# Patient Record
Sex: FEMALE | Race: WHITE | NOT HISPANIC OR LATINO | Employment: OTHER | URBAN - METROPOLITAN AREA
[De-identification: names, ages, dates, MRNs, and addresses within clinical notes are randomized per-mention and may not be internally consistent; named-entity substitution may affect disease eponyms.]

---

## 2018-12-18 ENCOUNTER — OFFICE VISIT (OUTPATIENT)
Dept: INTERNAL MEDICINE | Facility: CLINIC | Age: 58
End: 2018-12-18
Payer: MEDICARE

## 2018-12-18 VITALS
OXYGEN SATURATION: 76 % | WEIGHT: 251.13 LBS | SYSTOLIC BLOOD PRESSURE: 102 MMHG | DIASTOLIC BLOOD PRESSURE: 64 MMHG | TEMPERATURE: 98 F | BODY MASS INDEX: 41.84 KG/M2 | HEART RATE: 123 BPM | HEIGHT: 65 IN

## 2018-12-18 DIAGNOSIS — R73.9 HYPERGLYCEMIA: ICD-10-CM

## 2018-12-18 DIAGNOSIS — Z76.89 ENCOUNTER TO ESTABLISH CARE WITH NEW DOCTOR: Primary | ICD-10-CM

## 2018-12-18 DIAGNOSIS — Z86.69 HX OF MIGRAINE HEADACHES: ICD-10-CM

## 2018-12-18 DIAGNOSIS — L30.9 ECZEMA, UNSPECIFIED TYPE: ICD-10-CM

## 2018-12-18 DIAGNOSIS — R09.02 HYPOXEMIA: ICD-10-CM

## 2018-12-18 DIAGNOSIS — R06.02 SHORTNESS OF BREATH: ICD-10-CM

## 2018-12-18 DIAGNOSIS — I10 HYPERTENSION, WELL CONTROLLED: ICD-10-CM

## 2018-12-18 DIAGNOSIS — E78.5 HYPERLIPIDEMIA, UNSPECIFIED HYPERLIPIDEMIA TYPE: ICD-10-CM

## 2018-12-18 PROCEDURE — 90686 IIV4 VACC NO PRSV 0.5 ML IM: CPT | Mod: PBBFAC,PO

## 2018-12-18 PROCEDURE — 99205 OFFICE O/P NEW HI 60 MIN: CPT | Mod: PBBFAC,PO | Performed by: FAMILY MEDICINE

## 2018-12-18 PROCEDURE — 99999 PR PBB SHADOW E&M-NEW PATIENT-LVL V: CPT | Mod: PBBFAC,,, | Performed by: FAMILY MEDICINE

## 2018-12-18 PROCEDURE — 99205 OFFICE O/P NEW HI 60 MIN: CPT | Mod: S$PBB,,, | Performed by: FAMILY MEDICINE

## 2018-12-18 RX ORDER — TOPIRAMATE 100 MG/1
100 TABLET, FILM COATED ORAL DAILY
Qty: 30 TABLET | Refills: 5 | Status: SHIPPED | OUTPATIENT
Start: 2018-12-18 | End: 2019-08-13 | Stop reason: SDUPTHER

## 2018-12-18 RX ORDER — NARATRIPTAN 2.5 MG/1
TABLET ORAL
COMMUNITY
Start: 2018-11-21 | End: 2018-12-18 | Stop reason: SDUPTHER

## 2018-12-18 RX ORDER — SUMATRIPTAN 20 MG/1
1 SPRAY NASAL ONCE
Qty: 10 EACH | Refills: 5 | Status: SHIPPED | OUTPATIENT
Start: 2018-12-18 | End: 2019-12-17 | Stop reason: SDUPTHER

## 2018-12-18 RX ORDER — PROMETHAZINE HYDROCHLORIDE 25 MG/1
25 TABLET ORAL EVERY 4 HOURS PRN
Qty: 45 TABLET | Refills: 5 | Status: SHIPPED | OUTPATIENT
Start: 2018-12-18 | End: 2019-08-08 | Stop reason: SDUPTHER

## 2018-12-18 RX ORDER — SUMATRIPTAN SUCCINATE 100 MG/1
100 TABLET ORAL ONCE
Qty: 9 TABLET | Refills: 5 | Status: SHIPPED | OUTPATIENT
Start: 2018-12-18 | End: 2019-08-08 | Stop reason: SDUPTHER

## 2018-12-18 RX ORDER — TRIAZOLAM 0.25 MG/1
TABLET ORAL
COMMUNITY
Start: 2018-12-03 | End: 2018-12-18

## 2018-12-18 RX ORDER — SUMATRIPTAN 20 MG/1
SPRAY NASAL
COMMUNITY
Start: 2018-11-15 | End: 2018-12-18 | Stop reason: SDUPTHER

## 2018-12-18 RX ORDER — FLUTICASONE PROPIONATE 50 MCG
2 SPRAY, SUSPENSION (ML) NASAL DAILY
Qty: 1 BOTTLE | Refills: 5 | Status: SHIPPED | OUTPATIENT
Start: 2018-12-18 | End: 2021-03-30 | Stop reason: SDUPTHER

## 2018-12-18 RX ORDER — PROMETHAZINE HYDROCHLORIDE 25 MG/1
TABLET ORAL
COMMUNITY
Start: 2018-12-08 | End: 2018-12-18 | Stop reason: SDUPTHER

## 2018-12-18 RX ORDER — TIZANIDINE 4 MG/1
4 TABLET ORAL DAILY
Qty: 30 TABLET | Refills: 5 | Status: SHIPPED | OUTPATIENT
Start: 2018-12-18 | End: 2019-08-08 | Stop reason: SDUPTHER

## 2018-12-18 RX ORDER — TOPIRAMATE 100 MG/1
TABLET, FILM COATED ORAL
COMMUNITY
Start: 2018-11-11 | End: 2018-12-18 | Stop reason: SDUPTHER

## 2018-12-18 RX ORDER — TRIAMCINOLONE ACETONIDE 1 MG/G
CREAM TOPICAL
COMMUNITY
Start: 2018-11-13 | End: 2018-12-18 | Stop reason: SDUPTHER

## 2018-12-18 RX ORDER — FLUTICASONE PROPIONATE 50 MCG
SPRAY, SUSPENSION (ML) NASAL
COMMUNITY
Start: 2018-11-11 | End: 2018-12-18 | Stop reason: SDUPTHER

## 2018-12-18 RX ORDER — NARATRIPTAN 2.5 MG/1
TABLET ORAL
Qty: 9 TABLET | Refills: 5 | Status: SHIPPED | OUTPATIENT
Start: 2018-12-18 | End: 2019-10-15 | Stop reason: SDUPTHER

## 2018-12-18 RX ORDER — TRIAMCINOLONE ACETONIDE 1 MG/G
CREAM TOPICAL 2 TIMES DAILY
Qty: 45 G | Refills: 5 | Status: SHIPPED | OUTPATIENT
Start: 2018-12-18 | End: 2021-03-30 | Stop reason: SDUPTHER

## 2018-12-18 RX ORDER — CLONIDINE HYDROCHLORIDE 0.1 MG/1
0.1 TABLET ORAL ONCE
Qty: 30 TABLET | Refills: 5 | Status: SHIPPED | OUTPATIENT
Start: 2018-12-18 | End: 2019-08-08 | Stop reason: SDUPTHER

## 2018-12-18 RX ORDER — VERAPAMIL HYDROCHLORIDE 180 MG/1
TABLET, FILM COATED, EXTENDED RELEASE ORAL
COMMUNITY
Start: 2018-11-11 | End: 2018-12-18 | Stop reason: SDUPTHER

## 2018-12-18 RX ORDER — CLONIDINE HYDROCHLORIDE 0.1 MG/1
TABLET ORAL
COMMUNITY
Start: 2018-11-11 | End: 2018-12-18 | Stop reason: SDUPTHER

## 2018-12-18 RX ORDER — SUMATRIPTAN SUCCINATE 100 MG/1
TABLET ORAL
COMMUNITY
Start: 2018-11-13 | End: 2018-12-18 | Stop reason: SDUPTHER

## 2018-12-18 RX ORDER — VERAPAMIL HYDROCHLORIDE 180 MG/1
180 TABLET, FILM COATED, EXTENDED RELEASE ORAL DAILY
Qty: 30 TABLET | Refills: 5 | Status: SHIPPED | OUTPATIENT
Start: 2018-12-18 | End: 2019-06-07 | Stop reason: SDUPTHER

## 2018-12-18 RX ORDER — TIZANIDINE 4 MG/1
TABLET ORAL
COMMUNITY
Start: 2018-10-30 | End: 2018-12-18 | Stop reason: SDUPTHER

## 2018-12-18 NOTE — PROGRESS NOTES
Subjective:       Patient ID: Serene Rod is a 58 y.o. female.    Chief Complaint: Establish Care    New patient establish care.  Medical history includes daily migraine headaches, pulmonary ice sensitivity with hypoxemia.  She had a lung biopsy and was placed on prednisone for 6 months.  She currently uses on nasal O2 with a concentrator with walking and at bedtime.  Her O2 sats have been low as 70% she has had Prevnar 13 immunization August of this year she is not up-to-date on gyn exam mammogram.  Lab done earlier this year with elevated lipids and elevated glucose.  She has a contact dermatitis related to a new bed which is being replaced.  She also has eczema using triamcinolone cream.      Review of Systems   Constitutional: Negative for activity change, appetite change, fatigue and unexpected weight change.   HENT: Positive for congestion and sneezing. Negative for dental problem, ear pain, hearing loss, sore throat, tinnitus and trouble swallowing.         Improved with Flonase   Eyes: Negative for pain, redness, itching and visual disturbance.   Respiratory: Positive for cough, shortness of breath and wheezing. Negative for chest tightness.    Cardiovascular: Negative for chest pain, palpitations and leg swelling.   Gastrointestinal: Negative for abdominal distention, abdominal pain, blood in stool, constipation, diarrhea, nausea and vomiting.   Genitourinary: Negative for difficulty urinating, dysuria, frequency, hematuria and urgency.   Musculoskeletal: Negative for arthralgias, back pain, joint swelling, neck pain and neck stiffness.   Skin: Positive for rash. Negative for wound.        Eczema  well as contact dermatitis   Neurological: Positive for headaches. Negative for dizziness, tremors, syncope, weakness, light-headedness and numbness.   Hematological: Negative for adenopathy. Does not bruise/bleed easily.   Psychiatric/Behavioral: Negative for agitation, dysphoric mood and sleep disturbance. The  patient is not nervous/anxious.        Objective:      Physical Exam   Constitutional: She is oriented to person, place, and time. She appears well-developed and well-nourished.   HENT:   Right Ear: External ear normal.   Left Ear: External ear normal.   Nose: Nose normal.   Mouth/Throat: Oropharynx is clear and moist.   Eyes: Conjunctivae and EOM are normal. Pupils are equal, round, and reactive to light.   Neck: Normal range of motion. Neck supple. No thyromegaly present.   Cardiovascular: Normal rate, regular rhythm and normal heart sounds.   No murmur heard.  Pulmonary/Chest: She has no wheezes. She has rales.   She has mild dry basilar rales right side greater than left side.  With walking O2 sat decreased from 93% to 74%.  She was short of breath at this time as expected   Abdominal: Soft. Bowel sounds are normal. She exhibits no distension and no mass. There is no tenderness.   Musculoskeletal: She exhibits no edema or tenderness.   Lymphadenopathy:     She has no cervical adenopathy.   Neurological: She is alert and oriented to person, place, and time. She has normal reflexes. She displays normal reflexes. No cranial nerve deficit. She exhibits normal muscle tone. Coordination normal.   Skin: Skin is warm and dry. No rash noted. She is not diaphoretic.   Psychiatric: She has a normal mood and affect. Her behavior is normal. Judgment and thought content normal.       No results found for any previous visit.     Assessment:       1. Encounter to establish care with new doctor    2. Hx of migraine headaches    3. Shortness of breath    4. Hypertension, well controlled    5. Eczema, unspecified type    6. Hypoxemia    7. Hyperlipidemia, unspecified hyperlipidemia type    8. Hyperglycemia        Plan:   All of her medications were refilled.  Referral for pulmonary Neurology in Gynecology.  Lab was ordered to be done fasting.  Need records from previous physicians.  She recently moved from [place].  Follow-up in  3 months.  She needs a new oxygen concentrator and this was ordered      Encounter to establish care with new doctor  -     Ambulatory referral to Gynecology    Hx of migraine headaches  -     Ambulatory referral to Neurology    Shortness of breath    Hypertension, well controlled  -     Comprehensive metabolic panel; Future; Expected date: 12/18/2018  -     Urinalysis; Future; Expected date: 12/18/2018  -     CBC auto differential; Future; Expected date: 12/18/2018  -     TSH; Future; Expected date: 12/18/2018    Eczema, unspecified type    Hypoxemia  -     OXYGEN FOR HOME USE  -     Ambulatory referral to Pulmonology    Hyperlipidemia, unspecified hyperlipidemia type  -     Lipid panel; Future; Expected date: 12/18/2018    Hyperglycemia  -     Hemoglobin A1c    Other orders  -     Influenza - Quadrivalent (3 years & older) (PF)  -     verapamil (CALAN-SR) 180 MG CR tablet; Take 1 tablet (180 mg total) by mouth once daily.  Dispense: 30 tablet; Refill: 5  -     triamcinolone acetonide 0.1% (KENALOG) 0.1 % cream; Apply topically 2 (two) times daily.  Dispense: 45 g; Refill: 5  -     topiramate (TOPAMAX) 100 MG tablet; Take 1 tablet (100 mg total) by mouth once daily.  Dispense: 30 tablet; Refill: 5  -     tiZANidine (ZANAFLEX) 4 MG tablet; Take 1 tablet (4 mg total) by mouth once daily.  Dispense: 30 tablet; Refill: 5  -     SUMAtriptan (IMITREX) 20 mg/actuation nasal spray; 1 spray (20 mg total) by Nasal route once. for 1 dose  Dispense: 10 each; Refill: 5  -     sumatriptan (IMITREX) 100 MG tablet; Take 1 tablet (100 mg total) by mouth once. for 1 dose  Dispense: 9 tablet; Refill: 5  -     promethazine (PHENERGAN) 25 MG tablet; Take 1 tablet (25 mg total) by mouth every 4 (four) hours as needed for Nausea.  Dispense: 45 tablet; Refill: 5  -     naratriptan (AMERGE) 2.5 MG tablet; Qd prn  Dispense: 9 tablet; Refill: 5  -     fluticasone (FLONASE) 50 mcg/actuation nasal spray; 2 sprays (100 mcg total) by Each Nare  route once daily.  Dispense: 1 Bottle; Refill: 5  -     cloNIDine (CATAPRES) 0.1 MG tablet; Take 1 tablet (0.1 mg total) by mouth once. for 1 dose  Dispense: 30 tablet; Refill: 5

## 2018-12-19 ENCOUNTER — TELEPHONE (OUTPATIENT)
Dept: INTERNAL MEDICINE | Facility: CLINIC | Age: 58
End: 2018-12-19

## 2018-12-19 NOTE — TELEPHONE ENCOUNTER
----- Message from Autumn Morales sent at 12/19/2018  3:38 PM CST -----  Contact: Patient  Patient called to speak with the nurse; she had a question regarding her visit. She can be contacted at [phone].    Thanks,  Autumn

## 2018-12-20 ENCOUNTER — TELEPHONE (OUTPATIENT)
Dept: PULMONOLOGY | Facility: CLINIC | Age: 58
End: 2018-12-20

## 2018-12-22 ENCOUNTER — LAB VISIT (OUTPATIENT)
Dept: LAB | Facility: HOSPITAL | Age: 58
End: 2018-12-22
Attending: INTERNAL MEDICINE
Payer: MEDICARE

## 2018-12-22 ENCOUNTER — OFFICE VISIT (OUTPATIENT)
Dept: PULMONOLOGY | Facility: CLINIC | Age: 58
End: 2018-12-22
Payer: MEDICARE

## 2018-12-22 VITALS
OXYGEN SATURATION: 93 % | HEART RATE: 76 BPM | HEIGHT: 65 IN | WEIGHT: 252.88 LBS | SYSTOLIC BLOOD PRESSURE: 130 MMHG | BODY MASS INDEX: 42.13 KG/M2 | DIASTOLIC BLOOD PRESSURE: 82 MMHG

## 2018-12-22 DIAGNOSIS — E66.01 CLASS 3 SEVERE OBESITY DUE TO EXCESS CALORIES WITHOUT SERIOUS COMORBIDITY WITH BODY MASS INDEX (BMI) OF 40.0 TO 44.9 IN ADULT: Chronic | ICD-10-CM

## 2018-12-22 DIAGNOSIS — J67.9 HYPERSENSITIVITY PNEUMONITIS: Primary | ICD-10-CM

## 2018-12-22 DIAGNOSIS — J67.9 HYPERSENSITIVITY PNEUMONITIS: ICD-10-CM

## 2018-12-22 DIAGNOSIS — J96.11 CHRONIC RESPIRATORY FAILURE WITH HYPOXIA: ICD-10-CM

## 2018-12-22 DIAGNOSIS — R06.02 SHORTNESS OF BREATH: ICD-10-CM

## 2018-12-22 DIAGNOSIS — R09.02 HYPOXEMIA: ICD-10-CM

## 2018-12-22 LAB
ALBUMIN SERPL BCP-MCNC: 3.6 G/DL
ALP SERPL-CCNC: 152 U/L
ALT SERPL W/O P-5'-P-CCNC: 22 U/L
ANION GAP SERPL CALC-SCNC: 10 MMOL/L
AST SERPL-CCNC: 29 U/L
BILIRUB SERPL-MCNC: 0.4 MG/DL
BUN SERPL-MCNC: 18 MG/DL
CALCIUM SERPL-MCNC: 9.8 MG/DL
CHLORIDE SERPL-SCNC: 102 MMOL/L
CO2 SERPL-SCNC: 25 MMOL/L
CREAT SERPL-MCNC: 1.2 MG/DL
CRP SERPL-MCNC: 5.9 MG/L
ERYTHROCYTE [SEDIMENTATION RATE] IN BLOOD BY WESTERGREN METHOD: 13 MM/HR
EST. GFR  (AFRICAN AMERICAN): 57.6 ML/MIN/1.73 M^2
EST. GFR  (NON AFRICAN AMERICAN): 49.9 ML/MIN/1.73 M^2
GLUCOSE SERPL-MCNC: 103 MG/DL
POTASSIUM SERPL-SCNC: 4.2 MMOL/L
PROT SERPL-MCNC: 7.7 G/DL
SODIUM SERPL-SCNC: 137 MMOL/L

## 2018-12-22 PROCEDURE — 99205 OFFICE O/P NEW HI 60 MIN: CPT | Mod: S$PBB,,, | Performed by: INTERNAL MEDICINE

## 2018-12-22 PROCEDURE — 99999 PR PBB SHADOW E&M-EST. PATIENT-LVL III: CPT | Mod: PBBFAC,,, | Performed by: INTERNAL MEDICINE

## 2018-12-22 PROCEDURE — 36415 COLL VENOUS BLD VENIPUNCTURE: CPT | Mod: PO

## 2018-12-22 PROCEDURE — 86255 FLUORESCENT ANTIBODY SCREEN: CPT | Mod: 91

## 2018-12-22 PROCEDURE — 99213 OFFICE O/P EST LOW 20 MIN: CPT | Mod: PBBFAC,PO | Performed by: INTERNAL MEDICINE

## 2018-12-22 PROCEDURE — 80053 COMPREHEN METABOLIC PANEL: CPT

## 2018-12-22 PROCEDURE — 86140 C-REACTIVE PROTEIN: CPT

## 2018-12-22 PROCEDURE — 85651 RBC SED RATE NONAUTOMATED: CPT | Mod: PO

## 2018-12-22 PROCEDURE — 86038 ANTINUCLEAR ANTIBODIES: CPT

## 2018-12-22 NOTE — PATIENT INSTRUCTIONS
Chronic Lung Disease: If Oxygen Is Prescribed   Supplemental oxygen is prescribed if tests show that the level of oxygen in your blood is too low. If the level stays too low for too long, serious problems can develop in many parts of the body. Supplemental oxygen helps to relieve your symptoms and prevent future problems by getting more oxygen to the blood. Depending on your test results, you may need oxygen all the time. Or it may only be needed during certain activities, such as exercise or sleep. When oxygen is prescribed, youll be referred to a medical equipment company. They will set up the oxygen unit and teach you how to use it.  Nasal cannula     A nasal cannula should be placed in the nose with the prongs arching toward you.     Oxygen is most often inhaled through a nasal cannula (lightweight tube with two hollow prongs that fit just inside the nose).  Types of supplemental oxygen    Compressed oxygen   Oxygen concentrator   Liquid oxygen   Prescribed oxygen comes in several forms. You may use more than one type, depending on when you need oxygen:  · Compressed oxygen is oxygen gas stored in a tank. Because the oxygen is stored under pressure, these tanks must be handled carefully. Gauges on the tank can be used to adjust the oxygen flow rate. Your healthcare provider will determine what this should be. Small tanks can be carried. Larger tanks are on wheels and can be pulled around the house.  · An oxygen concentrator is a machine about the size of a large suitcase. It plugs into an electrical outlet. (A back-up oxygen supply is recommended in case of a power outage.) The machine takes oxygen from the air and concentrates it. Its then delivered to you through plastic tubing. The tubing is long enough so that you can move around the house. When youre using the concentrator, it must be kept somewhere that has a good supply of fresh air. (Dont keep it in a confined space, like a closet.) You may be set  up on a concentrator if you need oxygen all the time or while youre sleeping.  · Liquid oxygen results when oxygen gas is cooled to a very low temperature. Its kept in special containers that maintain this low temperature. When you use liquid oxygen, its warmed and becomes gas before reaching the cannula. Most tanks come with a portable unit that you can carry or pull on a cart. Some of these weigh only a few pounds. Liquid oxygen units are easy to carry around. If you need oxygen all the time or during activity, this kind of unit can help you stay active.  Oxygen is prescribed just for you  Your healthcare provider will prescribe oxygen based on your needs. Here are a few things you should know:  · A therapist from the medical equipment company will explain when to use oxygen and what type to use. Youll be taught how to use and maintain your oxygen equipment.  · You must use the exact rate of oxygen prescribed for each activity. Dont increase or decrease the amount without asking your healthcare provider first.  · Supplemental oxygen is a medicine. Its not addictive and causes no side effects when used as directed.   Date Last Reviewed: 5/1/2016  © 3979-2973 [employer]. [address] [place] [address], [place] [address]. All rights reserved. This information is not intended as a substitute for professional medical care. Always follow your healthcare professional's instructions.

## 2018-12-22 NOTE — ASSESSMENT & PLAN NOTE
Exercise oximetry:  1) Patient's O2 Sat on room air while at rest: 72%  If at rest Sat is 89% or above, continue.     2) Patient's O2 Sat on room air while exercisin% on 4L /min  If exercise Sat is 88% or below, continue.     Start oxygen supplementation at 4 L /min continous.   Overnight oximetry on room air.

## 2018-12-22 NOTE — LETTER
December 22, 2018      Jalen Box MD  [address] [place] [address] [address] [place] [address]           [facility]  [address] [facility] [place] [address]  Phone: [phone]  Fax: [phone]          Patient: Serene Rod   MR Number: [id number]   YOB: 1960   Date of Visit: 12/22/2018       Dear Dr. Jalen Box:    Thank you for referring Serene Rod to me for evaluation. Attached you will find relevant portions of my assessment and plan of care.    If you have questions, please do not hesitate to call me. I look forward to following Serene Rod along with you.    Sincerely,    Len Pradhan MD    Enclosure  CC:  No Recipients    If you would like to receive this communication electronically, please contact [email or web address] or [phone] to request more information on [employer] access.    For providers and/or their staff who would like to refer a patient to [facility], please contact us through our one-stop-shop provider referral line, [facility], at [phone].    If you feel you have received this communication in error or would no longer like to receive these types of communications, please e-mail [email or web address]

## 2018-12-22 NOTE — PROGRESS NOTES
New patient    Subjective:      Patient ID: Serene Rod is a 58 y.o. female.    Patient Active Problem List   Diagnosis    Hyperglycemia    Hypoxemia    Eczema    Hypertension, well controlled    Hx of migraine headaches    Encounter to establish care with new doctor    Shortness of breath    Hyperlipidemia    Chronic Hypersensitivity Pneumonitis    Class 3 severe obesity due to excess calories without serious comorbidity with body mass index (BMI) of 40.0 to 44.9 in adult    Chronic respiratory failure with hypoxia       Problem list has been reviewed.    she has been referred by Jalen Box MD for evaluation and management for   Chief Complaint   Patient presents with    Shortness of Breath       Chief Complaint: Shortness of Breath      HPI:    Diagnosed with [facility] in 10/2017 by [facility] ( [facility] - Dr. Church). Treated with Oral prednisone for 4 months. Oxygen supplementation at 4 L /min.  Relocated to  in 10 2018. Here to establish care.     Patient reports cough and difficulty breathing and denies hemoptysis. Patient denies recent sick contacts.   Patient does not have new pets. Patient does not have a personal history of asthma but has a brother who has asthma. Patient does have a history of environmental allergens. Allergy testing revealed allergy to a variety of trees, grasses, mold, dust mites, cats etc. Patient has not traveled recently. Patient does not have a history of smoking. Patient has had a previous chest x-ray. Patient has had a PPD done.  PPD was Negative      Previous Report Reviewed: none     Past Medical History: The following portions of the patient's history were reviewed and updated as appropriate:   She  has a past surgical history that includes neurostimulator and Lung biopsy.  Her family history includes Alcohol abuse in her brother; Emphysema in her father; Heart attack in her maternal grandfather; Heart disease in her brother, father, and mother; Lung  "cancer in her mother; Stroke in her father and mother.  She  reports that  has never smoked. she has never used smokeless tobacco. She reports that she drinks alcohol. Her drug history is not on file.  She has a current medication list which includes the following prescription(s): bupropion hcl, clonidine, escitalopram oxalate, fluticasone, naratriptan, promethazine, sumatriptan, sumatriptan, tizanidine, topiramate, triamcinolone acetonide 0.1%, and verapamil.  She is allergic to cat/feline products and grass pollen-june grass standard..    Review of Systems   Constitutional: Positive for night sweats. Negative for chills and fatigue.   HENT: Positive for postnasal drip, rhinorrhea, voice change and congestion.    Eyes: Negative for itching.   Respiratory: Positive for apnea, snoring, wheezing and dyspnea on extertion. Negative for hemoptysis and somnolence.    Cardiovascular: Positive for leg swelling. Negative for chest pain and palpitations.   Genitourinary: Negative for difficulty urinating and hematuria.   Endocrine: Negative for cold intolerance and heat intolerance.    Musculoskeletal: Positive for back pain.   Skin: Negative for rash.   Gastrointestinal: Positive for nausea and acid reflux. Negative for vomiting, abdominal pain and abdominal distention.   Neurological: Positive for dizziness and headaches.   Hematological: Excessive bruising.   Psychiatric/Behavioral: The patient is nervous/anxious.    All other systems reviewed and are negative.         Occupational History:  Retired on disability. Worked in customer service.   Denies occupational exposure to asbestos, silica and petrochemicals.    Avocational Exposures:  none    Pet Exposures:  Cat: Reports allergy to cat dander.       Objective:     Vitals:    12/22/18 1052   BP: 130/82   Pulse: 76   SpO2: (!) 93%   Weight: 114.7 kg (252 lb 13.9 oz)   Height: 5' 4.5" (1.638 m)   PainSc:   6   PainLoc: Head     Body mass index is 42.73 kg/m².    Physical " Exam   Constitutional: She is oriented to person, place, and time. She appears well-developed and well-nourished.   Morbidly obese   HENT:   Head: Normocephalic and atraumatic.   Mallampati 3    Eyes: EOM are normal. Pupils are equal, round, and reactive to light.   Neck: Normal range of motion. Neck supple.   14.5   Cardiovascular: Normal rate and regular rhythm.   Pulmonary/Chest: Effort normal and breath sounds normal.   Abdominal: Soft. Bowel sounds are normal.   Musculoskeletal: Normal range of motion.   Neurological: She is alert and oriented to person, place, and time.   Skin: Skin is warm and dry. Capillary refill takes less than 2 seconds.   Psychiatric: She has a normal mood and affect.   Nursing note and vitals reviewed.      Personal Diagnostic Review    Home Oxygen Evaluation     1) Patient's O2 Sat on room air while at rest: 72%  If at rest Sat is 89% or above, continue.     2) Patient's O2 Sat on room air while exercisin% on 4L /min  If exercise Sat is 88% or below, continue.       Assessment /Plan:     Discussed diagnosis, its evaluation, treatment and usual course. All questions answered.    Problem List Items Addressed This Visit        Pulmonary    Hypoxemia    Chronic Hypersensitivity Pneumonitis - Primary    Current Assessment & Plan     EVALUATION:  [x]        Complete PFT with bronchodilator.  []        Bronchial challenge with methacholine.   [x]        Stress test, pulmonary.  [x]        PULM - Arterial Blood Gases  []        Chest X Ray  [x]        CT scan of chest.   [x]        KIMBERLEY  [x]        Sedimentation rate  [x]        C-reactive protein  [x]        Anti-neutrophilic cytoplasmic antibody    PLAN:  Discussed diagnosis, its evaluation, treatment and usual course.  All questions answered.        Call if shortness of breath worsens, blood in sputum, change in character of cough, development of fever or chills, inability to maintain nutrition and hydration.     Re evaluate in four  weeks with results.         Relevant Orders    KIMBERLEY    Anti-neutrophilic cytoplasmic antibody    C-reactive protein    Sedimentation rate    Complete PFT with bronchodilator    PULM - Arterial Blood Gases--in addition to PFT only    Pulmonary stress test    CT Chest With Contrast    CBC auto differential    Comprehensive metabolic panel    Chronic respiratory failure with hypoxia    Current Assessment & Plan       Exercise oximetry:  1) Patient's O2 Sat on room air while at rest: 72%  If at rest Sat is 89% or above, continue.     2) Patient's O2 Sat on room air while exercisin% on 4L /min  If exercise Sat is 88% or below, continue.     Start oxygen supplementation at 4 L /min continous.   Overnight oximetry on room air.          Relevant Orders    OXYGEN FOR HOME USE    Pulse oximetry overnight       Other    Shortness of breath    Class 3 severe obesity due to excess calories without serious comorbidity with body mass index (BMI) of 40.0 to 44.9 in adult    Current Assessment & Plan     Complications associated with obesity reviewed. These include but are not limited to HTN, CAD,CHF, Respiratory disease, Insulin resistance syndrome, hyperlipidemia, atrial fibrillation, cancer  (endometrial, breast, kidney, colorectal, esophageal, renal, pancreatic and gall bladder cancer),GERD and NAFLD. Weight loss approaches reviewd with patient. Combination modality that includes eating behaviour changes, moderate excercise, adjuncvtive pharmacological therapy reviewed.  Patient expressed and verbalized understanding.                   TIME SPENT WITH PATIENT: Time spent: 60 minutes in face to face  discussion concerning diagnosis, prognosis, review of lab and test results, benefits of treatment as well as management of disease, counseling of patient and coordination of care between various health  care providers . Greater than half the time spent was used for coordination of care and counseling of patient.     Follow-up in  about 1 month (around 1/22/2019) for hypersensitivity pneumonitis, Chronic Respiratory Failure.

## 2018-12-22 NOTE — ASSESSMENT & PLAN NOTE
EVALUATION:  [x]        Complete PFT with bronchodilator.  []        Bronchial challenge with methacholine.   [x]        Stress test, pulmonary.  [x]        PULM - Arterial Blood Gases  []        Chest X Ray  [x]        CT scan of chest.   [x]        KIMBERLEY  [x]        Sedimentation rate  [x]        C-reactive protein  [x]        Anti-neutrophilic cytoplasmic antibody    PLAN:  Discussed diagnosis, its evaluation, treatment and usual course.  All questions answered.        Call if shortness of breath worsens, blood in sputum, change in character of cough, development of fever or chills, inability to maintain nutrition and hydration.     Re evaluate in four weeks with results.

## 2018-12-24 LAB — ANA SER QL IF: NORMAL

## 2018-12-26 LAB
ANCA AB TITR SER IF: NORMAL TITER
P-ANCA TITR SER IF: NORMAL TITER

## 2019-01-22 ENCOUNTER — TELEPHONE (OUTPATIENT)
Dept: PULMONOLOGY | Facility: CLINIC | Age: 59
End: 2019-01-22

## 2019-01-22 NOTE — TELEPHONE ENCOUNTER
----- Message from Marisabel Colvin sent at 1/22/2019 10:17 AM CST -----  Contact: Pt.   Pt is calling regarding requesting to have nurse call Pt. Pt states that she is needing to come to the office to  oxygen equipment  to  do an overnight study. .[phone] (home)           .Thank You  Ciarra Colvin

## 2019-01-25 ENCOUNTER — CLINICAL SUPPORT (OUTPATIENT)
Dept: PULMONOLOGY | Facility: CLINIC | Age: 59
End: 2019-01-25
Payer: MEDICARE

## 2019-01-25 DIAGNOSIS — J96.11 CHRONIC RESPIRATORY FAILURE WITH HYPOXIA: ICD-10-CM

## 2019-01-25 PROCEDURE — 94762 N-INVAS EAR/PLS OXIMTRY CONT: CPT | Mod: PBBFAC,PN

## 2019-01-29 NOTE — PROCEDURES
[facility]  Kriss [place] la  Test date: 19 Start: 19 02:58:43 Serene Rod  Doctor: Dr Pradhan End: 19 11:46:03 [id number]  Oximetry: Summary Report  Comments: ROOM AIR  Recording time: 08:47:20 Highest pulse: 118 Highest SpO2: 90%  Excluded samplin:10:00 Lowest pulse: 62 Lowest SpO2: 69%  Total valid samplin:37:20 Mean pulse: 83 Mean SpO2: 82.6%  1 S.D.: 4.9 1 S.D.: 2.2  Time with SpO2<90: 8:36:44, 99.9%  Time with SpO2<80: 0:30:20, 5.9%  Time with SpO2<70: 0:00:28, 0.1%  Time with SpO2<60: 0:00:00, 0.0%  Time with SpO2<88: 8:32:04, 99.0%  Time with SpO2 =>90: 0:00:36, 0.1%  Time with SpO2=>80 & <90: 8:06:24, 94.0%  Time with SpO2=>70 & <80: 0:29:52, 5.8%  Time with SpO2=>60 & <70: 0:00:28, 0.1%  The longest continuous time with saturation <=89 was 01:28:12, which started at  19 04:30:11.  A desaturation event was defined as a decrease of saturation by 4 or more.  One event was excluded due to artifact.  There were 24 desaturation events over 3 minutes duration.  There were 22 desaturation events of less than 3 minutes duration during which:  The mean high was 84.5%. The mean low was 78.1%.  The number of these events that were:  > 0 & <10 seconds: 1 > 0 seconds: 22  =>10 & <20 seconds: 1 =>10 seconds: 21  =>20 & <30 seconds: 0 =>20 seconds: 20  =>30 & <40 seconds: 2 =>30 seconds: 20  =>40 & <50 seconds: 1 =>40 seconds: 18  =>50 & <60 seconds: 1 =>50 seconds: 17  =>60 seconds: 16 =>60 seconds: 16  The mean length of desaturation events that were >=10 sec & <=3 mins was: 85.0 sec.  Desaturation event index (events >=10 sec per sampled hour): 2.4  Desaturation event index (events >= 0 sec per sampled hour): 2.6      PHYSICIAN INTERPRETATION:    OVERNIGHT OXIMETRY REPORT:    Dictated by: Len Hernández M.D.  Test date: 19  Dictated on: 2019      Comment: This test was performed on Room Air     A desaturation event was defined as a decrease of saturation by 4 or  more.    REPORT SUMMARY  Total recording time: 08:47:20  Excluded samplin:10:00  Total valid samplin:37:20  High pulse: 118 /min  Low pulse: 62 /min    Mean pulse: 83/min    High SpO2: 90%    Low SpO2: 69%    Mean SpO2  82.6 %  Cumulative time with oxygen saturation less than 88% (TC88): 08:32:04 ( 99%)      CLINICAL INTERPRETATION   There is  significant nocturnal oxygen desaturation and  Recommend overnight polysomnography if clinically indicated    Medicare Criteria Comments:   Oximetry test results suggest the patient falls under Medicare Group 1 Criteria. ( Arterial oxygen saturation at or below 88% for at least 5 minutes taken during sleep)    Details about Medicare Group Criteria coverage can be found at http://www.cms.hhs.gov/manuals/downloads/

## 2019-01-30 ENCOUNTER — TELEPHONE (OUTPATIENT)
Dept: PULMONOLOGY | Facility: CLINIC | Age: 59
End: 2019-01-30

## 2019-01-30 NOTE — TELEPHONE ENCOUNTER
----- Message from Len Pradhan MD sent at 2019  1:13 PM CST -----  Regarding: ONSAT results  OVERNIGHT OXIMETRY REPORT:    Dictated by: Len Hernández M.D.  Test date: 19  Dictated on: 2019      Comment: This test was performed on Room Air     A desaturation event was defined as a decrease of saturation by 4 or more.    REPORT SUMMARY  Total recording time: 08:47:20  Excluded samplin:10:00  Total valid samplin:37:20  High pulse: 118 /min  Low pulse: 62 /min    Mean pulse: 83/min    High SpO2: 90%    Low SpO2: 69%    Mean SpO2  82.6 %  Cumulative time with oxygen saturation less than 88% (TC88): 08:32:04 ( 99%)      CLINICAL INTERPRETATION   There is  significant nocturnal oxygen desaturation and  Recommend overnight polysomnography if clinically indicated    Medicare Criteria Comments:   Oximetry test results suggest the patient falls under Medicare Group 1 Criteria. ( Arterial oxygen saturation at or below 88% for at least 5 minutes taken during sleep)    Plan: Nocturnal oxygen supplementation at 4 L / min .    Repeat ONSAT on 4 L /min nocturnal oxygen supplementation.      Please inform patient.

## 2019-02-01 ENCOUNTER — CLINICAL SUPPORT (OUTPATIENT)
Dept: PULMONOLOGY | Facility: CLINIC | Age: 59
End: 2019-02-01
Payer: MEDICARE

## 2019-02-01 ENCOUNTER — OFFICE VISIT (OUTPATIENT)
Dept: PULMONOLOGY | Facility: CLINIC | Age: 59
End: 2019-02-01
Payer: MEDICARE

## 2019-02-01 ENCOUNTER — HOSPITAL ENCOUNTER (OUTPATIENT)
Dept: RADIOLOGY | Facility: HOSPITAL | Age: 59
Discharge: HOME | End: 2019-02-01
Attending: INTERNAL MEDICINE
Payer: MEDICARE

## 2019-02-01 VITALS
SYSTOLIC BLOOD PRESSURE: 130 MMHG | HEIGHT: 65 IN | RESPIRATION RATE: 18 BRPM | OXYGEN SATURATION: 97 % | WEIGHT: 247.13 LBS | DIASTOLIC BLOOD PRESSURE: 82 MMHG | BODY MASS INDEX: 41.18 KG/M2 | HEART RATE: 78 BPM

## 2019-02-01 DIAGNOSIS — J67.9 HYPERSENSITIVITY PNEUMONITIS: ICD-10-CM

## 2019-02-01 DIAGNOSIS — J67.9 HYPERSENSITIVITY PNEUMONITIS: Primary | Chronic | ICD-10-CM

## 2019-02-01 DIAGNOSIS — E87.20 METABOLIC ACIDEMIA: Chronic | ICD-10-CM

## 2019-02-01 DIAGNOSIS — J98.4 CRLD (CHRONIC RESTRICTIVE LUNG DISEASE): Chronic | ICD-10-CM

## 2019-02-01 DIAGNOSIS — E66.01 CLASS 3 SEVERE OBESITY DUE TO EXCESS CALORIES WITHOUT SERIOUS COMORBIDITY WITH BODY MASS INDEX (BMI) OF 40.0 TO 44.9 IN ADULT: Chronic | ICD-10-CM

## 2019-02-01 DIAGNOSIS — J98.4 PNEUMONITIS: Primary | ICD-10-CM

## 2019-02-01 DIAGNOSIS — J96.11 CHRONIC RESPIRATORY FAILURE WITH HYPOXIA: Chronic | ICD-10-CM

## 2019-02-01 LAB
ALLENS TEST: ABNORMAL
DELSYS: ABNORMAL
HCO3 UR-SCNC: 20 MMOL/L (ref 24–28)
PCO2 BLDA: 34.6 MMHG (ref 35–45)
PH SMN: 7.37 [PH] (ref 7.35–7.45)
PO2 BLDA: 50 MMHG (ref 80–100)
POC BE: -5 MMOL/L
POC SATURATED O2: 85 % (ref 95–100)
SAMPLE: ABNORMAL
SITE: ABNORMAL

## 2019-02-01 PROCEDURE — 99999 PR PBB SHADOW E&M-EST. PATIENT-LVL IV: ICD-10-PCS | Mod: PBBFAC,,, | Performed by: INTERNAL MEDICINE

## 2019-02-01 PROCEDURE — 94726 PLETHYSMOGRAPHY LUNG VOLUMES: CPT | Mod: 26,S$PBB,, | Performed by: INTERNAL MEDICINE

## 2019-02-01 PROCEDURE — 94729 PR C02/MEMBANE DIFFUSE CAPACITY: ICD-10-PCS | Mod: 26,S$PBB,, | Performed by: INTERNAL MEDICINE

## 2019-02-01 PROCEDURE — 94729 DIFFUSING CAPACITY: CPT | Mod: PBBFAC

## 2019-02-01 PROCEDURE — 99999 PR PBB SHADOW E&M-EST. PATIENT-LVL IV: CPT | Mod: PBBFAC,,, | Performed by: INTERNAL MEDICINE

## 2019-02-01 PROCEDURE — 94729 DIFFUSING CAPACITY: CPT | Mod: 26,S$PBB,, | Performed by: INTERNAL MEDICINE

## 2019-02-01 PROCEDURE — 36600 WITHDRAWAL OF ARTERIAL BLOOD: CPT | Mod: PBBFAC

## 2019-02-01 PROCEDURE — 25500020 PHARM REV CODE 255: Performed by: INTERNAL MEDICINE

## 2019-02-01 PROCEDURE — 36600 PR WITHDRAWAL OF ARTERIAL BLOOD: ICD-10-PCS | Mod: 59,S$PBB,, | Performed by: INTERNAL MEDICINE

## 2019-02-01 PROCEDURE — 99215 OFFICE O/P EST HI 40 MIN: CPT | Mod: 25,S$PBB,, | Performed by: INTERNAL MEDICINE

## 2019-02-01 PROCEDURE — 94726 PLETHYSMOGRAPHY LUNG VOLUMES: CPT | Mod: PBBFAC

## 2019-02-01 PROCEDURE — 82803 BLOOD GASES ANY COMBINATION: CPT | Mod: PBBFAC

## 2019-02-01 PROCEDURE — 94726 PULM FUNCT TST PLETHYSMOGRAP: ICD-10-PCS | Mod: 26,S$PBB,, | Performed by: INTERNAL MEDICINE

## 2019-02-01 PROCEDURE — 94060 EVALUATION OF WHEEZING: CPT | Mod: PBBFAC

## 2019-02-01 PROCEDURE — 94060 EVALUATION OF WHEEZING: CPT | Mod: 26,S$PBB,, | Performed by: INTERNAL MEDICINE

## 2019-02-01 PROCEDURE — 71260 CT THORAX DX C+: CPT | Mod: TC

## 2019-02-01 PROCEDURE — 99215 PR OFFICE/OUTPT VISIT, EST, LEVL V, 40-54 MIN: ICD-10-PCS | Mod: 25,S$PBB,, | Performed by: INTERNAL MEDICINE

## 2019-02-01 PROCEDURE — 94060 PR EVAL OF BRONCHOSPASM: ICD-10-PCS | Mod: 26,S$PBB,, | Performed by: INTERNAL MEDICINE

## 2019-02-01 PROCEDURE — 99214 OFFICE O/P EST MOD 30 MIN: CPT | Mod: PBBFAC,25 | Performed by: INTERNAL MEDICINE

## 2019-02-01 PROCEDURE — 36600 WITHDRAWAL OF ARTERIAL BLOOD: CPT | Mod: 59,S$PBB,, | Performed by: INTERNAL MEDICINE

## 2019-02-01 RX ADMIN — IOHEXOL 75 ML: 350 INJECTION, SOLUTION INTRAVENOUS at 02:02

## 2019-02-01 NOTE — PROCEDURES
PHYSICIAN INTERPRETATION:    ABG: pH: 7.370   PaO2: 50 PaCO2 34.6 HCO3 20.0  SaO2 85     Severe hypoxemia. A-a gradient (alveolar-arterial gradient; AaG) elevated: [ 57.8mmHg ]  Mixed respiratory alkalosis / metabolic acidosis, with normal anion gap     (expected Pco2 = 36 - 40)     expected pH = 7.38  expected CO2 = 36  expected HCO3- = 20

## 2019-02-01 NOTE — ASSESSMENT & PLAN NOTE
CHP ROS: taking medications as instructed, no medication side effects noted, no significant ongoing wheezing or shortness of breath, using bronchodilator MDI less than twice a week.   New concerns: None.   Exam: chest rales noted bilaterally.   Assessment:  CHP stable.   Plan: Will resume oral prednisone after testing completed. TPMT testing.

## 2019-02-01 NOTE — PATIENT INSTRUCTIONS
Lung Anatomy  Your lungs take air in to give your body oxygen, which the body needs to work. Your lungs, like all the tissues in your body, are made up of billions of tiny specialized cells. Old lung cells die and are replaced by new, identical lung cells. This natural process helps ensure healthy lungs.    Date Last Reviewed: 11/1/2016  © 8401-7230 [employer]. [address] [place] [address], [place] [address]. All rights reserved. This information is not intended as a substitute for professional medical care. Always follow your healthcare professional's instructions.

## 2019-02-01 NOTE — PROGRESS NOTES
Subjective:      Established patient    Patient ID: Serene Rod is a 58 y.o. female.  Patient Active Problem List   Diagnosis    Hyperglycemia    Hypoxemia    Eczema    Hypertension, well controlled    Hx of migraine headaches    Encounter to establish care with new doctor    Shortness of breath    Hyperlipidemia    Chronic Hypersensitivity Pneumonitis    Class 3 severe obesity due to excess calories without serious comorbidity with body mass index (BMI) of 40.0 to 44.9 in adult    Chronic respiratory failure with hypoxia    Metabolic acidemia    CRLD (chronic restrictive lung disease)       Problem list has been reviewed.    Chief Complaint: Shortness of Breath      HPI    ONSAT, PFT and ABG reviewed with patient who expressed and voiced understanding.   All questions were answered to the patients satisfaction.      Patients reports NYHA II dyspnea    The patient does not have currently have symptoms / an exacerbation.         No recent change in breathing.      A full  review of systems, past , family  and social histories was performed except as mentioned in the note above, these are non contributory to the main issues discussed today.       Previous Report Reviewed: lab reports, office notes and radiology reports     The following portions of the patient's history were reviewed and updated as appropriate: She  has a past medical history of Allergy and Migraines.  She  has a past surgical history that includes neurostimulator and Lung biopsy.  Her family history includes Alcohol abuse in her brother; Emphysema in her father; Heart attack in her maternal grandfather; Heart disease in her brother, father, and mother; Lung cancer in her mother; Stroke in her father and mother.  She  reports that  has never smoked. she has never used smokeless tobacco. She reports that she drinks alcohol. Her drug history is not on file.  She has a current medication list which includes the following prescription(s):  "bupropion hcl, escitalopram oxalate, fluticasone, naratriptan, promethazine, tizanidine, topiramate, triamcinolone acetonide 0.1%, verapamil, clonidine, sumatriptan, and sumatriptan.  She is allergic to cat/feline products and grass pollen-virgie grass standard..    Review of Systems   Constitutional: Positive for night sweats. Negative for chills and fatigue.   HENT: Positive for postnasal drip, rhinorrhea, voice change and congestion.    Eyes: Negative for itching.   Respiratory: Positive for apnea, snoring, wheezing and dyspnea on extertion. Negative for hemoptysis and somnolence.    Cardiovascular: Positive for leg swelling. Negative for chest pain and palpitations.   Genitourinary: Negative for difficulty urinating and hematuria.   Endocrine: Negative for cold intolerance and heat intolerance.    Musculoskeletal: Positive for back pain.   Skin: Negative for rash.   Gastrointestinal: Positive for nausea and acid reflux. Negative for vomiting, abdominal pain and abdominal distention.   Neurological: Positive for dizziness and headaches.   Hematological: Excessive bruising.   Psychiatric/Behavioral: The patient is nervous/anxious.    All other systems reviewed and are negative.       Objective:     /82   Pulse 78   Resp 18   Ht 5' 5" (1.651 m)   Wt 112.1 kg (247 lb 1.6 oz)   SpO2 97%   BMI 41.12 kg/m²   Body mass index is 41.12 kg/m².     Physical Exam   Constitutional: She is oriented to person, place, and time. She appears well-developed and well-nourished.   Morbidly obese   HENT:   Head: Normocephalic and atraumatic.   Mallampati 3    Eyes: EOM are normal. Pupils are equal, round, and reactive to light.   Neck: Normal range of motion. Neck supple.   14.5   Cardiovascular: Normal rate and regular rhythm.   Pulmonary/Chest: Effort normal and breath sounds normal.   Abdominal: Soft. Bowel sounds are normal.   Musculoskeletal: Normal range of motion.   Neurological: She is alert and oriented to person, " place, and time.   Skin: Skin is warm and dry. Capillary refill takes less than 2 seconds.   Psychiatric: She has a normal mood and affect.   Nursing note and vitals reviewed.      Personal Diagnostic Review    ABG: pH: 7.370   PaO2: 50 PaCO2 34.6 HCO3 20.0  SaO2 85    Severe hypoxemia. A-a gradient (alveolar-arterial gradient; AaG) elevated: [ 57.8mmHg ]  Mixed respiratory alkalosis / metabolic acidosis, with normal anion gap    (expected Pco2 = 36 - 40)    expected pH = 7.38  expected CO2 = 36  expected HCO3- = 20      Pulmonary function tests: FEV1: 1.39 (53.1 % predicted), FVC:  1.72 (51.8 % predicted), FEV1/FVC:  81, TLC: 3.10 (60.7 % predicted), RV/TLVC: 44 (114.9 % predicted), DLCO: 6.79 (28.7 % predicted)   Normal airflow. Severe restriction. Diffusion capacity is severely reduced.      PHYSICIAN INTERPRETATION:    OVERNIGHT OXIMETRY REPORT:    Dictated by: Len Hernández M.D.  Test date: 19  Dictated on: 2019      Comment: This test was performed on Room Air     A desaturation event was defined as a decrease of saturation by   4 or more.    REPORT SUMMARY  Total recording time: 08:47:20  Excluded samplin:10:00  Total valid samplin:37:20  High pulse: 118 /min  Low pulse: 62 /min    Mean pulse: 83/min    High SpO2: 90%    Low SpO2: 69%    Mean SpO2  82.6 %  Cumulative time with oxygen saturation less than 88% (TC88):   08:32:04 ( 99%)      CLINICAL INTERPRETATION   There is  significant nocturnal oxygen desaturation and    Recommend overnight polysomnography if clinically indicated    Assessment / Plan:       Discussed diagnosis, its evaluation, treatment and usual course. All questions answered.    Problem List Items Addressed This Visit        Pulmonary    CRLD (chronic restrictive lung disease) (Chronic)    Current Assessment & Plan     SECONDARY TO CHP.    Continue to monitor.          Chronic Hypersensitivity Pneumonitis - Primary    Current Assessment & Plan     CHP ROS: taking  medications as instructed, no medication side effects noted, no significant ongoing wheezing or shortness of breath, using bronchodilator MDI less than twice a week.   New concerns: None.   Exam: chest rales noted bilaterally.   Assessment:  CHP stable.   Plan: Will resume oral prednisone after testing completed. TPMT testing.          Relevant Orders    Thiopurine Methyltrans, RBC    Thiopurine Methyltrans (TPMT) Genotyping    Chronic respiratory failure with hypoxia    Current Assessment & Plan     Continue oxygen supplementation at 4 l /min with exertion and with sleep. 6MWT rescheduled.             Renal/    Metabolic acidemia (Chronic)    Current Assessment & Plan     Etiology unclear/. Likely drug related - TOPAMAX. Check serum lactate levels. Continue to observe.          Relevant Orders    LACTIC ACID, PLASMA       Other    Class 3 severe obesity due to excess calories without serious comorbidity with body mass index (BMI) of 40.0 to 44.9 in adult    Current Assessment & Plan     General weight loss/lifestyle modification strategies discussed (elicit support from others; identify saboteurs; non-food rewards, etc).  Diet interventions: low calorie (1000 kCal/d) deficit diet.  Informal exercise measures discussed, e.g. taking stairs instead of elevator.                 TIME SPENT WITH PATIENT: Time spent: 45 minutes in face to face  discussion concerning diagnosis, prognosis, review of lab and test results, benefits of treatment as well as management of disease, counseling of patient and coordination of care between various health  care providers . Greater than half the time spent was used for coordination of care and counseling of patient.       Follow-up in about 1 month (around 3/1/2019) for Morbid Obesity, Restrictive Lung Disease, CHP..

## 2019-02-01 NOTE — ASSESSMENT & PLAN NOTE
General weight loss/lifestyle modification strategies discussed (elicit support from others; identify saboteurs; non-food rewards, etc).  Diet interventions: low calorie (1000 kCal/d) deficit diet.  Informal exercise measures discussed, e.g. taking stairs instead of elevator.

## 2019-02-01 NOTE — ASSESSMENT & PLAN NOTE
Etiology unclear/. Likely drug related - TOPAMAX. Check serum lactate levels. Continue to observe.

## 2019-02-02 LAB
BRPFT: ABNORMAL
DLCO ADJ PRE: 6.79 ML/(MIN*MMHG) (ref 17.9–29.37)
DLCO SINGLE BREATH LLN: 17.9
DLCO SINGLE BREATH PRE REF: 28.7 %
DLCO SINGLE BREATH REF: 23.63
DLCOC SBVA LLN: 3.19
DLCOC SBVA PRE REF: 64 %
DLCOC SBVA REF: 4.63
DLCOC SINGLE BREATH LLN: 17.9
DLCOC SINGLE BREATH PRE REF: 28.7 %
DLCOC SINGLE BREATH REF: 23.63
DLCOVA LLN: 3.19
DLCOVA PRE REF: 64 %
DLCOVA PRE: 2.96 ML/(MIN*MMHG*L) (ref 3.19–6.07)
DLCOVA REF: 4.63
DLVAADJ PRE: 2.96 ML/(MIN*MMHG*L) (ref 3.19–6.07)
ERV LLN: 0.84
ERV PRE REF: 54.7 %
ERV REF: 0.84
ERVN2 LLN: 0.84
ERVN2 REF: 0.84
FEF 25 75 CHG: 9.8 %
FEF 25 75 LLN: 1.24
FEF 25 75 POST REF: 74.1 %
FEF 25 75 PRE REF: 67.5 %
FEF 25 75 REF: 2.39
FET100 CHG: -10.5 %
FEV1 CHG: 2.4 %
FEV1 FVC CHG: 0.8 %
FEV1 FVC LLN: 68
FEV1 FVC POST REF: 102.7 %
FEV1 FVC PRE REF: 101.8 %
FEV1 FVC REF: 79
FEV1 LLN: 1.99
FEV1 POST REF: 54.3 %
FEV1 PRE REF: 53.1 %
FEV1 REF: 2.62
FEV6 CHG: 0.4 %
FEV6 LLN: 2.66
FEV6 POST REF: 50.7 %
FEV6 POST: 1.71 L (ref 2.66–4.06)
FEV6 PRE REF: 50.5 %
FEV6 PRE: 1.7 L (ref 2.66–4.06)
FEV6 REF: 3.36
FRCN2 LLN: 1.93
FRCN2 REF: 2.75
FRCPLETH LLN: 1.93
FRCPLETH PREREF: 69.6 %
FRCPLETH REF: 2.75
FVC CHG: 1.5 %
FVC LLN: 2.53
FVC POST REF: 52.5 %
FVC PRE REF: 51.8 %
FVC REF: 3.32
IVC PRE: 1.49 L (ref 2.53–4.11)
IVC SINGLE BREATH LLN: 2.53
IVC SINGLE BREATH PRE REF: 45 %
IVC SINGLE BREATH REF: 3.32
MVV LLN: 84
MVV PRE REF: 45.6 %
MVV REF: 99
PEF CHG: -20.8 %
PEF LLN: 4.78
PEF POST REF: 56.2 %
PEF PRE REF: 70.9 %
PEF REF: 6.55
POST FEF 25 75: 1.77 L/S (ref 1.24–3.53)
POST FET 100: 6.87 SEC
POST FEV1 FVC: 81.5 % (ref 67.54–91.22)
POST FEV1: 1.42 L (ref 1.99–3.25)
POST FVC: 1.75 L (ref 2.53–4.11)
POST PEF: 3.68 L/S (ref 4.78–8.31)
PRE DLCO: 6.79 ML/(MIN*MMHG) (ref 17.9–29.37)
PRE ERV: 0.46 L (ref 0.84–0.84)
PRE FEF 25 75: 1.61 L/S (ref 1.24–3.53)
PRE FET 100: 7.68 SEC
PRE FEV1 FVC: 80.81 % (ref 67.54–91.22)
PRE FEV1: 1.39 L (ref 1.99–3.25)
PRE FRC PL: 1.92 L
PRE FVC: 1.72 L (ref 2.53–4.11)
PRE MVV: 45 L/MIN (ref 83.89–113.5)
PRE PEF: 4.64 L/S (ref 4.78–8.31)
PRE RV: 1.38 L (ref 1.34–2.49)
PRE TLC: 3.1 L (ref 4.11–6.09)
RAW LLN: 3.06
RAW PRE REF: 86.3 %
RAW PRE: 2.64 CMH2O*S/L (ref 3.06–3.06)
RAW REF: 3.06
RV LLN: 1.34
RV PRE REF: 71.9 %
RV REF: 1.91
RVN2 LLN: 1.34
RVN2 REF: 1.91
RVN2TLCN2 LLN: 29
RVN2TLCN2 REF: 39
RVTLC LLN: 29
RVTLC PRE REF: 114.9 %
RVTLC PRE: 44.44 % (ref 29.09–48.27)
RVTLC REF: 39
TLC LLN: 4.11
TLC PRE REF: 60.7 %
TLC REF: 5.1
TLCN2 LLN: 4.11
TLCN2 REF: 5.1
VA PRE: 2.27 L (ref 4.95–4.95)
VA SINGLE BREATH LLN: 4.95
VA SINGLE BREATH PRE REF: 45.9 %
VA SINGLE BREATH REF: 4.95
VC LLN: 2.53
VC PRE REF: 51.8 %
VC PRE: 1.72 L (ref 2.53–4.11)
VC REF: 3.32
VCMAXN2 LLN: 2.53
VCMAXN2 REF: 3.32
VTGRAWPRE: 2.12 L

## 2019-02-07 ENCOUNTER — LAB VISIT (OUTPATIENT)
Dept: LAB | Facility: HOSPITAL | Age: 59
End: 2019-02-07
Attending: INTERNAL MEDICINE
Payer: MEDICARE

## 2019-02-07 ENCOUNTER — CLINICAL SUPPORT (OUTPATIENT)
Dept: PULMONOLOGY | Facility: CLINIC | Age: 59
End: 2019-02-07
Payer: MEDICARE

## 2019-02-07 VITALS — BODY MASS INDEX: 40.77 KG/M2 | HEIGHT: 65 IN | WEIGHT: 244.69 LBS

## 2019-02-07 DIAGNOSIS — J67.9 HYPERSENSITIVITY PNEUMONITIS: Chronic | ICD-10-CM

## 2019-02-07 DIAGNOSIS — E87.20 METABOLIC ACIDEMIA: Chronic | ICD-10-CM

## 2019-02-07 LAB — LACTATE SERPL-SCNC: 1.7 MMOL/L

## 2019-02-07 PROCEDURE — 94618 PULMONARY STRESS TESTING: CPT | Mod: 26,S$PBB,, | Performed by: INTERNAL MEDICINE

## 2019-02-07 PROCEDURE — 99999 PR PBB SHADOW E&M-EST. PATIENT-LVL I: ICD-10-PCS | Mod: PBBFAC,,,

## 2019-02-07 PROCEDURE — 94618 PULMONARY STRESS TESTING: CPT | Mod: PBBFAC,PN

## 2019-02-07 PROCEDURE — 83605 ASSAY OF LACTIC ACID: CPT

## 2019-02-07 PROCEDURE — 36415 COLL VENOUS BLD VENIPUNCTURE: CPT

## 2019-02-07 PROCEDURE — 99211 OFF/OP EST MAY X REQ PHY/QHP: CPT | Mod: PBBFAC,PN

## 2019-02-07 PROCEDURE — 94618 PULMONARY STRESS TESTING: ICD-10-PCS | Mod: 26,S$PBB,, | Performed by: INTERNAL MEDICINE

## 2019-02-07 PROCEDURE — 99999 PR PBB SHADOW E&M-EST. PATIENT-LVL I: CPT | Mod: PBBFAC,,,

## 2019-02-07 NOTE — PROCEDURES
"[facility] - [facility] Function [facility]  Six Minute Walk     SUMMARY     Ordering Provider: MD Dr. Raj Pradhan immediately available as needed.  Performing nurse/tech/RT: [place]  Diagnosis: (chronic hypersensitivity pneumonitis)  Height: 5' 5" (165.1 cm)  Weight: 111 kg (244 lb 11.4 oz)  BMI (Calculated): 40.8   Patient Race:             Phase Oxygen Assessment Supplemental O2 Heart   Rate Blood Pressure Sophie Dyspnea Scale Rating   Resting 84 % Room Air 107 bpm (!) 142/97 4   Exercise        Minute        1 84 % 4 L/M(increased to 6lpm) 138 bpm     2 88 % 6 L/M 124 bpm     3 90 % 6 L/M 118 bpm     4 92 % 6 L/M 116 bpm     5 95 % 6 L/M 110 bpm     6  98 % 6 L/M 98 bpm   7-8   Recovery        Minute        1 98 % 6 L/M 97 bpm     2 98 % 6 L/M 98 bpm     3 98 % 6 L/M 98 bpm     4 89 % 6 L/M 138 bpm 139/83 7-8     Six Minute Walk Summary  6MWT Status: completed with stops  Patient Reported: Dyspnea     Interpretation:  Did the patient stop or pause?: Yes  How many times did the patient stop or pause?: 1  Stop Time 1: 60  Restart Time 1: 0      Total Time Walked (Calculated): 360 seconds  Final Partial Lap Distance (feet): 0 feet  Total Distance Meters (Calculated): 60.96 meters  Predicted Distance Meters (Calculated): 425.93 meters  Percentage of Predicted (Calculated): 14.31  Peak VO2 (Calculated): 5.81  Mets: 1.66  Has The Patient Had a Previous Six Minute Walk Test?: No       Previous 6MWT Results  Has The Patient Had a Previous Six Minute Walk Test?: No         PHYSICIAN INTERPRETATION:      Six minute walk distance is 60.96 / 425.93 meters  (14.31 % predicted) with very heavy dyspnea. Peak VO2 during walking was 5.81 Ml/min/kg [ 1.66 METS]. Oxygen saturation was 84% on room air. During exercise, there was significant desaturation while breathing 4 L/M  supplemental oxygen to 84%. Oxygen saturation improved to 95% with oxygen supplementation at 6 L /min.   Blood pressure remained stable and Heart rate " increased significantly with walking.  Hypertension was present prior to exercise.  This may represent a normal cardiovascular response to exercise.  The patient did not report non-pulmonary symptoms during exercise.  Severe exercise impairment is likely due to desaturation.  The patient did complete the study, walking 360 seconds of the 360 second test.  The patient may benefit from using supplemental oxygen during exertion.  No previous study performed.  Based upon age and body mass index, exercise capacity is less than predicted.

## 2019-02-11 LAB
NUDT15 GENOTYPE: NORMAL
NUDT15 PHENOTYPE: NORMAL
TPMT ADDITIONAL INFORMATION: NORMAL
TPMT DISCLAIMER: NORMAL
TPMT GENOTYPE RESULT: NORMAL
TPMT INTERPRETATION: NORMAL
TPMT METHOD: NORMAL
TPMT PHENOTYPE: NORMAL
TPMT REVIEWED BY: NORMAL

## 2019-02-12 LAB
6-METHYLMERCAPTOPURINE RIBOSIDE: 5.76 NMOL/ML/H (ref 5.04–9.57)
6-METHYLMERCAPTOPURINE: 4.19 NMOL/ML/H (ref 3–6.66)
6-METHYLTHIOGUANINE RIBOSIDE: 3.76 NMOL/ML/H (ref 2.7–5.84)
TPMT INTERPRETATION: NORMAL
TPMT REVIEWED BY: NORMAL

## 2019-02-19 ENCOUNTER — OFFICE VISIT (OUTPATIENT)
Dept: PULMONOLOGY | Facility: CLINIC | Age: 59
End: 2019-02-19
Payer: MEDICARE

## 2019-02-19 VITALS
DIASTOLIC BLOOD PRESSURE: 82 MMHG | RESPIRATION RATE: 20 BRPM | BODY MASS INDEX: 40.96 KG/M2 | OXYGEN SATURATION: 93 % | HEIGHT: 65 IN | SYSTOLIC BLOOD PRESSURE: 125 MMHG | HEART RATE: 75 BPM | WEIGHT: 245.81 LBS

## 2019-02-19 DIAGNOSIS — J96.11 CHRONIC RESPIRATORY FAILURE WITH HYPOXIA: Chronic | ICD-10-CM

## 2019-02-19 DIAGNOSIS — J67.9 HYPERSENSITIVITY PNEUMONITIS: Primary | Chronic | ICD-10-CM

## 2019-02-19 DIAGNOSIS — J98.4 CRLD (CHRONIC RESTRICTIVE LUNG DISEASE): Chronic | ICD-10-CM

## 2019-02-19 DIAGNOSIS — E66.01 CLASS 3 SEVERE OBESITY DUE TO EXCESS CALORIES WITHOUT SERIOUS COMORBIDITY WITH BODY MASS INDEX (BMI) OF 40.0 TO 44.9 IN ADULT: Chronic | ICD-10-CM

## 2019-02-19 PROBLEM — E87.20 METABOLIC ACIDEMIA: Chronic | Status: RESOLVED | Noted: 2019-02-01 | Resolved: 2019-02-19

## 2019-02-19 PROCEDURE — 99999 PR PBB SHADOW E&M-EST. PATIENT-LVL III: CPT | Mod: PBBFAC,,, | Performed by: INTERNAL MEDICINE

## 2019-02-19 PROCEDURE — 99999 PR PBB SHADOW E&M-EST. PATIENT-LVL III: ICD-10-PCS | Mod: PBBFAC,,, | Performed by: INTERNAL MEDICINE

## 2019-02-19 PROCEDURE — 99213 OFFICE O/P EST LOW 20 MIN: CPT | Mod: PBBFAC | Performed by: INTERNAL MEDICINE

## 2019-02-19 PROCEDURE — 99215 OFFICE O/P EST HI 40 MIN: CPT | Mod: 25,S$PBB,, | Performed by: INTERNAL MEDICINE

## 2019-02-19 PROCEDURE — 99215 PR OFFICE/OUTPT VISIT, EST, LEVL V, 40-54 MIN: ICD-10-PCS | Mod: 25,S$PBB,, | Performed by: INTERNAL MEDICINE

## 2019-02-19 RX ORDER — PREDNISONE 20 MG/1
40 TABLET ORAL DAILY
Qty: 60 TABLET | Refills: 1 | Status: SHIPPED | OUTPATIENT
Start: 2019-02-19 | End: 2019-05-09 | Stop reason: SDUPTHER

## 2019-02-19 NOTE — PATIENT INSTRUCTIONS
Side effects of steroid medications discussed in detail.  The side effects include the following: Changes in mood, hypoglycemia, cataracts, fluid retention, hypertension, diabetes as well as weakness of bones and weight gain.  Patient expressed and verbalized understanding.

## 2019-02-19 NOTE — ASSESSMENT & PLAN NOTE
Continue oxygen supplementation at 4 - 6  l /min with exertion and 4 l/ min with sleep. Repeat 6MWT in 1 month after treatment with prednsione.

## 2019-02-19 NOTE — ASSESSMENT & PLAN NOTE
CHP ROS: taking medications as instructed, no medication side effects noted, no significant ongoing wheezing or shortness of breath, using bronchodilator MDI less than twice a week.   New concerns: None.   Exam: chest rales noted bilaterally.   Assessment:  CHP stable.   Plan: PREDNISONE 40MG PO DAILY Re evaluate in 1 month.

## 2019-02-20 NOTE — PROGRESS NOTES
Subjective:      Established patient    Patient ID: Serene Rod is a 58 y.o. female.  Patient Active Problem List   Diagnosis    Hyperglycemia    Eczema    Hypertension, well controlled    Hx of migraine headaches    Encounter to establish care with new doctor    Shortness of breath    Hyperlipidemia    Chronic Hypersensitivity Pneumonitis    Class 3 severe obesity due to excess calories without serious comorbidity with body mass index (BMI) of 40.0 to 44.9 in adult    Chronic respiratory failure with hypoxia    CRLD (chronic restrictive lung disease)       Problem list has been reviewed.    Chief Complaint: Shortness of Breath      HPI      CHP:     6MWT reviewed with patient who expressed and voiced understanding.   All questions were answered to the patients satisfaction.    Simple pulmonary stress test needs to be repeated.     Patients reports progressive dyspnea NYHA III dyspnea    The patient does not have currently have symptoms / an exacerbation.      She is on continuous oxygen supplementation @ 4 L /min .     No recent change in breathing.     A full  review of systems, past , family  and social histories was performed except as mentioned in the note above, these are non contributory to the main issues discussed today.       Previous Report Reviewed: lab reports, office notes and radiology reports     The following portions of the patient's history were reviewed and updated as appropriate: She  has a past medical history of Allergy and Migraines.  She  has a past surgical history that includes neurostimulator and Lung biopsy.  Her family history includes Alcohol abuse in her brother; Emphysema in her father; Heart attack in her maternal grandfather; Heart disease in her brother, father, and mother; Lung cancer in her mother; Stroke in her father and mother.  She  reports that  has never smoked. she has never used smokeless tobacco. She reports that she drinks alcohol. Her drug history is not on  "file.  She has a current medication list which includes the following prescription(s): bupropion hcl, clonidine, escitalopram oxalate, fluticasone, naratriptan, prednisone, promethazine, sumatriptan, sumatriptan, tizanidine, topiramate, triamcinolone acetonide 0.1%, and verapamil.  She is allergic to cat/feline products and grass pollen-virgie grass standard..    Review of Systems   Constitutional: Positive for night sweats. Negative for chills and fatigue.   HENT: Positive for postnasal drip, rhinorrhea, voice change and congestion.    Eyes: Negative for itching.   Respiratory: Positive for apnea, snoring, shortness of breath, wheezing and dyspnea on extertion. Negative for hemoptysis and somnolence.    Cardiovascular: Positive for leg swelling. Negative for chest pain and palpitations.   Genitourinary: Negative for difficulty urinating and hematuria.   Endocrine: Negative for cold intolerance and heat intolerance.    Musculoskeletal: Positive for back pain.   Skin: Negative for rash.   Gastrointestinal: Positive for nausea and acid reflux. Negative for vomiting, abdominal pain and abdominal distention.   Neurological: Positive for dizziness and headaches.   Hematological: Excessive bruising.   Psychiatric/Behavioral: The patient is nervous/anxious.    All other systems reviewed and are negative.       Objective:     /82   Pulse 75   Resp 20   Ht 5' 5" (1.651 m)   Wt 111.5 kg (245 lb 13 oz)   SpO2 (!) 93% Comment: 4 liters  BMI 40.91 kg/m²   Body mass index is 40.91 kg/m².     Physical Exam   Constitutional: She is oriented to person, place, and time. She appears well-developed and well-nourished.   Morbidly obese   HENT:   Head: Normocephalic and atraumatic.   Mallampati 3    Eyes: EOM are normal. Pupils are equal, round, and reactive to light.   Neck: Normal range of motion. Neck supple.   14.5   Cardiovascular: Normal rate and regular rhythm.   Pulmonary/Chest: Effort normal and breath sounds normal. "   Abdominal: Soft. Bowel sounds are normal.   Musculoskeletal: Normal range of motion.   Neurological: She is alert and oriented to person, place, and time.   Skin: Skin is warm and dry. Capillary refill takes less than 2 seconds.   Psychiatric: She has a normal mood and affect.   Nursing note and vitals reviewed.      Personal Diagnostic Review  Six minute walk distance is 60.96 / 425.93 meters  (14.31 % predicted) with very heavy dyspnea.   Peak VO2 during walking was 5.81 Ml/min/kg [ 1.66 METS].   Oxygen saturation was 84% on room air.   During exercise, there was significant desaturation while breathing 4 L/M  supplemental oxygen to 84%.   Oxygen saturation improved to 95% with oxygen supplementation at 6 L /min.   Blood pressure remained stable and Heart rate increased significantly with walking.  Hypertension was present prior to exercise.  This may represent a normal cardiovascular response to exercise.  The patient did not report non-pulmonary symptoms during exercise.  Severe exercise impairment is likely due to desaturation.  The patient did complete the study, walking 360 seconds of the 360 second test.  The patient may benefit from using supplemental oxygen during exertion.  No previous study performed.  Based upon age and body mass index, exercise capacity is less than predicted.        Assessment / Plan:       Discussed diagnosis, its evaluation, treatment and usual course. All questions answered.    Problem List Items Addressed This Visit        Pulmonary    CRLD (chronic restrictive lung disease) (Chronic)    Current Assessment & Plan     SECONDARY TO CHP.    Continue to monitor.          Chronic Hypersensitivity Pneumonitis - Primary    Current Assessment & Plan     CHP ROS: taking medications as instructed, no medication side effects noted, no significant ongoing wheezing or shortness of breath, using bronchodilator MDI less than twice a week.   New concerns: None.   Exam: chest rales noted  bilaterally.   Assessment:  CHP stable.   Plan: PREDNISONE 40MG PO DAILY Re evaluate in 1 month.          Relevant Medications    predniSONE (DELTASONE) 20 MG tablet    Chronic respiratory failure with hypoxia    Current Assessment & Plan     Continue oxygen supplementation at 4 - 6  l /min with exertion and 4 l/ min with sleep. Repeat 6MWT in 1 month after treatment with prednsione.              Relevant Orders    Six Minute Walk Test to qualify for Home Oxygen       Other    Class 3 severe obesity due to excess calories without serious comorbidity with body mass index (BMI) of 40.0 to 44.9 in adult    Current Assessment & Plan     General weight loss/lifestyle modification strategies discussed (elicit support from others; identify saboteurs; non-food rewards, etc).  Diet interventions: low calorie (1000 kCal/d) deficit diet.  Informal exercise measures discussed, e.g. taking stairs instead of elevator.                 TIME SPENT WITH PATIENT: Time spent: 45 minutes in face to face  discussion concerning diagnosis, prognosis, review of lab and test results, benefits of treatment as well as management of disease, counseling of patient and coordination of care between various health  care providers . Greater than half the time spent was used for coordination of care and counseling of patient.       Follow-up in about 1 month (around 3/19/2019) for Chronic Respiratory Failure,CHP, Restrictive Lung Disease.

## 2019-02-28 DIAGNOSIS — R05.9 COUGH: Primary | ICD-10-CM

## 2019-02-28 RX ORDER — BENZONATATE 100 MG/1
100 CAPSULE ORAL EVERY 4 HOURS PRN
Qty: 120 CAPSULE | Refills: 3 | Status: SHIPPED | OUTPATIENT
Start: 2019-02-28 | End: 2019-08-08 | Stop reason: SDUPTHER

## 2019-02-28 NOTE — TELEPHONE ENCOUNTER
----- Message from Kam Ladd sent at 2/28/2019 12:23 PM CST -----  Contact: Pt  Please give pt a call at [phone] she would like to see if BENZONATE can be called in for her bronchitis.      ..  [facility] [address] [facility]  [address] [facility] [address]  Phone: [phone] Fax: [phone]

## 2019-03-29 ENCOUNTER — TELEPHONE (OUTPATIENT)
Dept: INTERNAL MEDICINE | Facility: CLINIC | Age: 59
End: 2019-03-29

## 2019-03-29 NOTE — TELEPHONE ENCOUNTER
----- Message from Kye Angelo sent at 3/29/2019  2:53 PM CDT -----  Contact: pt  She's calling in regards to a referral to see a Urologist, pls advise, [phone] (home)

## 2019-03-29 NOTE — TELEPHONE ENCOUNTER
Patient called in regards to her wanting a referral to see neurology. Patient wants this referral due to her having bad migraines. Patient last seen on 12/18/18. Patient was informed that Dr. Box was out of the office on today and will return back on Monday. Patient verbally understood the information given.     Please Advise

## 2019-04-02 ENCOUNTER — TELEPHONE (OUTPATIENT)
Dept: INTERNAL MEDICINE | Facility: CLINIC | Age: 59
End: 2019-04-02

## 2019-04-02 NOTE — TELEPHONE ENCOUNTER
----- Message from Tonia Miller sent at 4/2/2019  2:48 PM CDT -----  Contact: self  Type:  Needs Medical Advice    Who Called: pt  Symptoms (please be specific):n/a   How long has patient had these symptoms:n/a  Pharmacy name and phone #:n/a  Would the patient rather a call back or a response via [facility]? Call back  Best Call Back Number: [phone]  Additional Information: requesting call back regarding status of referral to neurologist.    Thanks,  Tonia Miller

## 2019-04-02 NOTE — TELEPHONE ENCOUNTER
Patient called requesting an referral to neurology.  I advised the patient that a referral was placed back on 12/18/2018.  Patient verbally understood and requested neurology phone number.  I gave the number to her.

## 2019-04-15 ENCOUNTER — OFFICE VISIT (OUTPATIENT)
Dept: PULMONOLOGY | Facility: CLINIC | Age: 59
End: 2019-04-15
Payer: MEDICARE

## 2019-04-15 ENCOUNTER — CLINICAL SUPPORT (OUTPATIENT)
Dept: PULMONOLOGY | Facility: CLINIC | Age: 59
End: 2019-04-15
Payer: MEDICARE

## 2019-04-15 VITALS — BODY MASS INDEX: 43.72 KG/M2 | WEIGHT: 256.06 LBS | HEIGHT: 64 IN

## 2019-04-15 DIAGNOSIS — J98.4 CRLD (CHRONIC RESTRICTIVE LUNG DISEASE): Chronic | ICD-10-CM

## 2019-04-15 DIAGNOSIS — J67.9 HYPERSENSITIVITY PNEUMONITIS: Primary | Chronic | ICD-10-CM

## 2019-04-15 DIAGNOSIS — E66.01 CLASS 3 SEVERE OBESITY DUE TO EXCESS CALORIES WITHOUT SERIOUS COMORBIDITY WITH BODY MASS INDEX (BMI) OF 40.0 TO 44.9 IN ADULT: Chronic | ICD-10-CM

## 2019-04-15 DIAGNOSIS — J96.11 CHRONIC RESPIRATORY FAILURE WITH HYPOXIA: Chronic | ICD-10-CM

## 2019-04-15 PROCEDURE — 94618 PULMONARY STRESS TESTING: CPT | Mod: PBBFAC

## 2019-04-15 PROCEDURE — 94618 PULMONARY STRESS TESTING: ICD-10-PCS | Mod: 26,S$PBB,, | Performed by: INTERNAL MEDICINE

## 2019-04-15 PROCEDURE — 99215 OFFICE O/P EST HI 40 MIN: CPT | Mod: 25,S$PBB,, | Performed by: INTERNAL MEDICINE

## 2019-04-15 PROCEDURE — 99999 PR PBB SHADOW E&M-EST. PATIENT-LVL I: ICD-10-PCS | Mod: PBBFAC,,, | Performed by: INTERNAL MEDICINE

## 2019-04-15 PROCEDURE — 99211 OFF/OP EST MAY X REQ PHY/QHP: CPT | Mod: PBBFAC,25 | Performed by: INTERNAL MEDICINE

## 2019-04-15 PROCEDURE — 99999 PR PBB SHADOW E&M-EST. PATIENT-LVL I: CPT | Mod: PBBFAC,,, | Performed by: INTERNAL MEDICINE

## 2019-04-15 PROCEDURE — 99215 PR OFFICE/OUTPT VISIT, EST, LEVL V, 40-54 MIN: ICD-10-PCS | Mod: 25,S$PBB,, | Performed by: INTERNAL MEDICINE

## 2019-04-15 PROCEDURE — 94618 PULMONARY STRESS TESTING: CPT | Mod: 26,S$PBB,, | Performed by: INTERNAL MEDICINE

## 2019-04-15 RX ORDER — AZATHIOPRINE 50 MG/1
50 TABLET ORAL DAILY
Qty: 30 TABLET | Refills: 2 | Status: SHIPPED | OUTPATIENT
Start: 2019-04-15 | End: 2019-08-15

## 2019-04-15 NOTE — PROCEDURES
"[facility] - Pulm Function [facility]  Six Minute Walk     SUMMARY     Ordering Provider: Dr. Pradhan   Interpreting Provider: Dr. Pradhan  Performing nurse/tech/RT: Jarrett CRT  Diagnosis: (Chronic Respiratory Failure with Hypoxia)  Height: 5' 4" (162.6 cm)  Weight: 116.2 kg (256 lb 1 oz)  BMI (Calculated): 44   Patient Race:             Phase Oxygen Assessment Supplemental O2 Heart   Rate Blood Pressure Sophie Dyspnea Scale Rating   Resting 94 % Room Air 97 bpm 147/88 3   Exercise        Minute        1 86 % Room Air 118 bpm     2 87 % 2 L/M 122 bpm     3 86 % 4 L/M 143 bpm     4 89 % 6 L/M 139 bpm     5 88 % 6 L/M 141 bpm     6  85 % Other (see comments)(8 LPM) 144 bpm 134/44 7-8   Recovery        Minute        1 92 % Other (see comments)(8 LPM) 147 bpm     2 95 % 6 L/M 129 bpm     3 97 % 6 L/M 114 bpm     4 98 % 4 L/M 98 bpm 148/76 2     Six Minute Walk Summary  6MWT Status: completed without stopping  Patient Reported: Dyspnea     Interpretation:  Did the patient stop or pause?: No                                         Total Time Walked (Calculated): 360 seconds  Final Partial Lap Distance (feet): 150 feet  Total Distance Meters (Calculated): 289.56 meters  Predicted Distance Meters (Calculated): 408.86 meters  Percentage of Predicted (Calculated): 70.82  Peak VO2 (Calculated): 12.67  Mets: 3.62  Has The Patient Had a Previous Six Minute Walk Test?: Yes       Previous 6MWT Results  Has The Patient Had a Previous Six Minute Walk Test?: Yes  Date of Previous Test: 02/07/19  Total Time Walked: 36 seconds  Total Distance (meters): 60.96  Predicted Distance (meters): 425.93 meters  Percentage of Predicted: 14.31  Percent Change (Calculated): -3.75        PHYSICIAN INTERPRETATION:    Six minute walk distance is 289.56 / 408.86 meters (70.82 % predicted) with very, very heavy dyspnea. Peak VO2 during walking was 12.67 ml/min/kg [ 3.62 METS]. During exercise, there was significant desaturation while breathing room air " to 86%. Oxygen saturation improved to 95% with oxygen supplementation at 6 L /min. Blood pressure remained stable and Heart rate increased significantly with walking.  Hypertension was present prior to exercise.  This may represent a bnormal cardiovascular response to exercise.  The patient did not report non-pulmonary symptoms during exercise.  The patient did complete the study, walking 360 seconds of the 360 second test.  The patient may benefit from using supplemental oxygen.  Since the previous study in 02/07/19, exercise capacity is significantly improved.  Based upon age and body mass index, exercise capacity is normal.

## 2019-04-15 NOTE — ASSESSMENT & PLAN NOTE
CHP ROS: taking medications as instructed, no medication side effects noted, no significant ongoing wheezing or shortness of breath, using bronchodilator MDI less than twice a week.   New concerns: None.   Exam: chest rales noted bilaterally.   Assessment:  CHP stable.   Plan: Continue PREDNISONE 40MG PO DAILY : Start IMURAN  Per orders    MONITORING PARAMETERS:    1. CBC with differential and platelets (weekly during first month, twice monthly for months 2 and 3, then monthly thereafter; monitor more frequently with dosage modifications)  2. Total bilirubin, liver function tests (every 3 months) and creatinine clearance.   3. Monitor for signs/symptoms of infection and malignancy (eg, splenomegaly. hepatomegaly, abdominal pain, persistent fever, night sweats, weight loss).   4. Azathioprine has been associated with skin cancer with long-term use after renal transplantation.   5. Patients taking azathioprine for a prolonged time period should avoid sun exposure and be monitored for skin cancer regularly

## 2019-04-15 NOTE — PROGRESS NOTES
Subjective:      Established patient    Patient ID: Serene Rod is a 58 y.o. female.  Patient Active Problem List   Diagnosis    Hyperglycemia    Eczema    Hypertension, well controlled    Hx of migraine headaches    Encounter to establish care with new doctor    Shortness of breath    Hyperlipidemia    Chronic Hypersensitivity Pneumonitis    Class 3 severe obesity due to excess calories without serious comorbidity with body mass index (BMI) of 40.0 to 44.9 in adult    Chronic respiratory failure with hypoxia    CRLD (chronic restrictive lung disease)       Problem list has been reviewed.    Chief Complaint: No chief complaint on file.      HPI      CHP:     6MWT reviewed with patient who expressed and voiced understanding.   All questions were answered to the patients satisfaction.    Simple pulmonary stress test needs to be repeated.     Patients reports progressive dyspnea NYHA III dyspnea    The patient does not have currently have symptoms / an exacerbation.      She is on continuous oxygen supplementation @ 4 - 6 L /min .     Reports recent improvement in breathing.     A full  review of systems, past , family  and social histories was performed except as mentioned in the note above, these are non contributory to the main issues discussed today.       Previous Report Reviewed: lab reports, office notes and radiology reports     The following portions of the patient's history were reviewed and updated as appropriate: She  has a past medical history of Allergy and Migraines.  She  has a past surgical history that includes neurostimulator and Lung biopsy.  Her family history includes Alcohol abuse in her brother; Emphysema in her father; Heart attack in her maternal grandfather; Heart disease in her brother, father, and mother; Lung cancer in her mother; Stroke in her father and mother.  She  reports that she has never smoked. She has never used smokeless tobacco. She reports that she drinks alcohol.  Her drug history is not on file.  She has a current medication list which includes the following prescription(s): bupropion hcl, escitalopram oxalate, fluticasone, naratriptan, prednisone, promethazine, tizanidine, topiramate, triamcinolone acetonide 0.1%, verapamil, azathioprine, clonidine, sumatriptan, and sumatriptan.  She is allergic to cat/feline products and grass pollen-virgie grass standard..    Review of Systems   Constitutional: Positive for night sweats. Negative for chills and fatigue.   HENT: Positive for postnasal drip, rhinorrhea, voice change and congestion.    Eyes: Negative for itching.   Respiratory: Positive for apnea, snoring, shortness of breath, wheezing and dyspnea on extertion. Negative for hemoptysis and somnolence.    Cardiovascular: Positive for leg swelling. Negative for chest pain and palpitations.   Genitourinary: Negative for difficulty urinating and hematuria.   Endocrine: Negative for cold intolerance and heat intolerance.    Musculoskeletal: Positive for back pain.   Skin: Negative for rash.   Gastrointestinal: Positive for nausea and acid reflux. Negative for vomiting, abdominal pain and abdominal distention.   Neurological: Positive for dizziness and headaches.   Hematological: Excessive bruising.   Psychiatric/Behavioral: The patient is nervous/anxious.    All other systems reviewed and are negative.       Objective:     There were no vitals taken for this visit.  There is no height or weight on file to calculate BMI.     Physical Exam   Constitutional: She is oriented to person, place, and time. She appears well-developed and well-nourished.   Morbidly obese   HENT:   Head: Normocephalic and atraumatic.   Mallampati 3    Eyes: Pupils are equal, round, and reactive to light. EOM are normal.   Neck: Normal range of motion. Neck supple.   14.5   Cardiovascular: Normal rate and regular rhythm.   Pulmonary/Chest: Effort normal and breath sounds normal.   Abdominal: Soft. Bowel sounds  are normal.   Musculoskeletal: Normal range of motion.   Neurological: She is alert and oriented to person, place, and time.   Skin: Skin is warm and dry. Capillary refill takes less than 2 seconds.   Psychiatric: She has a normal mood and affect.   Nursing note and vitals reviewed.      Personal Diagnostic Review      Six minute walk distance is 289.56 / 408.86 meters (70.82 % predicted) with very, very heavy dyspnea. Peak VO2 during walking was 12.67 ml/min/kg [ 3.62 METS]. During exercise, there was significant desaturation while breathing room air to 86%. Oxygen saturation improved to 95% with oxygen supplementation at 6 L /min. Blood pressure remained stable and Heart rate increased significantly with walking.  Hypertension was present prior to exercise.  This may represent a bnormal cardiovascular response to exercise.  The patient did not report non-pulmonary symptoms during exercise.  The patient did complete the study, walking 360 seconds of the 360 second test.  The patient may benefit from using supplemental oxygen.  Since the previous study in 02/07/19, exercise capacity is significantly improved.  Based upon age and body mass index, exercise capacity is normal.    Assessment / Plan:       Discussed diagnosis, its evaluation, treatment and usual course. All questions answered.    Problem List Items Addressed This Visit        Pulmonary    CRLD (chronic restrictive lung disease) (Chronic)    Current Assessment & Plan     SECONDARY TO CHP.    Continue to monitor.          Chronic Hypersensitivity Pneumonitis - Primary    Current Assessment & Plan     CHP ROS: taking medications as instructed, no medication side effects noted, no significant ongoing wheezing or shortness of breath, using bronchodilator MDI less than twice a week.   New concerns: None.   Exam: chest rales noted bilaterally.   Assessment:  CHP stable.   Plan: Continue PREDNISONE 40MG PO DAILY : Start IMURAN  Per orders    MONITORING  PARAMETERS:    1. CBC with differential and platelets (weekly during first month, twice monthly for months 2 and 3, then monthly thereafter; monitor more frequently with dosage modifications)  2. Total bilirubin, liver function tests (every 3 months) and creatinine clearance.   3. Monitor for signs/symptoms of infection and malignancy (eg, splenomegaly. hepatomegaly, abdominal pain, persistent fever, night sweats, weight loss).   4. Azathioprine has been associated with skin cancer with long-term use after renal transplantation.   5. Patients taking azathioprine for a prolonged time period should avoid sun exposure and be monitored for skin cancer regularly           Relevant Medications    azaTHIOprine (IMURAN) 50 mg Tab    Other Relevant Orders    Comprehensive metabolic panel    CBC auto differential    Chronic respiratory failure with hypoxia    Current Assessment & Plan     Continue oxygen supplementation at 4 - 6  l /min with exertion and 4 l/ min with sleep.             Other    Class 3 severe obesity due to excess calories without serious comorbidity with body mass index (BMI) of 40.0 to 44.9 in adult    Current Assessment & Plan     General weight loss/lifestyle modification strategies discussed (elicit support from others; identify saboteurs; non-food rewards, etc).  Diet interventions: low calorie (1000 kCal/d) deficit diet.  Informal exercise measures discussed, e.g. taking stairs instead of elevator.                 TIME SPENT WITH PATIENT: Time spent: 45 minutes in face to face  discussion concerning diagnosis, prognosis, review of lab and test results, benefits of treatment as well as management of disease, counseling of patient and coordination of care between various health  care providers . Greater than half the time spent was used for coordination of care and counseling of patient.       Follow up in about 1 month (around 5/15/2019) for Chronic Respiratory Failure, CHP, Morbid Obesity, Restrictive  Lung Disease.

## 2019-04-24 ENCOUNTER — LAB VISIT (OUTPATIENT)
Dept: LAB | Facility: HOSPITAL | Age: 59
End: 2019-04-24
Attending: INTERNAL MEDICINE
Payer: MEDICARE

## 2019-04-24 DIAGNOSIS — J67.9 HYPERSENSITIVITY PNEUMONITIS: Chronic | ICD-10-CM

## 2019-04-24 LAB
BASOPHILS # BLD AUTO: 0.12 K/UL (ref 0–0.2)
BASOPHILS NFR BLD: 0.6 % (ref 0–1.9)
DIFFERENTIAL METHOD: ABNORMAL
EOSINOPHIL # BLD AUTO: 0.2 K/UL (ref 0–0.5)
EOSINOPHIL NFR BLD: 0.8 % (ref 0–8)
ERYTHROCYTE [DISTWIDTH] IN BLOOD BY AUTOMATED COUNT: 14.5 % (ref 11.5–14.5)
HCT VFR BLD AUTO: 42.6 % (ref 37–48.5)
HGB BLD-MCNC: 13.7 G/DL (ref 12–16)
IMM GRANULOCYTES # BLD AUTO: 0.58 K/UL (ref 0–0.04)
IMM GRANULOCYTES NFR BLD AUTO: 2.7 % (ref 0–0.5)
LYMPHOCYTES # BLD AUTO: 2.9 K/UL (ref 1–4.8)
LYMPHOCYTES NFR BLD: 13.7 % (ref 18–48)
MCH RBC QN AUTO: 31 PG (ref 27–31)
MCHC RBC AUTO-ENTMCNC: 32.2 G/DL (ref 32–36)
MCV RBC AUTO: 96 FL (ref 82–98)
MONOCYTES # BLD AUTO: 0.9 K/UL (ref 0.3–1)
MONOCYTES NFR BLD: 4.4 % (ref 4–15)
NEUTROPHILS # BLD AUTO: 16.4 K/UL (ref 1.8–7.7)
NEUTROPHILS NFR BLD: 77.8 % (ref 38–73)
NRBC BLD-RTO: 0 /100 WBC
PLATELET # BLD AUTO: 285 K/UL (ref 150–350)
PMV BLD AUTO: 9.3 FL (ref 9.2–12.9)
RBC # BLD AUTO: 4.42 M/UL (ref 4–5.4)
WBC # BLD AUTO: 21.12 K/UL (ref 3.9–12.7)

## 2019-04-24 PROCEDURE — 36415 COLL VENOUS BLD VENIPUNCTURE: CPT

## 2019-04-24 PROCEDURE — 85025 COMPLETE CBC W/AUTO DIFF WBC: CPT

## 2019-05-03 ENCOUNTER — TELEPHONE (OUTPATIENT)
Dept: PULMONOLOGY | Facility: CLINIC | Age: 59
End: 2019-05-03

## 2019-05-03 DIAGNOSIS — J96.11 CHRONIC RESPIRATORY FAILURE WITH HYPOXIA: Primary | ICD-10-CM

## 2019-05-03 NOTE — TELEPHONE ENCOUNTER
----- Message from Hannah Phan sent at 5/3/2019 12:23 PM CDT -----  Contact: self [phone]  Pt would like return call from nurse regarding order of oxygen concentrator; pt states order that was sent to [facility] was invalid. Please call back at [phone].   Md Beth

## 2019-05-03 NOTE — TELEPHONE ENCOUNTER
Patient informed that order and testing was sent to [facility]. Spoke with Jensen he states that he will send for Medicare approval

## 2019-05-03 NOTE — TELEPHONE ENCOUNTER
----- Message from Lise Lorenz sent at 5/2/2019  4:06 PM CDT -----  Contact: PATIENT  CALLING CONCERNING GETTING AN ORDER FOR OXYGEN EQUIPMENT FAX [phone] AND CALL PATIENT [phone]. THANKS, JENNIFER

## 2019-05-09 DIAGNOSIS — J67.9 HYPERSENSITIVITY PNEUMONITIS: Chronic | ICD-10-CM

## 2019-05-10 RX ORDER — PREDNISONE 20 MG/1
TABLET ORAL
Qty: 60 TABLET | Refills: 1 | Status: SHIPPED | OUTPATIENT
Start: 2019-05-10 | End: 2019-10-11 | Stop reason: SDUPTHER

## 2019-05-11 ENCOUNTER — LAB VISIT (OUTPATIENT)
Dept: LAB | Facility: HOSPITAL | Age: 59
End: 2019-05-11
Attending: INTERNAL MEDICINE
Payer: MEDICARE

## 2019-05-11 DIAGNOSIS — J67.9 HYPERSENSITIVITY PNEUMONITIS: Chronic | ICD-10-CM

## 2019-05-11 LAB
BASOPHILS # BLD AUTO: 0.14 K/UL (ref 0–0.2)
BASOPHILS NFR BLD: 0.8 % (ref 0–1.9)
DIFFERENTIAL METHOD: ABNORMAL
EOSINOPHIL # BLD AUTO: 0.2 K/UL (ref 0–0.5)
EOSINOPHIL NFR BLD: 1.2 % (ref 0–8)
ERYTHROCYTE [DISTWIDTH] IN BLOOD BY AUTOMATED COUNT: 14.5 % (ref 11.5–14.5)
HCT VFR BLD AUTO: 44.3 % (ref 37–48.5)
HGB BLD-MCNC: 14.1 G/DL (ref 12–16)
IMM GRANULOCYTES # BLD AUTO: 0.45 K/UL (ref 0–0.04)
IMM GRANULOCYTES NFR BLD AUTO: 2.5 % (ref 0–0.5)
LYMPHOCYTES # BLD AUTO: 4.9 K/UL (ref 1–4.8)
LYMPHOCYTES NFR BLD: 27.5 % (ref 18–48)
MCH RBC QN AUTO: 31.4 PG (ref 27–31)
MCHC RBC AUTO-ENTMCNC: 31.8 G/DL (ref 32–36)
MCV RBC AUTO: 99 FL (ref 82–98)
MONOCYTES # BLD AUTO: 1.4 K/UL (ref 0.3–1)
MONOCYTES NFR BLD: 7.7 % (ref 4–15)
NEUTROPHILS # BLD AUTO: 10.8 K/UL (ref 1.8–7.7)
NEUTROPHILS NFR BLD: 60.3 % (ref 38–73)
NRBC BLD-RTO: 0 /100 WBC
PLATELET # BLD AUTO: 279 K/UL (ref 150–350)
PMV BLD AUTO: 9.2 FL (ref 9.2–12.9)
RBC # BLD AUTO: 4.49 M/UL (ref 4–5.4)
WBC # BLD AUTO: 17.91 K/UL (ref 3.9–12.7)

## 2019-05-11 PROCEDURE — 36415 COLL VENOUS BLD VENIPUNCTURE: CPT

## 2019-05-11 PROCEDURE — 85025 COMPLETE CBC W/AUTO DIFF WBC: CPT

## 2019-06-10 RX ORDER — VERAPAMIL HYDROCHLORIDE 180 MG/1
TABLET, FILM COATED, EXTENDED RELEASE ORAL
Qty: 30 TABLET | Refills: 5 | Status: SHIPPED | OUTPATIENT
Start: 2019-06-10 | End: 2020-02-17 | Stop reason: SDUPTHER

## 2019-06-19 ENCOUNTER — TELEPHONE (OUTPATIENT)
Dept: PULMONOLOGY | Facility: CLINIC | Age: 59
End: 2019-06-19

## 2019-06-19 NOTE — TELEPHONE ENCOUNTER
Patient states that Imuran side effects (nausea, vomiting, headaches and malaise) was affecting her to great. She states that since she stopped taking she is feeling better.

## 2019-08-08 DIAGNOSIS — R05.9 COUGH: ICD-10-CM

## 2019-08-08 RX ORDER — BENZONATATE 100 MG/1
CAPSULE ORAL
Qty: 120 CAPSULE | Refills: 3 | Status: SHIPPED | OUTPATIENT
Start: 2019-08-08 | End: 2020-02-03 | Stop reason: SDUPTHER

## 2019-08-08 RX ORDER — TIZANIDINE 4 MG/1
4 TABLET ORAL DAILY
Qty: 14 TABLET | Refills: 0 | Status: SHIPPED | OUTPATIENT
Start: 2019-08-08 | End: 2019-08-22

## 2019-08-08 RX ORDER — CLONIDINE HYDROCHLORIDE 0.1 MG/1
TABLET ORAL
Qty: 30 TABLET | Refills: 0 | Status: SHIPPED | OUTPATIENT
Start: 2019-08-08 | End: 2019-10-11 | Stop reason: SDUPTHER

## 2019-08-08 RX ORDER — SUMATRIPTAN SUCCINATE 100 MG/1
TABLET ORAL
Qty: 14 TABLET | Refills: 0 | Status: SHIPPED | OUTPATIENT
Start: 2019-08-08 | End: 2019-10-15 | Stop reason: SDUPTHER

## 2019-08-08 RX ORDER — PROMETHAZINE HYDROCHLORIDE 25 MG/1
TABLET ORAL
Qty: 45 TABLET | Refills: 0 | Status: SHIPPED | OUTPATIENT
Start: 2019-08-08 | End: 2019-10-11 | Stop reason: SDUPTHER

## 2019-08-13 ENCOUNTER — OFFICE VISIT (OUTPATIENT)
Dept: NEUROLOGY | Facility: CLINIC | Age: 59
End: 2019-08-13
Payer: MEDICARE

## 2019-08-13 ENCOUNTER — TELEPHONE (OUTPATIENT)
Dept: PHARMACY | Facility: CLINIC | Age: 59
End: 2019-08-13

## 2019-08-13 VITALS
WEIGHT: 265.63 LBS | SYSTOLIC BLOOD PRESSURE: 126 MMHG | BODY MASS INDEX: 45.35 KG/M2 | DIASTOLIC BLOOD PRESSURE: 68 MMHG | HEART RATE: 68 BPM | HEIGHT: 64 IN

## 2019-08-13 DIAGNOSIS — J98.4 CRLD (CHRONIC RESTRICTIVE LUNG DISEASE): Chronic | ICD-10-CM

## 2019-08-13 DIAGNOSIS — R06.02 SHORTNESS OF BREATH: ICD-10-CM

## 2019-08-13 DIAGNOSIS — E66.01 CLASS 3 SEVERE OBESITY DUE TO EXCESS CALORIES WITHOUT SERIOUS COMORBIDITY WITH BODY MASS INDEX (BMI) OF 40.0 TO 44.9 IN ADULT: ICD-10-CM

## 2019-08-13 DIAGNOSIS — Z86.69 HX OF MIGRAINE HEADACHES: ICD-10-CM

## 2019-08-13 DIAGNOSIS — R73.9 HYPERGLYCEMIA: ICD-10-CM

## 2019-08-13 DIAGNOSIS — J96.11 CHRONIC RESPIRATORY FAILURE WITH HYPOXIA: ICD-10-CM

## 2019-08-13 DIAGNOSIS — L30.9 ECZEMA, UNSPECIFIED TYPE: ICD-10-CM

## 2019-08-13 DIAGNOSIS — G43.011 INTRACTABLE MIGRAINE WITHOUT AURA AND WITH STATUS MIGRAINOSUS: Primary | ICD-10-CM

## 2019-08-13 DIAGNOSIS — E78.5 HYPERLIPIDEMIA, UNSPECIFIED HYPERLIPIDEMIA TYPE: ICD-10-CM

## 2019-08-13 DIAGNOSIS — J67.9 HYPERSENSITIVITY PNEUMONITIS: ICD-10-CM

## 2019-08-13 DIAGNOSIS — I10 HYPERTENSION, WELL CONTROLLED: ICD-10-CM

## 2019-08-13 PROBLEM — Z76.89 ENCOUNTER TO ESTABLISH CARE WITH NEW DOCTOR: Status: RESOLVED | Noted: 2018-12-18 | Resolved: 2019-08-13

## 2019-08-13 PROCEDURE — 99205 OFFICE O/P NEW HI 60 MIN: CPT | Mod: S$PBB,,, | Performed by: PSYCHIATRY & NEUROLOGY

## 2019-08-13 PROCEDURE — 99999 PR PBB SHADOW E&M-EST. PATIENT-LVL III: ICD-10-PCS | Mod: PBBFAC,,, | Performed by: PSYCHIATRY & NEUROLOGY

## 2019-08-13 PROCEDURE — 99205 PR OFFICE/OUTPT VISIT, NEW, LEVL V, 60-74 MIN: ICD-10-PCS | Mod: S$PBB,,, | Performed by: PSYCHIATRY & NEUROLOGY

## 2019-08-13 PROCEDURE — 99213 OFFICE O/P EST LOW 20 MIN: CPT | Mod: PBBFAC | Performed by: PSYCHIATRY & NEUROLOGY

## 2019-08-13 PROCEDURE — 99999 PR PBB SHADOW E&M-EST. PATIENT-LVL III: CPT | Mod: PBBFAC,,, | Performed by: PSYCHIATRY & NEUROLOGY

## 2019-08-13 RX ORDER — TOPIRAMATE 100 MG/1
100 TABLET, FILM COATED ORAL 2 TIMES DAILY
Qty: 180 TABLET | Refills: 3 | Status: SHIPPED | OUTPATIENT
Start: 2019-08-13 | End: 2020-01-29 | Stop reason: SDUPTHER

## 2019-08-13 NOTE — PROGRESS NOTES
Subjective:       Patient ID: Serene Rod is a 59 y.o. female.    Chief Complaint: No chief complaint on file.    HPI         The patient started having migraine headaches in 2001 (throbbing moderate-severe pain with shifting location, nausea, vomiting, light and noise sensitivity) with unremarkable Brain MRIs. The headaches are daily and last the who day. The headaches have proven to be very intractable. She failed BB/Inderal, AED/TPM, VPA, TCAs/Elavil, Botox , Aimovig and CAM (B2/Mg/CoQ/Butterburr/Feverfew). The patient has a neurostimulator and along with TPM help by 10-15%. Abortive NSAIDs and Triptans (Amerge/Imitrex) help sporadically. She even tried ear tragus ring.         Review of Systems   Constitutional: Positive for fatigue and unexpected weight change. Negative for appetite change.   HENT: Negative for hearing loss and tinnitus.    Eyes: Negative for photophobia and visual disturbance.   Respiratory: Positive for shortness of breath. Negative for apnea.    Cardiovascular: Negative for chest pain and palpitations.   Gastrointestinal: Negative for nausea and vomiting.   Endocrine: Negative for cold intolerance and heat intolerance.   Genitourinary: Negative for difficulty urinating and urgency.   Musculoskeletal: Positive for arthralgias. Negative for back pain, gait problem, joint swelling, myalgias, neck pain and neck stiffness.   Skin: Negative for color change and rash.   Allergic/Immunologic: Negative for environmental allergies and immunocompromised state.   Neurological: Positive for headaches. Negative for dizziness, tremors, seizures, syncope, facial asymmetry, speech difficulty, weakness, light-headedness and numbness.   Hematological: Negative for adenopathy. Does not bruise/bleed easily.   Psychiatric/Behavioral: Negative for agitation, behavioral problems, confusion, decreased concentration, dysphoric mood, hallucinations, self-injury, sleep disturbance and suicidal ideas. The patient is not  nervous/anxious and is not hyperactive.          Current Outpatient Medications:     benzonatate (TESSALON) 100 MG capsule, TAKE 1 CAPSULE BY MOUTH EVERY 4 HOURS AS NEEDED FOR COUGH, Disp: 120 capsule, Rfl: 3    bupropion HCl (WELLBUTRIN ORAL), Take by mouth., Disp: , Rfl:     cloNIDine (CATAPRES) 0.1 MG tablet, TAKE 1 TABLET BY MOUTH ONCE DAILY, Disp: 30 tablet, Rfl: 0    escitalopram oxalate (LEXAPRO ORAL), Take by mouth., Disp: , Rfl:     fluticasone (FLONASE) 50 mcg/actuation nasal spray, 2 sprays (100 mcg total) by Each Nare route once daily., Disp: 1 Bottle, Rfl: 5    naratriptan (AMERGE) 2.5 MG tablet, Qd prn, Disp: 9 tablet, Rfl: 5    predniSONE (DELTASONE) 20 MG tablet, TAKE 2 TABLETS BY MOUTH ONCE DAILY, Disp: 60 tablet, Rfl: 1    promethazine (PHENERGAN) 25 MG tablet, TAKE 1 TABLET BY MOUTH EVERY 4 HOURS AS NEEDED FOR NAUSEA, Disp: 45 tablet, Rfl: 0    sumatriptan (IMITREX) 100 MG tablet, Take one tablet. May repeat after 2 hours. Do not exceed 200mg per day, Disp: 14 tablet, Rfl: 0    tiZANidine (ZANAFLEX) 4 MG tablet, Take 1 tablet (4 mg total) by mouth once daily. for 14 days, Disp: 14 tablet, Rfl: 0    topiramate (TOPAMAX) 100 MG tablet, Take 1 tablet (100 mg total) by mouth 2 (two) times daily., Disp: 180 tablet, Rfl: 3    verapamil (CALAN-SR) 180 MG CR tablet, TAKE 1 TABLET BY MOUTH ONCE DAILY, Disp: 30 tablet, Rfl: 5    azaTHIOprine (IMURAN) 50 mg Tab, Take 1 tablet (50 mg total) by mouth once daily., Disp: 30 tablet, Rfl: 2    galcanezumab-gnlm (EMGALITY PEN) 120 mg/mL PnIj, Inject 240 mg into the skin every 28 days., Disp: 2 mL, Rfl: 0    galcanezumab-gnlm (EMGALITY PEN) 120 mg/mL PnIj, Inject 120 mg into the skin every 28 days., Disp: 1 mL, Rfl: 11    SUMAtriptan (IMITREX) 20 mg/actuation nasal spray, 1 spray (20 mg total) by Nasal route once. for 1 dose, Disp: 10 each, Rfl: 5    triamcinolone acetonide 0.1% (KENALOG) 0.1 % cream, Apply topically 2 (two) times daily., Disp: 45  g, Rfl: 5  Past Medical History:   Diagnosis Date    Allergy     Migraines      Past Surgical History:   Procedure Laterality Date    LUNG BIOPSY      neurostimulator      migraines     Social History     Socioeconomic History    Marital status: Single     Spouse name: Not on file    Number of children: Not on file    Years of education: Not on file    Highest education level: Not on file   Occupational History    Not on file   Social Needs    Financial resource strain: Not on file    Food insecurity:     Worry: Not on file     Inability: Not on file    Transportation needs:     Medical: Not on file     Non-medical: Not on file   Tobacco Use    Smoking status: Never Smoker    Smokeless tobacco: Never Used   Substance and Sexual Activity    Alcohol use: Yes    Drug use: Not on file    Sexual activity: Not Currently   Lifestyle    Physical activity:     Days per week: Not on file     Minutes per session: Not on file    Stress: Not on file   Relationships    Social connections:     Talks on phone: Not on file     Gets together: Not on file     Attends [employer] service: Not on file     Active member of club or organization: Not on file     Attends meetings of clubs or organizations: Not on file     Relationship status: Not on file   Other Topics Concern    Not on file   Social History Narrative    Not on file       Objective:     Vital signs reviewed     GENERAL APPEARANCE:     The patient looks comfortable.    No signs of medical or psychiatric distress.    Normal breathing pattern.    No dysmorphic features    Normal eye contact.     GENERAL MEDICAL EXAM:    HEENT:  Head is atraumatic normocephalic. No tender temporal arteries.     Neck and Axillae: No JVD. No carotid bruits. No thyromegaly. No lymphadenopathy.    Cardiopulmonary: No cyanosis. No tachypnea. Normal respiratory effort. On HOT  Coarse breath sounds. Normal heart sounds with regular rhythm and no murmurs.    Gastrointestinal:  No  stomas or lesions. No hernias.  Abdomen is soft non-tender. No masses or organomegaly.    Skin, Hair and Nails: No pathognonomic skin rash. No neurofibromatosis.   No stigmata of autoimmune disease.     Limbs: No varicose veins. Trace edema. Symmetric pulses.     Muskoskeletal: No deformities.No spine tenderness.   No signs of longstanding neuropathy. No dislocations or fractures.        Neurologic Exam     Mental Status   Oriented to person, place, and time.   Registration: recalls 3 of 3 objects. Recall at 5 minutes: recalls 3 of 3 objects. Follows 3 step commands.   Attention: normal. Concentration: normal.   Speech: speech is normal   Level of consciousness: alert  Knowledge: good and consistent with education. Able to perform simple calculations.   Able to name object. Able to read. Able to repeat. Able to write. Normal comprehension.     Cranial Nerves     CN II   Visual fields full to confrontation.   Visual acuity: normal  Right visual field deficit: none  Left visual field deficit: none     CN III, IV, VI   Pupils are equal, round, and reactive to light.  Extraocular motions are normal.   Right pupil: Size: 2 mm. Shape: regular. Reactivity: brisk. Consensual response: intact. Accommodation: intact.   Left pupil: Size: 2 mm. Shape: regular. Reactivity: brisk. Consensual response: intact. Accommodation: intact.   CN III: no CN III palsy  CN VI: no CN VI palsy  Nystagmus: none   Diplopia: none  Ophthalmoparesis: none  Upgaze: normal  Downgaze: normal  Conjugate gaze: present  Vestibulo-ocular reflex: present    CN V   Facial sensation intact.   Right facial sensation deficit: none  Left facial sensation deficit: none  Right corneal reflex: normal  Left corneal reflex: normal    CN VII   Right facial weakness: none  Left facial weakness: none  Right taste: normal  Left taste: normal    CN VIII   CN VIII normal.   Hearing: intact  Right Rinne: AC > BC  Left Rinne: AC > BC  De Jesus: does not lateralize     CN IX, X    CN IX normal.   CN X normal.   Palate: symmetric  Right gag reflex: normal  Left gag reflex: normal    CN XI   CN XI normal.   Right sternocleidomastoid strength: normal  Left sternocleidomastoid strength: normal  Right trapezius strength: normal  Left trapezius strength: normal    CN XII   CN XII normal.   Tongue: not atrophic  Fasciculations: absent  Tongue deviation: none    Motor Exam   Muscle bulk: normal  Overall muscle tone: normal  Right arm tone: normal  Left arm tone: normal  Right arm pronator drift: absent  Left arm pronator drift: absent  Right leg tone: normal  Left leg tone: normal    Strength   Strength 5/5 throughout.   Right neck flexion: 5/5  Left neck flexion: 5/5  Right neck extension: 5/5  Left neck extension: 5/5  Right deltoid: 5/5  Left deltoid: 5/5  Right biceps: 5/5  Left biceps: 5/5  Right triceps: 5/5  Left triceps: 5/5  Right wrist flexion: 5/5  Left wrist flexion: 5/5  Right wrist extension: 5/5  Left wrist extension: 5/5  Right interossei: 5/5  Left interossei: 5/5  Right abdominals: 5/5  Left abdominals: 5/5  Right iliopsoas: 5/5  Left iliopsoas: 5/5  Right quadriceps: 5/5  Left quadriceps: 5/5  Right hamstrin/5  Left hamstrin/5  Right glutei: 5/5  Left glutei: 5/5  Right anterior tibial: 5/5  Left anterior tibial: 5/5  Right posterior tibial: 5/5  Left posterior tibial: 5/5  Right peroneal: 5/5  Left peroneal: 5/5  Right gastroc: 5/5  Left gastroc: 5/5    Sensory Exam   Light touch normal.   Right arm light touch: normal  Left arm light touch: normal  Right leg light touch: normal  Left leg light touch: normal  Vibration normal.   Right arm vibration: normal  Left arm vibration: normal  Right leg vibration: normal  Left leg vibration: normal  Proprioception normal.   Right arm proprioception: normal  Left arm proprioception: normal  Right leg proprioception: normal  Left leg proprioception: normal  Pinprick normal.   Right arm pinprick: normal  Left arm pinprick:  normal  Right leg pinprick: normal  Left leg pinprick: normal  Graphesthesia: normal  Stereognosis: normal    Gait, Coordination, and Reflexes     Gait  Gait: normal    Coordination   Romberg: negative  Finger to nose coordination: normal  Heel to shin coordination: normal  Tandem walking coordination: normal    Tremor   Resting tremor: absent  Intention tremor: absent  Action tremor: absent    Reflexes   Right brachioradialis: 2+  Left brachioradialis: 2+  Right biceps: 2+  Left biceps: 2+  Right triceps: 2+  Left triceps: 2+  Right patellar: 2+  Left patellar: 2+  Right achilles: 2+  Left achilles: 2+  Right : 2+  Left : 2+  Right plantar: normal  Left plantar: normal  Right Bartholomew: absent  Left Bartholomew: absent  Right ankle clonus: absent  Left ankle clonus: absent  Right pendular knee jerk: absent  Left pendular knee jerk: absent      Lab Results   Component Value Date    WBC 17.91 (H) 05/11/2019    HGB 14.1 05/11/2019    HCT 44.3 05/11/2019    MCV 99 (H) 05/11/2019     05/11/2019     Sodium   Date Value Ref Range Status   12/22/2018 137 136 - 145 mmol/L Final     Potassium   Date Value Ref Range Status   12/22/2018 4.2 3.5 - 5.1 mmol/L Final     Chloride   Date Value Ref Range Status   12/22/2018 102 95 - 110 mmol/L Final     CO2   Date Value Ref Range Status   12/22/2018 25 23 - 29 mmol/L Final     Glucose   Date Value Ref Range Status   12/22/2018 103 70 - 110 mg/dL Final     BUN, Bld   Date Value Ref Range Status   12/22/2018 18 6 - 20 mg/dL Final     Creatinine   Date Value Ref Range Status   02/01/2019 1.2 0.5 - 1.4 mg/dL Final     Calcium   Date Value Ref Range Status   12/22/2018 9.8 8.7 - 10.5 mg/dL Final     Total Protein   Date Value Ref Range Status   12/22/2018 7.7 6.0 - 8.4 g/dL Final     Albumin   Date Value Ref Range Status   12/22/2018 3.6 3.5 - 5.2 g/dL Final     Total Bilirubin   Date Value Ref Range Status   12/22/2018 0.4 0.1 - 1.0 mg/dL Final     Comment:     For infants and  newborns, interpretation of results should be based  on gestational age, weight and in agreement with clinical  observations.  Premature Infant recommended reference ranges:  Up to 24 hours.............<8.0 mg/dL  Up to 48 hours............<12.0 mg/dL  3-5 days..................<15.0 mg/dL  6-29 days.................<15.0 mg/dL       Alkaline Phosphatase   Date Value Ref Range Status   12/22/2018 152 (H) 55 - 135 U/L Final     AST   Date Value Ref Range Status   12/22/2018 29 10 - 40 U/L Final     ALT   Date Value Ref Range Status   12/22/2018 22 10 - 44 U/L Final     Anion Gap   Date Value Ref Range Status   12/22/2018 10 8 - 16 mmol/L Final     eGFR if    Date Value Ref Range Status   02/01/2019 58 (A) >60 mL/min/1.73 m^2 Final     eGFR if non    Date Value Ref Range Status   02/01/2019 50 (A) >60 mL/min/1.73 m^2 Final     Comment:     Calculation used to obtain the estimated glomerular filtration  rate (eGFR) is the CKD-EPI equation.            Assessment:       1. Intractable migraine without aura and with status migrainosus    2. CRLD (chronic restrictive lung disease)    3. Hyperglycemia    4. Eczema, unspecified type    5. Hypertension, well controlled    6. Hx of migraine headaches    7. Shortness of breath    8. Hyperlipidemia, unspecified hyperlipidemia type    9. Chronic Hypersensitivity Pneumonitis    10. Class 3 severe obesity due to excess calories without serious comorbidity with body mass index (BMI) of 40.0 to 44.9 in adult    11. Chronic respiratory failure with hypoxia        Plan:       INTRACTABLE REFRACTORY MIGRAINE         LIFESTYLE CHANGES:     Good sleep hygiene  Avoid general triggers like lack of sleep/too much sleep, prolonged sun exposure, excessive screen time and specific triggers based on you own diary   Minimize physical and emotional stress  Smoking avoidance and cessation  Limit caffeine drinks to 1-2 a day   Good hydration   Small frequent meals and  avoid skipping meals   Moderate 30-minute-long aerobic exercises 3 times/week. Avoid strenuous exercise       ABORTIVE MEDICATIONS:     Should only be taken 2-3 times/week to avoid rebound and overuse headaches.    Take at the ONSET of the headache sumatriptan (Imitrex)100 mg  PO/naratriptan (Amerge) 2.5 mg in combination with naproxen 500 mg PO or Ibuprofen 800 mg PO for headache without nausea or vomiting.  This regimen can be repeated only once in 24 hours after 2 hours.    When the headache is associated with vomiting I recommended that the patient takes Sumatriptan 3/6 mg Sub-Q  injection. I-explained to the patient that pain meds especially triptans should NOT be taken daily to avoid Rebound Headache and Overuse Headache.    Side effects of triptans were discussed and include rare cardiac and cerebral ischemia.  NSAIDs can cause peptic ulcers, renal insufficiency and may increase the risk of cardiovascular diseases.  SEs were discussed with the patient.  If the headache is severe and  persists beyond 36 hours the patient needs to come to the hospital.        PREVENTATIVE MEDICATIONS:       The patient failed all medically available options BB/Inderal, AED/TPM, VPA, TCAs/Elavil, Botox , Aimovig and CAM (B2/Mg/CoQ/Butterburr/Feverfew) and ear tragus ring    Will change Topiramate/Topamax to 100 mg BID which can cause mental slowing, transient tingling, kidney stones, weight loss and rarely glaucoma and visual field defects . The patient was encouraged to drink a lot of fluids.     Try Galcanezumab (Emgality) (Ligand Blocker): 120 mg SQ Pen monthly after a loading dose of 240 mg       OTHER OPTIONS:     TNS (Cefaly)     Cryo (Iovera)      Acupuncture      MEDICAL/SURGICAL COMORBIDITIES     All relevant medical comorbidities noted and managed by primary care physician and medical care team.          MISCELLANEOUS       I spent 60  minutes face to face with the patient    More than 30  minutes of the time spent in  counseling and coordination of care including discussions etiology of diagnosis, pathonogenesis of diagnosis, prognosis of diagnosis,, diagnostic results, impression and recommendations, diagnostic studies, management, risks and benefits of treatment, instructions of disease self management, treatment instructions, follow up requirements, patient and family counseling/involvement in care compliance with treatment regimen. All of the patient's questions were answered during this discussion.      RTC in 3 months                      Rodrigo Carballo MD, FAAN    Attending Neurologist/Epileptologist         Diplomate, American Board-Psychiatry and Neurology (Neurology)    Diplomate, American Board-Clinical Neurophysiology (Epilpesy-Neuromuscular)     Fellow, American Academy of Neurology

## 2019-08-14 NOTE — TELEPHONE ENCOUNTER
DOCUMENTATION ONLY:  Prior authorization for Emgality approved from 7/15/2019 to 8/13/2020    Case Id: [id number]    Co-pay: $3.80    Patient Assistance is not required. Forwarding to clinical pharmacist for consult and shipment.  ANTHONY

## 2019-08-15 ENCOUNTER — OFFICE VISIT (OUTPATIENT)
Dept: PULMONOLOGY | Facility: CLINIC | Age: 59
End: 2019-08-15
Payer: MEDICARE

## 2019-08-15 VITALS
SYSTOLIC BLOOD PRESSURE: 120 MMHG | DIASTOLIC BLOOD PRESSURE: 78 MMHG | BODY MASS INDEX: 45.54 KG/M2 | WEIGHT: 266.75 LBS | HEART RATE: 88 BPM | OXYGEN SATURATION: 95 % | RESPIRATION RATE: 18 BRPM | HEIGHT: 64 IN

## 2019-08-15 DIAGNOSIS — J98.4 CRLD (CHRONIC RESTRICTIVE LUNG DISEASE): Chronic | ICD-10-CM

## 2019-08-15 DIAGNOSIS — E66.01 CLASS 3 SEVERE OBESITY DUE TO EXCESS CALORIES WITHOUT SERIOUS COMORBIDITY WITH BODY MASS INDEX (BMI) OF 40.0 TO 44.9 IN ADULT: ICD-10-CM

## 2019-08-15 DIAGNOSIS — J67.9 HYPERSENSITIVITY PNEUMONITIS: Primary | ICD-10-CM

## 2019-08-15 DIAGNOSIS — J96.11 CHRONIC RESPIRATORY FAILURE WITH HYPOXIA: ICD-10-CM

## 2019-08-15 PROCEDURE — 99999 PR PBB SHADOW E&M-EST. PATIENT-LVL III: ICD-10-PCS | Mod: PBBFAC,,, | Performed by: INTERNAL MEDICINE

## 2019-08-15 PROCEDURE — 99215 PR OFFICE/OUTPT VISIT, EST, LEVL V, 40-54 MIN: ICD-10-PCS | Mod: S$PBB,,, | Performed by: INTERNAL MEDICINE

## 2019-08-15 PROCEDURE — 99213 OFFICE O/P EST LOW 20 MIN: CPT | Mod: PBBFAC | Performed by: INTERNAL MEDICINE

## 2019-08-15 PROCEDURE — 99215 OFFICE O/P EST HI 40 MIN: CPT | Mod: S$PBB,,, | Performed by: INTERNAL MEDICINE

## 2019-08-15 PROCEDURE — 99999 PR PBB SHADOW E&M-EST. PATIENT-LVL III: CPT | Mod: PBBFAC,,, | Performed by: INTERNAL MEDICINE

## 2019-08-15 RX ORDER — MYCOPHENOLATE MOFETIL 250 MG/1
500 CAPSULE ORAL 2 TIMES DAILY
Qty: 120 CAPSULE | Refills: 0 | Status: SHIPPED | OUTPATIENT
Start: 2019-08-15 | End: 2021-03-12 | Stop reason: SDUPTHER

## 2019-08-15 NOTE — ASSESSMENT & PLAN NOTE
Continue oxygen supplementation at 4 - 6  l /min with exertion and 4 l/ min with sleep.   6MWT in 6 months

## 2019-08-15 NOTE — ASSESSMENT & PLAN NOTE
CRLD ROS: SECONDARY TO CHP.  taking medications as instructed, no medication side effects noted, no significant ongoing wheezing or shortness of breath, using bronchodilator MDI less than twice a week.   New concerns: progressive dyspnea and cough   Exam: Bilateral Rales.   Assessment:  CRLD stable.   Plan: START TRELEGY PER ORDERS. Orders per EMR.

## 2019-08-15 NOTE — PATIENT INSTRUCTIONS
Mycophenolate capsules  What is this medicine?  MYCOPHENOLATE MOFETIL (sander VAZQUEZ oh late SILVANA fe til) is used to decrease the immune system's response to a transplanted organ.  How should I use this medicine?  Take this medicine by mouth with a full glass of water. Follow the directions on the prescription label. Take this medicine on an empty stomach, at least 1 hour before or 2 hours after food. Do not take with food unless your doctor approves. Swallow the medicine whole. Do not cut, crush, or chew the medicine. If the medicine is broken or is not intact, do not get the powder on your skin or eyes. If contact occurs, rinse thoroughly with water. Take your medicine at regular intervals. Do not take your medicine more often than directed. Do not stop taking except on your doctor's advice.  A special MedGuide will be given to you by the pharmacist with each prescription and refill. Be sure to read this information carefully each time.  Talk to your pediatrician regarding the use of this medicine in children. Special care may be needed.  What side effects may I notice from receiving this medicine?  Side effects that you should report to your doctor or health care professional as soon as possible:  · allergic reactions like skin rash, itching or hives, swelling of the face, lips, or tongue  · bloody, dark, or tarry stools  · changes in vision  · dizziness  · fever, chills or any other sign of infection  · unusual bleeding or bruising  · unusually weak or tired  Side effects that usually do not require medical attention (report to your doctor or health care professional if they continue or are bothersome):  · constipation  · diarrhea  · difficulty sleeping  · loss of appetite  · nausea, vomiting  What may interact with this medicine?  · acyclovir or valacyclovir  · antacids  · azathioprine  · birth control pills  · certain antibiotics like ciprofloxacin and amoxicillin; clavulanic acid  · ganciclovir or  valganciclovir  · lanthanum carbonate  · medicines for cholesterol like cholestyramine and colestipol  · metronidazole  · norfloxacin  · other mycophenolate medicines  · probenecid  · rifampin  · sevelamer  · vaccines  What if I miss a dose?  If you miss a dose, take it as soon as you can. If it is almost time for your next dose, take only that dose. Do not take double or extra doses.  Where should I keep my medicine?  Keep out of the reach of children.  Store at room temperature between 15 and 30 degrees C (59 and 86 degrees F). Throw away any unused medicine after the expiration date.  What should I tell my health care provider before I take this medicine?  They need to know if you have any of these conditions:  · anemia or other blood disorder  · diarrhea  · immune system problems  · infection  · kidney disease  · phenylketonuria  · stomach problems  · an unusual or allergic reaction to mycophenolate mofetil, other medicines, foods, dyes, or preservatives  · pregnant or trying to get pregnant  · breast-feeding  What should I watch for while using this medicine?  Visit your doctor or health care professional for regular checks on your progress. You will need frequent blood checks during the first few months you are receiving the medicine.  This medicine can make you more sensitive to the sun. Keep out of the sun. If you cannot avoid being in the sun, wear protective clothing and use sunscreen. Do not use sun lamps or tanning beds/booths.  This medicine can cause birth defects. Do not get pregnant while taking this drug. Females will need to have a negative pregnancy test before starting this medicine. If sexually active, use 2 reliable forms of birth control together for 4 weeks before starting this medicine, while you are taking this medicine, and for 6 weeks after you stop taking this medicine. Birth control pills alone may not work properly while you are taking this medicine. If you think that you might be  pregnant talk to your doctor right away.  If you get a cold or other infection while receiving this medicine, call your doctor or health care professional. Do not treat yourself. The medicine may decrease your body's ability to fight infections.  NOTE:This sheet is a summary. It may not cover all possible information. If you have questions about this medicine, talk to your doctor, pharmacist, or health care provider. Copyright© 2017 [employer]

## 2019-08-15 NOTE — ASSESSMENT & PLAN NOTE
CHP ROS: taking medications as instructed, no medication side effects noted, no significant ongoing wheezing or shortness of breath, using bronchodilator MDI less than twice a week.  INTOLERANT OF IMURAN.  New concerns: Progressive dyspnea and cough.   Exam: chest rales noted bilaterally.   Assessment:  CHP stable.   Plan: Continue PREDNISONE 40MG PO DAILY : Start CELLCEPT per orders.

## 2019-08-15 NOTE — PROGRESS NOTES
Subjective:      Established patient    Patient ID: Serene Rod is a 59 y.o. female.  Patient Active Problem List   Diagnosis    Hyperglycemia    Eczema    Hypertension, well controlled    Hx of migraine headaches    Hyperlipidemia    Chronic Hypersensitivity Pneumonitis    Class 3 severe obesity due to excess calories without serious comorbidity with body mass index (BMI) of 40.0 to 44.9 in adult    Chronic respiratory failure with hypoxia    CRLD (chronic restrictive lung disease)    Intractable migraine without aura and with status migrainosus       Problem list has been reviewed.    Chief Complaint: pneumonitis      HPI      CHP:     Patients reports progressive dyspnea NYHA III dyspnea    The patient does not have currently have symptoms / an exacerbation.      She is on continuous oxygen supplementation @ 4 - 6 L /min .     Reports recent improvement in breathing. She did not tolerate IMURAN.     Will initiate a trial of  CELLCEPT.    A full  review of systems, past , family  and social histories was performed except as mentioned in the note above, these are non contributory to the main issues discussed today.       Previous Report Reviewed: lab reports, office notes and radiology reports     The following portions of the patient's history were reviewed and updated as appropriate: She  has a past medical history of Allergy and Migraines.  She  has a past surgical history that includes neurostimulator and Lung biopsy.  Her family history includes Alcohol abuse in her brother; Emphysema in her father; Heart attack in her maternal grandfather; Heart disease in her brother, father, and mother; Lung cancer in her mother; Stroke in her father and mother.  She  reports that she has never smoked. She has never used smokeless tobacco. She reports that she drinks alcohol. Her drug history is not on file.  She has a current medication list which includes the following prescription(s): benzonatate, bupropion  "hcl, clonidine, escitalopram oxalate, fluticasone propionate, galcanezumab-gnlm, galcanezumab-gnlm, naratriptan, prednisone, promethazine, sumatriptan, tizanidine, topiramate, triamcinolone acetonide 0.1%, verapamil, fluticasone-umeclidin-vilanter, mycophenolate, and sumatriptan.  She is allergic to cat/feline products and grass pollen-virgie grass standard..    Review of Systems   Constitutional: Positive for night sweats. Negative for chills and fatigue.   HENT: Positive for postnasal drip, rhinorrhea, voice change and congestion.    Eyes: Negative for itching.   Respiratory: Positive for apnea, snoring, shortness of breath, wheezing and dyspnea on extertion. Negative for hemoptysis and somnolence.    Cardiovascular: Positive for leg swelling. Negative for chest pain and palpitations.   Genitourinary: Negative for difficulty urinating and hematuria.   Endocrine: Negative for cold intolerance and heat intolerance.    Musculoskeletal: Positive for back pain.   Skin: Negative for rash.   Gastrointestinal: Positive for nausea and acid reflux. Negative for vomiting, abdominal pain and abdominal distention.   Neurological: Positive for dizziness and headaches.   Hematological: Excessive bruising.   Psychiatric/Behavioral: The patient is nervous/anxious.    All other systems reviewed and are negative.       Objective:     /78   Pulse 88   Resp 18   Ht 5' 4" (1.626 m)   Wt 121 kg (266 lb 12.1 oz)   SpO2 95% Comment: 4L  BMI 45.79 kg/m²   Body mass index is 45.79 kg/m².     Physical Exam   Constitutional: She is oriented to person, place, and time. She appears well-developed and well-nourished.   Morbidly obese   HENT:   Head: Normocephalic and atraumatic.   Mallampati 3    Eyes: Pupils are equal, round, and reactive to light. EOM are normal.   Neck: Normal range of motion. Neck supple.   14.5   Cardiovascular: Normal rate and regular rhythm.   Pulmonary/Chest: Effort normal and breath sounds normal.   Abdominal: " Soft. Bowel sounds are normal.   Musculoskeletal: Normal range of motion.   Neurological: She is alert and oriented to person, place, and time.   Skin: Skin is warm and dry. Capillary refill takes less than 2 seconds.   Psychiatric: She has a normal mood and affect.   Nursing note and vitals reviewed.      Personal Diagnostic Review      Six minute walk TEST: 02/07/19:   Six minute walk distance is 289.56 / 408.86 meters (70.82 % predicted) with very, very heavy dyspnea. Peak VO2 during walking was 12.67 ml/min/kg [ 3.62 METS]. During exercise, there was significant desaturation while breathing room air to 86%. Oxygen saturation improved to 95% with oxygen supplementation at 6 L /min. Blood pressure remained stable and Heart rate increased significantly with walking.  Hypertension was present prior to exercise.  This may represent a bnormal cardiovascular response to exercise.  The patient did not report non-pulmonary symptoms during exercise.  The patient did complete the study, walking 360 seconds of the 360 second test.  The patient may benefit from using supplemental oxygen.  Since the previous study in 02/07/19, exercise capacity is significantly improved.  Based upon age and body mass index, exercise capacity is normal.    CT Chest With Contrast: 02/01/19:  1. There is a moderate amount of haziness scattered throughout both lungs.  This is consistent with the patient's history.  2. There is partial visualization of a 7.4 cm hypodense mass off of the midpole of the left kidney. This mass has a Hounsfield measurement of 7.  This is characteristic of a cyst.  3. A generator is located in the subcutaneous fat posterior to the right side of the abdomen. Its electrodes are visualized in the subcutaneous fat posterior to the right side of the neck, chest, and abdomen.      Assessment / Plan:       Discussed diagnosis, its evaluation, treatment and usual course. All questions answered.    Problem List Items Addressed  This Visit        Pulmonary    CRLD (chronic restrictive lung disease) (Chronic)    Current Assessment & Plan     CRLD ROS: SECONDARY TO CHP.  taking medications as instructed, no medication side effects noted, no significant ongoing wheezing or shortness of breath, using bronchodilator MDI less than twice a week.   New concerns: progressive dyspnea and cough   Exam: Bilateral Rales.   Assessment:  CRLD stable.   Plan: START TRELEGY PER ORDERS. Orders per EMR.          Relevant Medications    fluticasone-umeclidin-vilanter (TRELEGY ELLIPTA) 100-62.5-25 mcg DsDv    Chronic Hypersensitivity Pneumonitis - Primary    Current Assessment & Plan     CHP ROS: taking medications as instructed, no medication side effects noted, no significant ongoing wheezing or shortness of breath, using bronchodilator MDI less than twice a week.  INTOLERANT OF IMURAN.  New concerns: Progressive dyspnea and cough.   Exam: chest rales noted bilaterally.   Assessment:  CHP stable.   Plan: Continue PREDNISONE 40MG PO DAILY : Start CELLCEPT per orders.           Relevant Medications    mycophenolate (CELLCEPT) 250 mg Cap    Other Relevant Orders    Complete PFT with bronchodilator    Stress test, pulmonary    CT Chest Without Contrast    Chronic respiratory failure with hypoxia    Current Assessment & Plan     Continue oxygen supplementation at 4 - 6  l /min with exertion and 4 l/ min with sleep.   6MWT in 6 months            Other    Class 3 severe obesity due to excess calories without serious comorbidity with body mass index (BMI) of 40.0 to 44.9 in adult    Current Assessment & Plan     General weight loss/lifestyle modification strategies discussed (elicit support from others; identify saboteurs; non-food rewards, etc).  Diet interventions: low calorie (1000 kCal/d) deficit diet.  Informal exercise measures discussed, e.g. taking stairs instead of elevator.                 TIME SPENT WITH PATIENT: Time spent: 45 minutes in face to face   discussion concerning diagnosis, prognosis, review of lab and test results, benefits of treatment as well as management of disease, counseling of patient and coordination of care between various health  care providers . Greater than half the time spent was used for coordination of care and counseling of patient.       Follow up in about 6 months (around 2/15/2020) for Hypersensitivity Pneumonitis..

## 2019-08-19 ENCOUNTER — TELEPHONE (OUTPATIENT)
Dept: PHARMACY | Facility: CLINIC | Age: 59
End: 2019-08-19

## 2019-08-19 NOTE — TELEPHONE ENCOUNTER
Initial Emgality consult completed on  . Emgality 240mg (loading dose) will be shipped on  to arrive at patient's home on  via FedEx. $ 0 copay. Patient intends to start Emgality on . Address confirmed. Confirmed 2 patient identifiers - name and . Therapy Appropriate.    Indication: Migraine prophlaxis  goals of treatment: reduction in migraine frequency, intensity, and/or duration.     --Injection experience: Patient was previously on Aimovig.    Counseled patient on differences between the Aimovig and the Emgality:  - Inject two injections (240mg) into the skin as a loading dose, followed by one injection (120mg) once monthly  every 4 weeks thereafter.   - Take out of the refrigerator 30 minutes prior to injection to reach room temperature.  - Monthly RX will come with gauze, band aids, and alcohol swabs.  - Patient may self-inject in either the front/top of the thighs, abdomen- but at least 2 inches away from belly button   - If someone else is giving the injection, they may also use the outer part of your upper arm or your buttocks.   - If injecting 2 pens for the loading dose, use 2 different injection sites.   - Patient was instructed to rotate injections sites monthly.  - Inspect medication - should be clear and colorless to slightly yellow to slightly brown.   - Patient is to wipe down the injection site with the alcohol pad, wait to dry.    - Remove white base cap from pen (twist to remove).  - Place the pen flat against the injection site and turn the lock ring to the unlocked position then push down and hold the teal button - there will be an initial click; in 10 seconds you will hear a second click.  - Remove the pen from skin and check to see if the gray plunger is visible - this will indicate that the injection is complete.   - Patient will use sharps container; once full, per state law, she/he may securely lock the sharps container and place in trash. Pharmacy will replace the  sharps at no additional charge.    Patient was counseled on possible side effects:  - Injection site reaction: redness, soreness, itching, bruising, which should resolve within 3-5 days.  - Allergic reactions: itching, rash, hives, swelling of face, mouth, tongue, throat, trouble breathing.   - Informed that there is no data in pregnancy/breastfeeding.     Consultation included the importance of compliance and of keeping all follow up appointments.  Patient understands to report any medication changes to [facility] and provider. All questions answered and addressed to patients satisfaction. RPh will touchbase with pt in 7 days and [facility] will contact patient in 3 weeks for refills.

## 2019-08-29 RX ORDER — TIZANIDINE 4 MG/1
TABLET ORAL
Qty: 14 TABLET | Refills: 0 | OUTPATIENT
Start: 2019-08-29

## 2019-09-03 ENCOUNTER — TELEPHONE (OUTPATIENT)
Dept: PHARMACY | Facility: CLINIC | Age: 59
End: 2019-09-03

## 2019-09-03 NOTE — TELEPHONE ENCOUNTER
Tried to contact patient concerning referral for medications. Left message for patient to return my

## 2019-09-13 ENCOUNTER — TELEPHONE (OUTPATIENT)
Dept: PHARMACY | Facility: CLINIC | Age: 59
End: 2019-09-13

## 2019-09-13 NOTE — TELEPHONE ENCOUNTER
Call attempt 1 for Emgality refill - LVM. Copay $3.80 at 004.    Rafa Jacob, PharmD  Clinical Pharmacist   [facility]   P: [phone]

## 2019-10-11 DIAGNOSIS — J67.9 HYPERSENSITIVITY PNEUMONITIS: Chronic | ICD-10-CM

## 2019-10-11 RX ORDER — PREDNISONE 20 MG/1
TABLET ORAL
Qty: 60 TABLET | Refills: 1 | Status: SHIPPED | OUTPATIENT
Start: 2019-10-11 | End: 2020-02-03 | Stop reason: SDUPTHER

## 2019-10-11 RX ORDER — CLONIDINE HYDROCHLORIDE 0.1 MG/1
TABLET ORAL
Qty: 30 TABLET | Refills: 0 | Status: SHIPPED | OUTPATIENT
Start: 2019-10-11 | End: 2020-02-17 | Stop reason: SDUPTHER

## 2019-10-11 RX ORDER — PROMETHAZINE HYDROCHLORIDE 25 MG/1
TABLET ORAL
Qty: 45 TABLET | Refills: 0 | Status: SHIPPED | OUTPATIENT
Start: 2019-10-11 | End: 2019-12-17 | Stop reason: SDUPTHER

## 2019-10-14 ENCOUNTER — TELEPHONE (OUTPATIENT)
Dept: PHARMACY | Facility: CLINIC | Age: 59
End: 2019-10-14

## 2019-10-15 ENCOUNTER — TELEPHONE (OUTPATIENT)
Dept: INTERNAL MEDICINE | Facility: CLINIC | Age: 59
End: 2019-10-15

## 2019-10-15 RX ORDER — SUMATRIPTAN SUCCINATE 100 MG/1
TABLET ORAL
Qty: 14 TABLET | Refills: 0 | Status: SHIPPED | OUTPATIENT
Start: 2019-10-15 | End: 2019-12-17 | Stop reason: SDUPTHER

## 2019-10-15 RX ORDER — NARATRIPTAN 2.5 MG/1
TABLET ORAL
Qty: 9 TABLET | Refills: 2 | Status: SHIPPED | OUTPATIENT
Start: 2019-10-15 | End: 2020-02-17 | Stop reason: SDUPTHER

## 2019-10-15 NOTE — TELEPHONE ENCOUNTER
----- Message from Jordi Roach sent at 10/15/2019  1:13 PM CDT -----  Contact: [facility] (Letitia )   .Type:  Pharmacy Calling to Clarify an RX    Name of Caller:Letitia   Pharmacy Name [facility]  Prescription Name: naratriptan (AMERGE) 2.5 MG tablet  What do they need to clarify?: needs to verify instructions   Best Call Back Number: [phone]   Additional Information:             [phone]

## 2019-10-15 NOTE — TELEPHONE ENCOUNTER
Spoke to Letitia at the pharmacy and was advised that the patient insurance is requesting a max dose on naratriptan (AMERGE) 2.5 MG tablet.  Please advise.    Example: take 1 tablet by mouth daily as needed. Up to 2 tablets a day.

## 2019-11-01 ENCOUNTER — TELEPHONE (OUTPATIENT)
Dept: PULMONOLOGY | Facility: CLINIC | Age: 59
End: 2019-11-01

## 2019-11-01 NOTE — TELEPHONE ENCOUNTER
"Initiated prior authorization request with patient's insurance for mycophenolate (CELLCEPT) 250 mg Cap prescription. Submitted online via [email or web address] (request key [id number]). "[facility] is reviewing your PA request and will respond within 24 hours for Medicaid or up to 72 hours for non-Medicaid plans, based on the required timeframe determined by state or federal regulations. To check for an update later, open this request from your dashboard." -- PA case ID [id number]   "

## 2019-11-12 NOTE — TELEPHONE ENCOUNTER
Approval received with date of service 10/2/19 to 10/31/22.     Faxed approval to patient's pharmacy with instructions for pharmacy to notify patient when prescription ready for patient pickup. Fax transmission confirmed.

## 2019-11-14 ENCOUNTER — TELEPHONE (OUTPATIENT)
Dept: PHARMACY | Facility: CLINIC | Age: 59
End: 2019-11-14

## 2019-11-14 NOTE — TELEPHONE ENCOUNTER
Call attempt 1  for Emgality refill and clinical follow up -LVM; Portal; declined. Copay $3.80 at 004.

## 2019-11-15 NOTE — TELEPHONE ENCOUNTER
Refill and followup call for Emgality. Patient confirmed need of the refill. Will deliver via FedEx on  to arrive on  with patient consent. Copay $3.80 at 004 with auth to charge [employer]. Address confirmed. Patient has 0 doses remaining (0 day supply)/ next injection due . Patient denies missed doses and no side effects.  No new medications/allergies/medical conditions. No ER/Urgent care visits in past month. No Sharps container needed. Patient taking the medication as directed. Patient denies any further questions. Confirmed 2 patient identifiers - Name and .    Nadeem Tuttle, PharmD  Clinical Pharmacist  [facility]  P: [phone]

## 2019-12-11 ENCOUNTER — TELEPHONE (OUTPATIENT)
Dept: PHARMACY | Facility: CLINIC | Age: 59
End: 2019-12-11

## 2019-12-13 RX ORDER — CLONIDINE HYDROCHLORIDE 0.1 MG/1
TABLET ORAL
Refills: 0 | OUTPATIENT
Start: 2019-12-13

## 2019-12-13 RX ORDER — PROMETHAZINE HYDROCHLORIDE 25 MG/1
TABLET ORAL
Refills: 0 | OUTPATIENT
Start: 2019-12-13

## 2019-12-18 RX ORDER — PROMETHAZINE HYDROCHLORIDE 25 MG/1
TABLET ORAL
Qty: 20 TABLET | Refills: 0 | Status: SHIPPED | OUTPATIENT
Start: 2019-12-18 | End: 2020-02-17 | Stop reason: SDUPTHER

## 2019-12-18 RX ORDER — SUMATRIPTAN SUCCINATE 100 MG/1
TABLET ORAL
Qty: 9 TABLET | Refills: 0 | Status: SHIPPED | OUTPATIENT
Start: 2019-12-18 | End: 2020-02-17 | Stop reason: SDUPTHER

## 2019-12-18 RX ORDER — SUMATRIPTAN 20 MG/1
SPRAY NASAL
Qty: 1 EACH | Refills: 0 | Status: SHIPPED | OUTPATIENT
Start: 2019-12-18 | End: 2021-03-15

## 2019-12-19 ENCOUNTER — TELEPHONE (OUTPATIENT)
Dept: INTERNAL MEDICINE | Facility: CLINIC | Age: 59
End: 2019-12-19

## 2019-12-19 NOTE — TELEPHONE ENCOUNTER
Spoke with pt and pt states that she is in need of promethazine, sumatriptan and clonidine. Her last visit with Dr. Box was on 12/18/18. Pt will need to be seen first for refill on these medications. Please advise.

## 2019-12-19 NOTE — TELEPHONE ENCOUNTER
RX incoming call from patient regarding Emgality refill to [facility]. Shipping out  for  arrival with patients consent. Copay of $3.80 charging [employer] (2307) @ 173. Patient requested a sharps container-- added flag to order. Address and  confirmed. Patient has 0 doses on hand at this time. Patient has not started any new medications, has had no missed doses and no side effects present. Patient is currently taking the medication as directed by doctors instruction, injecting once every 4 weeks. Patient states they do not have any questions or concerns at this time. Patient will inject once she received on Friday, .

## 2020-01-08 ENCOUNTER — TELEPHONE (OUTPATIENT)
Dept: PHARMACY | Facility: CLINIC | Age: 60
End: 2020-01-08

## 2020-01-29 ENCOUNTER — TELEPHONE (OUTPATIENT)
Dept: PULMONOLOGY | Facility: CLINIC | Age: 60
End: 2020-01-29

## 2020-01-29 DIAGNOSIS — G43.011 INTRACTABLE MIGRAINE WITHOUT AURA AND WITH STATUS MIGRAINOSUS: ICD-10-CM

## 2020-01-29 RX ORDER — TOPIRAMATE 100 MG/1
100 TABLET, FILM COATED ORAL 2 TIMES DAILY
Qty: 180 TABLET | Refills: 3 | Status: SHIPPED | OUTPATIENT
Start: 2020-01-29 | End: 2021-07-02

## 2020-01-29 NOTE — TELEPHONE ENCOUNTER
----- Message from Beverley Mae sent at 1/29/2020  2:00 PM CST -----  Contact: Patient  Patient has a new pharmacy that she uses- [facility], please delete other pharmacies, [facility], and [facility] as a back up. Please call her back at [phone]. Thank you

## 2020-01-31 ENCOUNTER — TELEPHONE (OUTPATIENT)
Dept: PULMONOLOGY | Facility: CLINIC | Age: 60
End: 2020-01-31

## 2020-01-31 NOTE — TELEPHONE ENCOUNTER
----- Message from Stevan Hinojosa sent at 1/31/2020  3:43 PM CST -----  Contact: Self- [phone]  Pt states , [facility] pharmacy is trying to get in touch to office in regards to prescriptions. Pt states she would appreciate it if you all responded. Any concerns please call back at [phone].     [facility] - [phone]    Thank You,   Stevan Hinojosa

## 2020-02-03 ENCOUNTER — TELEPHONE (OUTPATIENT)
Dept: PHARMACY | Facility: CLINIC | Age: 60
End: 2020-02-03

## 2020-02-03 DIAGNOSIS — R05.9 COUGH: Primary | ICD-10-CM

## 2020-02-03 DIAGNOSIS — J67.9 HYPERSENSITIVITY PNEUMONITIS: Chronic | ICD-10-CM

## 2020-02-03 RX ORDER — VERAPAMIL HYDROCHLORIDE 180 MG/1
180 TABLET, FILM COATED, EXTENDED RELEASE ORAL DAILY
Qty: 30 TABLET | Refills: 5 | OUTPATIENT
Start: 2020-02-03

## 2020-02-03 RX ORDER — CLONIDINE HYDROCHLORIDE 0.1 MG/1
0.1 TABLET ORAL DAILY
Qty: 30 TABLET | Refills: 0 | OUTPATIENT
Start: 2020-02-03

## 2020-02-03 RX ORDER — NARATRIPTAN 2.5 MG/1
TABLET ORAL
Qty: 9 TABLET | Refills: 2 | OUTPATIENT
Start: 2020-02-03

## 2020-02-03 RX ORDER — SUMATRIPTAN SUCCINATE 100 MG/1
TABLET ORAL
Qty: 9 TABLET | Refills: 0 | OUTPATIENT
Start: 2020-02-03

## 2020-02-03 RX ORDER — PROMETHAZINE HYDROCHLORIDE 25 MG/1
25 TABLET ORAL EVERY 4 HOURS
Qty: 20 TABLET | Refills: 0 | OUTPATIENT
Start: 2020-02-03

## 2020-02-03 RX ORDER — FLUTICASONE PROPIONATE 50 MCG
2 SPRAY, SUSPENSION (ML) NASAL DAILY
OUTPATIENT
Start: 2020-02-03

## 2020-02-04 RX ORDER — PREDNISONE 20 MG/1
40 TABLET ORAL DAILY
Qty: 60 TABLET | Refills: 1 | Status: SHIPPED | OUTPATIENT
Start: 2020-02-04 | End: 2020-05-13

## 2020-02-04 RX ORDER — BENZONATATE 100 MG/1
CAPSULE ORAL
Qty: 120 CAPSULE | Refills: 3 | Status: SHIPPED | OUTPATIENT
Start: 2020-02-04 | End: 2020-09-10

## 2020-02-17 ENCOUNTER — TELEPHONE (OUTPATIENT)
Dept: PULMONOLOGY | Facility: CLINIC | Age: 60
End: 2020-02-17

## 2020-02-17 DIAGNOSIS — J98.4 CRLD (CHRONIC RESTRICTIVE LUNG DISEASE): Chronic | ICD-10-CM

## 2020-02-17 RX ORDER — NARATRIPTAN 2.5 MG/1
TABLET ORAL
Qty: 9 TABLET | Refills: 2 | Status: SHIPPED | OUTPATIENT
Start: 2020-02-17 | End: 2021-07-09 | Stop reason: SDUPTHER

## 2020-02-17 RX ORDER — VERAPAMIL HYDROCHLORIDE 180 MG/1
180 TABLET, FILM COATED, EXTENDED RELEASE ORAL DAILY
Qty: 30 TABLET | Refills: 5 | Status: SHIPPED | OUTPATIENT
Start: 2020-02-17 | End: 2021-03-30 | Stop reason: SDUPTHER

## 2020-02-17 RX ORDER — SUMATRIPTAN SUCCINATE 100 MG/1
TABLET ORAL
Qty: 9 TABLET | Refills: 0 | Status: SHIPPED | OUTPATIENT
Start: 2020-02-17 | End: 2020-04-05

## 2020-02-17 RX ORDER — CLONIDINE HYDROCHLORIDE 0.1 MG/1
0.1 TABLET ORAL DAILY
Qty: 30 TABLET | Refills: 0 | Status: SHIPPED | OUTPATIENT
Start: 2020-02-17 | End: 2020-04-05

## 2020-02-17 RX ORDER — PROMETHAZINE HYDROCHLORIDE 25 MG/1
25 TABLET ORAL EVERY 4 HOURS
Qty: 20 TABLET | Refills: 0 | Status: SHIPPED | OUTPATIENT
Start: 2020-02-17 | End: 2020-04-05

## 2020-02-21 ENCOUNTER — TELEPHONE (OUTPATIENT)
Dept: INTERNAL MEDICINE | Facility: CLINIC | Age: 60
End: 2020-02-21

## 2020-02-21 NOTE — TELEPHONE ENCOUNTER
----- Message from Shona Irvin sent at 2/21/2020  9:11 AM CST -----  Contact: Alek/Deanna  Type:  Pharmacy Calling to Clarify an RX    Name of Caller:Phil /Deanna  Pharmacy Name:[facility]  Prescription Name:Simatriplan 100mg and Naratriptan 25mg  What do they need to clarify? Clarify therapy  Best Call Back Number:[phone]/fax [phone]  Additional Information: states fax was sent over yesterday

## 2020-02-27 ENCOUNTER — TELEPHONE (OUTPATIENT)
Dept: PHARMACY | Facility: CLINIC | Age: 60
End: 2020-02-27

## 2020-04-02 ENCOUNTER — TELEPHONE (OUTPATIENT)
Dept: PHARMACY | Facility: CLINIC | Age: 60
End: 2020-04-02

## 2020-04-03 ENCOUNTER — TELEPHONE (OUTPATIENT)
Dept: PHARMACY | Facility: CLINIC | Age: 60
End: 2020-04-03

## 2020-04-03 NOTE — TELEPHONE ENCOUNTER
DOCUMENTATION ONLY:  Prior authorization for Emgality approved from 04/02/20 to 12/31/20    Co-pay: $99    Patient Assistance IS required

## 2020-04-05 RX ORDER — CLONIDINE HYDROCHLORIDE 0.1 MG/1
TABLET ORAL
Qty: 30 TABLET | Refills: 0 | Status: SHIPPED | OUTPATIENT
Start: 2020-04-05 | End: 2020-05-13

## 2020-04-05 RX ORDER — PROMETHAZINE HYDROCHLORIDE 25 MG/1
TABLET ORAL
Qty: 20 TABLET | Refills: 0 | Status: SHIPPED | OUTPATIENT
Start: 2020-04-05 | End: 2020-05-13

## 2020-04-05 RX ORDER — SUMATRIPTAN SUCCINATE 100 MG/1
TABLET ORAL
Qty: 9 TABLET | Refills: 0 | Status: SHIPPED | OUTPATIENT
Start: 2020-04-05 | End: 2020-08-15 | Stop reason: SDUPTHER

## 2020-05-04 ENCOUNTER — TELEPHONE (OUTPATIENT)
Dept: PHARMACY | Facility: CLINIC | Age: 60
End: 2020-05-04

## 2020-05-12 DIAGNOSIS — J67.9 HYPERSENSITIVITY PNEUMONITIS: Chronic | ICD-10-CM

## 2020-05-13 RX ORDER — CLONIDINE HYDROCHLORIDE 0.1 MG/1
TABLET ORAL
Qty: 30 TABLET | Refills: 0 | Status: SHIPPED | OUTPATIENT
Start: 2020-05-13 | End: 2020-08-15 | Stop reason: SDUPTHER

## 2020-05-13 RX ORDER — PREDNISONE 20 MG/1
TABLET ORAL
Qty: 120 TABLET | Refills: 6 | Status: SHIPPED | OUTPATIENT
Start: 2020-05-13 | End: 2021-03-12 | Stop reason: SDUPTHER

## 2020-05-13 RX ORDER — PROMETHAZINE HYDROCHLORIDE 25 MG/1
TABLET ORAL
Qty: 20 TABLET | Refills: 0 | Status: SHIPPED | OUTPATIENT
Start: 2020-05-13 | End: 2020-08-15 | Stop reason: SDUPTHER

## 2020-06-08 ENCOUNTER — TELEPHONE (OUTPATIENT)
Dept: PHARMACY | Facility: CLINIC | Age: 60
End: 2020-06-08

## 2020-07-22 ENCOUNTER — TELEPHONE (OUTPATIENT)
Dept: PHARMACY | Facility: CLINIC | Age: 60
End: 2020-07-22

## 2020-08-04 ENCOUNTER — TELEPHONE (OUTPATIENT)
Dept: PHARMACY | Facility: CLINIC | Age: 60
End: 2020-08-04

## 2020-08-27 RX ORDER — GALCANEZUMAB 120 MG/ML
120 INJECTION, SOLUTION SUBCUTANEOUS
Qty: 3 ML | Refills: 11 | Status: SHIPPED | OUTPATIENT
Start: 2020-08-27 | End: 2020-11-02 | Stop reason: SDUPTHER

## 2020-09-10 NOTE — TELEPHONE ENCOUNTER
Refill call for Emgality. Patient confirmed need of the refill. Will deliver via [employer] on 9/10 to arrive on  with patient consent. Copay $158.57 at 004. [employer]. Address confirmed. Patient has 0 doses remaining (0 day supply)/ next injection due . Patient denies missed doses and no side effects.  No new medications/allergies/medical conditions. No ER/Urgent care visits in past month. Sharps container needed. Patient taking the medication as directed. Patient denies any further questions. Confirmed 2 patient identifiers - Name and .    Nadeem Tuttle, PharmD  Clinical Pharmacist  [facility]  P: [phone]

## 2020-10-05 ENCOUNTER — TELEPHONE (OUTPATIENT)
Dept: PHARMACY | Facility: CLINIC | Age: 60
End: 2020-10-05

## 2020-10-15 ENCOUNTER — TELEPHONE (OUTPATIENT)
Dept: NEUROLOGY | Facility: CLINIC | Age: 60
End: 2020-10-15

## 2020-10-15 NOTE — TELEPHONE ENCOUNTER
----- Message from Mercy Garcia sent at 10/15/2020  8:36 AM CDT -----  Contact: [facility]  Please provide fax number to [facility] the one on file isn't working.  [phone]

## 2020-11-02 ENCOUNTER — SPECIALTY PHARMACY (OUTPATIENT)
Dept: PHARMACY | Facility: CLINIC | Age: 60
End: 2020-11-02

## 2020-11-02 ENCOUNTER — OFFICE VISIT (OUTPATIENT)
Dept: PULMONOLOGY | Facility: CLINIC | Age: 60
End: 2020-11-02
Payer: MEDICARE

## 2020-11-02 VITALS
WEIGHT: 251.56 LBS | BODY MASS INDEX: 42.95 KG/M2 | HEIGHT: 64 IN | DIASTOLIC BLOOD PRESSURE: 80 MMHG | OXYGEN SATURATION: 99 % | SYSTOLIC BLOOD PRESSURE: 124 MMHG | RESPIRATION RATE: 16 BRPM | HEART RATE: 84 BPM

## 2020-11-02 DIAGNOSIS — J67.9 HYPERSENSITIVITY PNEUMONITIS: Primary | Chronic | ICD-10-CM

## 2020-11-02 DIAGNOSIS — J96.11 CHRONIC RESPIRATORY FAILURE WITH HYPOXIA: Chronic | ICD-10-CM

## 2020-11-02 DIAGNOSIS — J98.4 CRLD (CHRONIC RESTRICTIVE LUNG DISEASE): Chronic | ICD-10-CM

## 2020-11-02 DIAGNOSIS — E66.01 CLASS 3 SEVERE OBESITY DUE TO EXCESS CALORIES WITHOUT SERIOUS COMORBIDITY WITH BODY MASS INDEX (BMI) OF 40.0 TO 44.9 IN ADULT: Chronic | ICD-10-CM

## 2020-11-02 DIAGNOSIS — G43.011 INTRACTABLE MIGRAINE WITHOUT AURA AND WITH STATUS MIGRAINOSUS: Primary | ICD-10-CM

## 2020-11-02 PROCEDURE — 90694 VACC AIIV4 NO PRSRV 0.5ML IM: CPT | Mod: HCNC,S$GLB,, | Performed by: INTERNAL MEDICINE

## 2020-11-02 PROCEDURE — 99214 PR OFFICE/OUTPT VISIT, EST, LEVL IV, 30-39 MIN: ICD-10-PCS | Mod: 25,HCNC,S$GLB, | Performed by: INTERNAL MEDICINE

## 2020-11-02 PROCEDURE — 90732 PPSV23 VACC 2 YRS+ SUBQ/IM: CPT | Mod: HCNC,S$GLB,, | Performed by: INTERNAL MEDICINE

## 2020-11-02 PROCEDURE — 3008F PR BODY MASS INDEX (BMI) DOCUMENTED: ICD-10-PCS | Mod: HCNC,CPTII,S$GLB, | Performed by: INTERNAL MEDICINE

## 2020-11-02 PROCEDURE — G0008 FLU VACCINE - QUADRIVALENT - ADJUVANTED: ICD-10-PCS | Mod: HCNC,S$GLB,, | Performed by: INTERNAL MEDICINE

## 2020-11-02 PROCEDURE — G0008 ADMIN INFLUENZA VIRUS VAC: HCPCS | Mod: HCNC,S$GLB,, | Performed by: INTERNAL MEDICINE

## 2020-11-02 PROCEDURE — 99999 PR PBB SHADOW E&M-EST. PATIENT-LVL V: ICD-10-PCS | Mod: PBBFAC,HCNC,, | Performed by: INTERNAL MEDICINE

## 2020-11-02 PROCEDURE — G0009 PNEUMOCOCCAL POLYSACCHARIDE VACCINE 23-VALENT =>2YO SQ IM: ICD-10-PCS | Mod: HCNC,S$GLB,, | Performed by: INTERNAL MEDICINE

## 2020-11-02 PROCEDURE — 3008F BODY MASS INDEX DOCD: CPT | Mod: HCNC,CPTII,S$GLB, | Performed by: INTERNAL MEDICINE

## 2020-11-02 PROCEDURE — 99499 UNLISTED E&M SERVICE: CPT | Mod: S$GLB,,, | Performed by: INTERNAL MEDICINE

## 2020-11-02 PROCEDURE — 99999 PR PBB SHADOW E&M-EST. PATIENT-LVL V: CPT | Mod: PBBFAC,HCNC,, | Performed by: INTERNAL MEDICINE

## 2020-11-02 PROCEDURE — 90694 FLU VACCINE - QUADRIVALENT - ADJUVANTED: ICD-10-PCS | Mod: HCNC,S$GLB,, | Performed by: INTERNAL MEDICINE

## 2020-11-02 PROCEDURE — 90732 PNEUMOCOCCAL POLYSACCHARIDE VACCINE 23-VALENT =>2YO SQ IM: ICD-10-PCS | Mod: HCNC,S$GLB,, | Performed by: INTERNAL MEDICINE

## 2020-11-02 PROCEDURE — G0009 ADMIN PNEUMOCOCCAL VACCINE: HCPCS | Mod: HCNC,S$GLB,, | Performed by: INTERNAL MEDICINE

## 2020-11-02 PROCEDURE — 99214 OFFICE O/P EST MOD 30 MIN: CPT | Mod: 25,HCNC,S$GLB, | Performed by: INTERNAL MEDICINE

## 2020-11-02 PROCEDURE — 99499 RISK ADDL DX/OHS AUDIT: ICD-10-PCS | Mod: S$GLB,,, | Performed by: INTERNAL MEDICINE

## 2020-11-02 RX ORDER — FLUTICASONE FUROATE AND VILANTEROL TRIFENATATE 100; 25 UG/1; UG/1
1 POWDER RESPIRATORY (INHALATION) DAILY
Qty: 30 EACH | Refills: 11 | Status: SHIPPED | OUTPATIENT
Start: 2020-11-02 | End: 2020-12-04 | Stop reason: SDUPTHER

## 2020-11-02 RX ORDER — GALCANEZUMAB 120 MG/ML
120 INJECTION, SOLUTION SUBCUTANEOUS
Qty: 3 ML | Refills: 11 | Status: SHIPPED | OUTPATIENT
Start: 2020-11-02 | End: 2020-11-09 | Stop reason: SDUPTHER

## 2020-11-02 NOTE — PATIENT INSTRUCTIONS
Chronic Lung Disease: If Oxygen Is Prescribed   Supplemental oxygen is prescribed if tests show that the level of oxygen in your blood is too low. If the level stays too low for too long, serious problems can develop in many parts of the body. Supplemental oxygen helps to relieve your symptoms and prevent future problems by getting more oxygen to the blood. Depending on your test results, you may need oxygen all the time. Or it may only be needed during certain activities, such as exercise or sleep. When oxygen is prescribed, youll be referred to a medical equipment company. They will set up the oxygen unit and teach you how to use it.  Nasal cannula     A nasal cannula should be placed in the nose with the prongs arching toward you.     Oxygen is most often inhaled through a nasal cannula (lightweight tube with two hollow prongs that fit just inside the nose).  Types of supplemental oxygen    Compressed oxygen   Oxygen concentrator   Liquid oxygen   Prescribed oxygen comes in several forms. You may use more than one type, depending on when you need oxygen:  · Compressed oxygen is oxygen gas stored in a tank. Because the oxygen is stored under pressure, these tanks must be handled carefully. Gauges on the tank can be used to adjust the oxygen flow rate. Your healthcare provider will determine what this should be. Small tanks can be carried. Larger tanks are on wheels and can be pulled around the house.  · An oxygen concentrator is a machine about the size of a large suitcase. It plugs into an electrical outlet. (A back-up oxygen supply is recommended in case of a power outage.) The machine takes oxygen from the air and concentrates it. Its then delivered to you through plastic tubing. The tubing is long enough so that you can move around the house. When youre using the concentrator, it must be kept somewhere that has a good supply of fresh air. (Dont keep it in a confined space, like a closet.) You may be set  up on a concentrator if you need oxygen all the time or while youre sleeping.  · Liquid oxygen results when oxygen gas is cooled to a very low temperature. Its kept in special containers that maintain this low temperature. When you use liquid oxygen, its warmed and becomes gas before reaching the cannula. Most tanks come with a portable unit that you can carry or pull on a cart. Some of these weigh only a few pounds. Liquid oxygen units are easy to carry around. If you need oxygen all the time or during activity, this kind of unit can help you stay active.  Oxygen is prescribed just for you  Your healthcare provider will prescribe oxygen based on your needs. Here are a few things you should know:  · A therapist from the medical equipment company will explain when to use oxygen and what type to use. Youll be taught how to use and maintain your oxygen equipment.  · You must use the exact rate of oxygen prescribed for each activity. Dont increase or decrease the amount without asking your healthcare provider first.  · Supplemental oxygen is a medicine. Its not addictive and causes no side effects when used as directed.   Date Last Reviewed: 5/1/2016  © 6225-2599 [employer]. [address] [place] [address], [place] [address]. All rights reserved. This information is not intended as a substitute for professional medical care. Always follow your healthcare professional's instructions.

## 2020-11-02 NOTE — ASSESSMENT & PLAN NOTE
Continue oxygen supplementation at 4 - 6  l /min with exertion and 4 l/ min with sleep.   6MWT in 3 months

## 2020-11-02 NOTE — PROGRESS NOTES
Subjective:      Established patient    Patient ID: Serene Rod is a 60 y.o. female.  Patient Active Problem List   Diagnosis    Hyperglycemia    Eczema    Hypertension, well controlled    Hx of migraine headaches    Hyperlipidemia    Chronic Hypersensitivity Pneumonitis    Class 3 severe obesity due to excess calories without serious comorbidity with body mass index (BMI) of 40.0 to 44.9 in adult    Chronic respiratory failure with hypoxia    CRLD (chronic restrictive lung disease)    Intractable migraine without aura and with status migrainosus       Problem list has been reviewed.    Chief Complaint: Chronic Hypersentitivity Pneumonitis, CRLD, and Chronic Respiratory Failure      HPI      CHP:     Patients reports progressive dyspnea NYHA III dyspnea    The patient does not have currently have symptoms / an exacerbation.      Her cough has significantly improved.     She is on continous oxygen supplementation @ 4 - 6 L /min .     Reports recent improvement in breathing.     She reports that she could not afford CELLCEPT & TRELEGY    She is currently on prednisone 40 mg PO daily    She is not currently on any inhalers.     A full  review of systems, past , family  and social histories was performed except as mentioned in the note above, these are non contributory to the main issues discussed today.       Previous Report Reviewed: lab reports, office notes and radiology reports     The following portions of the patient's history were reviewed and updated as appropriate: She  has a past medical history of Allergy and Migraines.  She  has a past surgical history that includes neurostimulator and Lung biopsy.  Her family history includes Alcohol abuse in her brother; Emphysema in her father; Heart attack in her maternal grandfather; Heart disease in her brother, father, and mother; Lung cancer in her mother; Stroke in her father and mother.  She  reports that she has never smoked. She has never used  "smokeless tobacco. She reports current alcohol use. No history on file for drug.  She has a current medication list which includes the following prescription(s): benzonatate, bupropion hcl, clonidine, escitalopram oxalate, fluticasone propionate, emgality pen, naratriptan, prednisone, promethazine, sumatriptan, sumatriptan, topiramate, triamcinolone acetonide 0.1%, verapamil, breo ellipta, mycophenolate, clonidine, naratriptan, promethazine, sumatriptan, and verapamil.  She is allergic to cat/feline products and grass pollen-june grass standard..    Review of Systems   Constitutional: Positive for night sweats. Negative for chills and fatigue.   HENT: Positive for postnasal drip, rhinorrhea, voice change and congestion.    Eyes: Negative for itching.   Respiratory: Positive for apnea, snoring, shortness of breath, wheezing and dyspnea on extertion. Negative for hemoptysis and somnolence.    Cardiovascular: Positive for leg swelling. Negative for chest pain and palpitations.   Genitourinary: Negative for difficulty urinating and hematuria.   Endocrine: Negative for cold intolerance and heat intolerance.    Musculoskeletal: Positive for back pain.   Skin: Negative for rash.   Gastrointestinal: Positive for nausea and acid reflux. Negative for vomiting, abdominal pain and abdominal distention.   Neurological: Positive for dizziness and headaches.   Hematological: Excessive bruising.   Psychiatric/Behavioral: The patient is nervous/anxious.    All other systems reviewed and are negative.       Objective:     /80   Pulse 84   Resp 16   Ht 5' 4" (1.626 m)   Wt 114.1 kg (251 lb 8.7 oz)   SpO2 99% Comment: 4 liters  BMI 43.18 kg/m²   Body mass index is 43.18 kg/m².     Physical Exam  Vitals signs and nursing note reviewed.   Constitutional:       Appearance: She is well-developed.      Comments: Morbidly obese   HENT:      Head: Normocephalic and atraumatic.   Eyes:      Pupils: Pupils are equal, round, and " reactive to light.   Neck:      Musculoskeletal: Normal range of motion and neck supple.      Comments: 14.5  Cardiovascular:      Rate and Rhythm: Normal rate and regular rhythm.   Pulmonary:      Effort: Pulmonary effort is normal.      Breath sounds: Normal breath sounds.   Abdominal:      General: Bowel sounds are normal.      Palpations: Abdomen is soft.   Musculoskeletal: Normal range of motion.   Skin:     General: Skin is warm and dry.      Capillary Refill: Capillary refill takes less than 2 seconds.   Neurological:      Mental Status: She is alert and oriented to person, place, and time.         Personal Diagnostic Review      Six minute walk TEST: 02/07/19:   Six minute walk distance is 289.56 / 408.86 meters (70.82 % predicted) with very, very heavy dyspnea. Peak VO2 during walking was 12.67 ml/min/kg [ 3.62 METS]. During exercise, there was significant desaturation while breathing room air to 86%. Oxygen saturation improved to 95% with oxygen supplementation at 6 L /min. Blood pressure remained stable and Heart rate increased significantly with walking.  Hypertension was present prior to exercise.  This may represent a bnormal cardiovascular response to exercise.  The patient did not report non-pulmonary symptoms during exercise.  The patient did complete the study, walking 360 seconds of the 360 second test.  The patient may benefit from using supplemental oxygen.  Since the previous study in 02/07/19, exercise capacity is significantly improved.  Based upon age and body mass index, exercise capacity is normal.    CT Chest With Contrast: 02/01/19:  1. There is a moderate amount of haziness scattered throughout both lungs.  This is consistent with the patient's history.  2. There is partial visualization of a 7.4 cm hypodense mass off of the midpole of the left kidney. This mass has a Hounsfield measurement of 7.  This is characteristic of a cyst.  3. A generator is located in the subcutaneous fat  posterior to the right side of the abdomen. Its electrodes are visualized in the subcutaneous fat posterior to the right side of the neck, chest, and abdomen.      Assessment / Plan:       Discussed diagnosis, its evaluation, treatment and usual course. All questions answered.    Problem List Items Addressed This Visit        Pulmonary    CRLD (chronic restrictive lung disease) (Chronic)    Current Assessment & Plan     CRLD ROS: SECONDARY TO CHP.  taking medications as instructed, no medication side effects noted, no significant ongoing wheezing or shortness of breath, using bronchodilator MDI less than twice a week.   New concerns: progressive dyspnea and cough   Exam: Bilateral Rales.   Assessment:  CRLD stable.   Plan: PFT in 3 months. Orders per EMR.          Relevant Orders    Complete PFT without bronchodilator    Chronic Hypersensitivity Pneumonitis - Primary    Current Assessment & Plan     CHP ROS: taking medications as instructed, no medication side effects noted, no significant ongoing wheezing or shortness of breath, using bronchodilator MDI less than twice a week.  INTOLERANT OF IMURAN.  New concerns: Progressive dyspnea and cough.   Exam: chest rales noted bilaterally.   Assessment:  CHP stable.   Plan: Decrease PREDNISONE 30 MG PO DAILY. START BREO PER ORDERS. CT chest in 3 months.    Immunization:    [x]        Pneumococcal Polysaccharide  Vaccine (23 Valent) (IM)  []        Pneumococcal Conjugate Vaccine (13 Valent) (IM)  [x]        Influenza - High Dose (65+) (PF) (IM)           Relevant Medications    fluticasone furoate-vilanteroL (BREO ELLIPTA) 100-25 mcg/dose diskus inhaler    Other Relevant Orders    CT Chest Without Contrast    Pneumococcal polysaccharide vaccine 23-valent greater than or equal to 1yo subcutaneous/IM    Chronic respiratory failure with hypoxia    Current Assessment & Plan     Continue oxygen supplementation at 4 - 6  l /min with exertion and 4 l/ min with sleep.   6MWT in 3  months         Relevant Orders    Pulmonary stress test       Other    Class 3 severe obesity due to excess calories without serious comorbidity with body mass index (BMI) of 40.0 to 44.9 in adult    Current Assessment & Plan     General weight loss/lifestyle modification strategies discussed (elicit support from others; identify saboteurs; non-food rewards, etc).  Diet interventions: low calorie (1000 kCal/d) deficit diet.  Informal exercise measures discussed, e.g. taking stairs instead of elevator.                 TIME SPENT WITH PATIENT: Time spent: 40 minutes in face to face  discussion concerning diagnosis, prognosis, review of lab and test results, benefits of treatment as well as management of disease, counseling of patient and coordination of care between various health  care providers . Greater than half the time spent was used for coordination of care and counseling of patient.       Follow up for Chronic Respiratory Failure, Obesity, Restrictive Lung Disease, CHP.

## 2020-11-02 NOTE — ASSESSMENT & PLAN NOTE
CRLD ROS: SECONDARY TO CHP.  taking medications as instructed, no medication side effects noted, no significant ongoing wheezing or shortness of breath, using bronchodilator MDI less than twice a week.   New concerns: progressive dyspnea and cough   Exam: Bilateral Rales.   Assessment:  CRLD stable.   Plan: PFT in 3 months. Orders per EMR.

## 2020-11-02 NOTE — ASSESSMENT & PLAN NOTE
CHP ROS: taking medications as instructed, no medication side effects noted, no significant ongoing wheezing or shortness of breath, using bronchodilator MDI less than twice a week.  INTOLERANT OF IMURAN.  New concerns: Progressive dyspnea and cough.   Exam: chest rales noted bilaterally.   Assessment:  CHP stable.   Plan: Decrease PREDNISONE 30 MG PO DAILY. START BREO PER ORDERS. CT chest in 3 months.    Immunization:    [x]        Pneumococcal Polysaccharide  Vaccine (23 Valent) (IM)  []        Pneumococcal Conjugate Vaccine (13 Valent) (IM)  [x]        Influenza - High Dose (65+) (PF) (IM)

## 2020-11-03 NOTE — TELEPHONE ENCOUNTER
Robb Carballo and staff,    STEPHANIE: Emgality assistance approved by [employer] [facility] until 12/31/20.    The patient has been notified of their approval, and informed on how schedule a delivery of the medication. Please follow up with the  for all future refills through the patient's enrollment.      Thanks,  Livier Herrera CPhT.  OSP [phone]

## 2020-11-09 DIAGNOSIS — G43.011 INTRACTABLE MIGRAINE WITHOUT AURA AND WITH STATUS MIGRAINOSUS: ICD-10-CM

## 2020-11-09 RX ORDER — GALCANEZUMAB 120 MG/ML
120 INJECTION, SOLUTION SUBCUTANEOUS
Qty: 3 ML | Refills: 11 | Status: SHIPPED | OUTPATIENT
Start: 2020-11-09 | End: 2020-11-09 | Stop reason: SDUPTHER

## 2020-11-09 RX ORDER — GALCANEZUMAB 120 MG/ML
120 INJECTION, SOLUTION SUBCUTANEOUS
Qty: 3 ML | Refills: 11 | Status: SHIPPED | OUTPATIENT
Start: 2020-11-09

## 2020-11-09 NOTE — TELEPHONE ENCOUNTER
----- Message from Luz Cavazos sent at 11/9/2020  1:21 PM CST -----  Regarding: send new Rx  Pt stated pharmacy needs a new Rx for Emgality call in Rx verbally [phone]  ...  call back : [phone] (home)

## 2020-11-09 NOTE — TELEPHONE ENCOUNTER
Please Advise   Medication was  Approved by [employer] but need to be [facility] . Please Advise

## 2020-11-10 ENCOUNTER — TELEPHONE (OUTPATIENT)
Dept: NEUROLOGY | Facility: CLINIC | Age: 60
End: 2020-11-10

## 2020-11-10 NOTE — TELEPHONE ENCOUNTER
----- Message from Shiela Bojorquez sent at 11/10/2020  2:03 PM CST -----  Type:  Patient Returning Call    Who Called: RYLEY LEBRON [[id number]]    Who Left Message for Patient: Trish    Does the patient know what this is regarding?: yes     Best Call Back Number: [phone] (home)      Additional Information:

## 2020-11-11 ENCOUNTER — TELEPHONE (OUTPATIENT)
Dept: NEUROLOGY | Facility: CLINIC | Age: 60
End: 2020-11-11

## 2020-11-11 NOTE — TELEPHONE ENCOUNTER
----- Message from Pranay Enciso sent at 11/11/2020 10:28 AM CST -----  Contact: Serene Chase is requesting a call back at in regards to [facility] phone number [phone]. Pt stated that is the direct number to speak with someone. Please call her back at  [phone].    Thanks  DD

## 2020-12-04 DIAGNOSIS — J67.9 HYPERSENSITIVITY PNEUMONITIS: Chronic | ICD-10-CM

## 2020-12-04 RX ORDER — FLUTICASONE FUROATE AND VILANTEROL TRIFENATATE 100; 25 UG/1; UG/1
1 POWDER RESPIRATORY (INHALATION) DAILY
Qty: 30 EACH | Refills: 11 | Status: SHIPPED | OUTPATIENT
Start: 2020-12-04 | End: 2020-12-09 | Stop reason: SDUPTHER

## 2020-12-09 DIAGNOSIS — J67.9 HYPERSENSITIVITY PNEUMONITIS: Chronic | ICD-10-CM

## 2020-12-09 RX ORDER — FLUTICASONE FUROATE AND VILANTEROL TRIFENATATE 100; 25 UG/1; UG/1
1 POWDER RESPIRATORY (INHALATION) DAILY
Qty: 30 EACH | Refills: 11 | Status: SHIPPED | OUTPATIENT
Start: 2020-12-09 | End: 2020-12-15 | Stop reason: SDUPTHER

## 2020-12-15 DIAGNOSIS — J67.9 HYPERSENSITIVITY PNEUMONITIS: Chronic | ICD-10-CM

## 2020-12-15 RX ORDER — FLUTICASONE FUROATE AND VILANTEROL TRIFENATATE 100; 25 UG/1; UG/1
1 POWDER RESPIRATORY (INHALATION) DAILY
Qty: 30 EACH | Refills: 11 | Status: SHIPPED | OUTPATIENT
Start: 2020-12-15 | End: 2022-01-18 | Stop reason: SDUPTHER

## 2020-12-16 ENCOUNTER — PES CALL (OUTPATIENT)
Dept: ADMINISTRATIVE | Facility: CLINIC | Age: 60
End: 2020-12-16

## 2021-02-04 ENCOUNTER — TELEPHONE (OUTPATIENT)
Dept: PULMONOLOGY | Facility: CLINIC | Age: 61
End: 2021-02-04

## 2021-03-09 ENCOUNTER — LAB VISIT (OUTPATIENT)
Dept: OTOLARYNGOLOGY | Facility: CLINIC | Age: 61
End: 2021-03-09
Payer: MEDICARE

## 2021-03-09 DIAGNOSIS — Z01.818 PRE-OP TESTING: ICD-10-CM

## 2021-03-09 PROCEDURE — U0003 INFECTIOUS AGENT DETECTION BY NUCLEIC ACID (DNA OR RNA); SEVERE ACUTE RESPIRATORY SYNDROME CORONAVIRUS 2 (SARS-COV-2) (CORONAVIRUS DISEASE [COVID-19]), AMPLIFIED PROBE TECHNIQUE, MAKING USE OF HIGH THROUGHPUT TECHNOLOGIES AS DESCRIBED BY CMS-2020-01-R: HCPCS | Performed by: ORTHOPAEDIC SURGERY

## 2021-03-09 PROCEDURE — U0005 INFEC AGEN DETEC AMPLI PROBE: HCPCS | Performed by: ORTHOPAEDIC SURGERY

## 2021-03-10 LAB — SARS-COV-2 RNA RESP QL NAA+PROBE: NOT DETECTED

## 2021-03-12 ENCOUNTER — OFFICE VISIT (OUTPATIENT)
Dept: PULMONOLOGY | Facility: CLINIC | Age: 61
End: 2021-03-12
Payer: MEDICARE

## 2021-03-12 ENCOUNTER — CLINICAL SUPPORT (OUTPATIENT)
Dept: PULMONOLOGY | Facility: CLINIC | Age: 61
End: 2021-03-12
Payer: MEDICARE

## 2021-03-12 ENCOUNTER — HOSPITAL ENCOUNTER (OUTPATIENT)
Dept: RADIOLOGY | Facility: HOSPITAL | Age: 61
Discharge: HOME | End: 2021-03-12
Attending: INTERNAL MEDICINE
Payer: MEDICARE

## 2021-03-12 VITALS — BODY MASS INDEX: 45.35 KG/M2 | HEIGHT: 64 IN | WEIGHT: 265.63 LBS

## 2021-03-12 VITALS
BODY MASS INDEX: 45.24 KG/M2 | DIASTOLIC BLOOD PRESSURE: 83 MMHG | RESPIRATION RATE: 18 BRPM | TEMPERATURE: 97 F | HEART RATE: 70 BPM | HEIGHT: 64 IN | OXYGEN SATURATION: 94 % | SYSTOLIC BLOOD PRESSURE: 121 MMHG | WEIGHT: 265 LBS

## 2021-03-12 DIAGNOSIS — Z79.52 CURRENT CHRONIC USE OF SYSTEMIC STEROIDS: ICD-10-CM

## 2021-03-12 DIAGNOSIS — R05.9 COUGH: ICD-10-CM

## 2021-03-12 DIAGNOSIS — J98.4 CRLD (CHRONIC RESTRICTIVE LUNG DISEASE): Chronic | ICD-10-CM

## 2021-03-12 DIAGNOSIS — J67.9 HYPERSENSITIVITY PNEUMONITIS: ICD-10-CM

## 2021-03-12 DIAGNOSIS — J67.9 HYPERSENSITIVITY PNEUMONITIS: Primary | Chronic | ICD-10-CM

## 2021-03-12 DIAGNOSIS — J96.11 CHRONIC RESPIRATORY FAILURE WITH HYPOXIA: Chronic | ICD-10-CM

## 2021-03-12 DIAGNOSIS — J67.9 HYPERSENSITIVITY PNEUMONITIS: Chronic | ICD-10-CM

## 2021-03-12 DIAGNOSIS — J96.11 CHRONIC RESPIRATORY FAILURE WITH HYPOXIA: ICD-10-CM

## 2021-03-12 DIAGNOSIS — J98.4 RESTRICTIVE LUNG DISEASE: ICD-10-CM

## 2021-03-12 LAB
BRPFT: NORMAL
DLCO ADJ PRE: 11.41 ML/(MIN*MMHG)
DLCO SINGLE BREATH LLN: 17.12
DLCO SINGLE BREATH PRE REF: 49.9 %
DLCO SINGLE BREATH REF: 22.85
DLCOC SBVA LLN: 3.13
DLCOC SBVA PRE REF: 75.6 %
DLCOC SBVA REF: 4.6
DLCOC SINGLE BREATH LLN: 17.12
DLCOC SINGLE BREATH PRE REF: 49.9 %
DLCOC SINGLE BREATH REF: 22.85
DLCOVA LLN: 3.13
DLCOVA PRE REF: 75.6 %
DLCOVA PRE: 3.48 ML/(MIN*MMHG*L)
DLCOVA REF: 4.6
DLVAADJ PRE: 3.48 ML/(MIN*MMHG*L)
ERV LLN: -16449.2
ERV PRE REF: 48.3 %
ERV REF: 0.8
FEF 25 75 LLN: 1.15
FEF 25 75 PRE REF: 76.2 %
FEF 25 75 REF: 2.25
FEV1 FVC LLN: 67
FEV1 FVC PRE REF: 97.9 %
FEV1 FVC REF: 79
FEV1 LLN: 1.88
FEV1 PRE REF: 73.3 %
FEV1 REF: 2.49
FRCPLETH LLN: 1.89
FRCPLETH PREREF: 63.4 %
FRCPLETH REF: 2.71
FVC LLN: 2.4
FVC PRE REF: 74.4 %
FVC REF: 3.17
IVC PRE: 2.4 L
IVC SINGLE BREATH LLN: 2.4
IVC SINGLE BREATH PRE REF: 75.9 %
IVC SINGLE BREATH REF: 3.17
MVV LLN: 80
MVV PRE REF: 74.8 %
MVV REF: 94
PEF LLN: 4.6
PEF PRE REF: 82.9 %
PEF REF: 6.32
PRE DLCO: 11.41 ML/(MIN*MMHG)
PRE ERV: 0.39 L
PRE FEF 25 75: 1.72 L/S
PRE FET 100: 9.54 SEC
PRE FEV1 FVC: 77.58 %
PRE FEV1: 1.83 L
PRE FRC PL: 1.72 L
PRE FVC: 2.35 L
PRE MVV: 70 L/MIN
PRE PEF: 5.24 L/S
PRE RV: 1.32 L
PRE TLC: 3.85 L
RAW LLN: 3.06
RAW PRE REF: 84.1 %
RAW PRE: 2.57 CMH2O*S/L
RAW REF: 3.06
RV LLN: 1.33
RV PRE REF: 69 %
RV REF: 1.91
RVTLC LLN: 30
RVTLC PRE REF: 87.1 %
RVTLC PRE: 34.27 %
RVTLC REF: 39
TLC LLN: 3.98
TLC PRE REF: 77.5 %
TLC REF: 4.97
VA PRE: 3.28 L
VA SINGLE BREATH LLN: 4.82
VA SINGLE BREATH PRE REF: 68 %
VA SINGLE BREATH REF: 4.82
VC LLN: 2.4
VC PRE REF: 79.9 %
VC PRE: 2.53 L
VC REF: 3.17
VTGRAWPRE: 2.18 L

## 2021-03-12 PROCEDURE — 94726 PULM FUNCT TST PLETHYSMOGRAP: ICD-10-PCS | Mod: S$GLB,,, | Performed by: INTERNAL MEDICINE

## 2021-03-12 PROCEDURE — 94726 PLETHYSMOGRAPHY LUNG VOLUMES: CPT | Mod: S$GLB,,, | Performed by: INTERNAL MEDICINE

## 2021-03-12 PROCEDURE — 99999 PR PBB SHADOW E&M-EST. PATIENT-LVL I: CPT | Mod: PBBFAC,,,

## 2021-03-12 PROCEDURE — 94010 BREATHING CAPACITY TEST: ICD-10-PCS | Mod: 59,S$GLB,, | Performed by: INTERNAL MEDICINE

## 2021-03-12 PROCEDURE — 94010 BREATHING CAPACITY TEST: CPT | Mod: 59,S$GLB,, | Performed by: INTERNAL MEDICINE

## 2021-03-12 PROCEDURE — 71250 CT THORAX DX C-: CPT | Mod: TC

## 2021-03-12 PROCEDURE — 99999 PR PBB SHADOW E&M-EST. PATIENT-LVL V: ICD-10-PCS | Mod: PBBFAC,,, | Performed by: INTERNAL MEDICINE

## 2021-03-12 PROCEDURE — 94729 PR C02/MEMBANE DIFFUSE CAPACITY: ICD-10-PCS | Mod: S$GLB,,, | Performed by: INTERNAL MEDICINE

## 2021-03-12 PROCEDURE — 94618 PULMONARY STRESS TESTING: CPT | Mod: S$GLB,,, | Performed by: INTERNAL MEDICINE

## 2021-03-12 PROCEDURE — 99999 PR PBB SHADOW E&M-EST. PATIENT-LVL V: CPT | Mod: PBBFAC,,, | Performed by: INTERNAL MEDICINE

## 2021-03-12 PROCEDURE — 99999 PR PBB SHADOW E&M-EST. PATIENT-LVL I: ICD-10-PCS | Mod: PBBFAC,,,

## 2021-03-12 PROCEDURE — 99215 OFFICE O/P EST HI 40 MIN: CPT | Mod: 25,S$GLB,, | Performed by: INTERNAL MEDICINE

## 2021-03-12 PROCEDURE — 99215 PR OFFICE/OUTPT VISIT, EST, LEVL V, 40-54 MIN: ICD-10-PCS | Mod: 25,S$GLB,, | Performed by: INTERNAL MEDICINE

## 2021-03-12 PROCEDURE — 94618 PULMONARY STRESS TESTING: ICD-10-PCS | Mod: S$GLB,,, | Performed by: INTERNAL MEDICINE

## 2021-03-12 PROCEDURE — 94729 DIFFUSING CAPACITY: CPT | Mod: S$GLB,,, | Performed by: INTERNAL MEDICINE

## 2021-03-12 RX ORDER — MYCOPHENOLATE MOFETIL 250 MG/1
500 CAPSULE ORAL 2 TIMES DAILY
Qty: 120 CAPSULE | Refills: 0 | Status: SHIPPED | OUTPATIENT
Start: 2021-03-12 | End: 2021-04-12

## 2021-03-12 RX ORDER — PREDNISONE 10 MG/1
40 TABLET ORAL DAILY
Qty: 180 TABLET | Refills: 1 | Status: SHIPPED | OUTPATIENT
Start: 2021-03-12 | End: 2021-10-30

## 2021-03-12 RX ORDER — BENZONATATE 100 MG/1
100 CAPSULE ORAL 3 TIMES DAILY PRN
Qty: 120 CAPSULE | Refills: 3 | Status: SHIPPED | OUTPATIENT
Start: 2021-03-12 | End: 2021-10-30

## 2021-03-12 RX ORDER — IBANDRONATE SODIUM 150 MG/1
150 TABLET, FILM COATED ORAL
Qty: 1 TABLET | Refills: 11 | Status: SHIPPED | OUTPATIENT
Start: 2021-03-12 | End: 2022-04-26

## 2021-03-12 RX ORDER — SULFAMETHOXAZOLE AND TRIMETHOPRIM 800; 160 MG/1; MG/1
1 TABLET ORAL
Qty: 18 TABLET | Refills: 1 | Status: SHIPPED | OUTPATIENT
Start: 2021-03-12 | End: 2022-04-26

## 2021-03-14 DIAGNOSIS — R05.9 COUGH: Primary | ICD-10-CM

## 2021-03-15 ENCOUNTER — TELEPHONE (OUTPATIENT)
Dept: PULMONOLOGY | Facility: CLINIC | Age: 61
End: 2021-03-15

## 2021-03-15 ENCOUNTER — OFFICE VISIT (OUTPATIENT)
Dept: NEUROLOGY | Facility: CLINIC | Age: 61
End: 2021-03-15
Payer: MEDICARE

## 2021-03-15 ENCOUNTER — PATIENT MESSAGE (OUTPATIENT)
Dept: PULMONOLOGY | Facility: CLINIC | Age: 61
End: 2021-03-15

## 2021-03-15 VITALS
WEIGHT: 263.44 LBS | HEIGHT: 64 IN | HEART RATE: 90 BPM | RESPIRATION RATE: 16 BRPM | DIASTOLIC BLOOD PRESSURE: 74 MMHG | SYSTOLIC BLOOD PRESSURE: 118 MMHG | BODY MASS INDEX: 44.97 KG/M2

## 2021-03-15 DIAGNOSIS — J67.9 HYPERSENSITIVITY PNEUMONITIS: ICD-10-CM

## 2021-03-15 DIAGNOSIS — E66.01 CLASS 3 SEVERE OBESITY DUE TO EXCESS CALORIES WITHOUT SERIOUS COMORBIDITY WITH BODY MASS INDEX (BMI) OF 40.0 TO 44.9 IN ADULT: ICD-10-CM

## 2021-03-15 DIAGNOSIS — E78.5 HYPERLIPIDEMIA, UNSPECIFIED HYPERLIPIDEMIA TYPE: ICD-10-CM

## 2021-03-15 DIAGNOSIS — J96.11 CHRONIC RESPIRATORY FAILURE WITH HYPOXIA: ICD-10-CM

## 2021-03-15 DIAGNOSIS — J98.4 CRLD (CHRONIC RESTRICTIVE LUNG DISEASE): Chronic | ICD-10-CM

## 2021-03-15 DIAGNOSIS — L30.9 ECZEMA, UNSPECIFIED TYPE: ICD-10-CM

## 2021-03-15 DIAGNOSIS — R73.9 HYPERGLYCEMIA: ICD-10-CM

## 2021-03-15 DIAGNOSIS — G43.011 INTRACTABLE MIGRAINE WITHOUT AURA AND WITH STATUS MIGRAINOSUS: Primary | ICD-10-CM

## 2021-03-15 DIAGNOSIS — Z86.69 HX OF MIGRAINE HEADACHES: ICD-10-CM

## 2021-03-15 DIAGNOSIS — I10 HYPERTENSION, WELL CONTROLLED: ICD-10-CM

## 2021-03-15 PROCEDURE — 1125F AMNT PAIN NOTED PAIN PRSNT: CPT | Mod: S$GLB,,, | Performed by: PSYCHIATRY & NEUROLOGY

## 2021-03-15 PROCEDURE — 99215 PR OFFICE/OUTPT VISIT, EST, LEVL V, 40-54 MIN: ICD-10-PCS | Mod: S$GLB,,, | Performed by: PSYCHIATRY & NEUROLOGY

## 2021-03-15 PROCEDURE — 99999 PR PBB SHADOW E&M-EST. PATIENT-LVL V: CPT | Mod: PBBFAC,,, | Performed by: PSYCHIATRY & NEUROLOGY

## 2021-03-15 PROCEDURE — 99417 PR PROLONGED SVC, OUTPT, W/WO DIRECT PT CONTACT,  EA ADDTL 15 MIN: ICD-10-PCS | Mod: S$GLB,,, | Performed by: PSYCHIATRY & NEUROLOGY

## 2021-03-15 PROCEDURE — 99417 PROLNG OP E/M EACH 15 MIN: CPT | Mod: S$GLB,,, | Performed by: PSYCHIATRY & NEUROLOGY

## 2021-03-15 PROCEDURE — 3008F BODY MASS INDEX DOCD: CPT | Mod: CPTII,S$GLB,, | Performed by: PSYCHIATRY & NEUROLOGY

## 2021-03-15 PROCEDURE — 3008F PR BODY MASS INDEX (BMI) DOCUMENTED: ICD-10-PCS | Mod: CPTII,S$GLB,, | Performed by: PSYCHIATRY & NEUROLOGY

## 2021-03-15 PROCEDURE — 1125F PR PAIN SEVERITY QUANTIFIED, PAIN PRESENT: ICD-10-PCS | Mod: S$GLB,,, | Performed by: PSYCHIATRY & NEUROLOGY

## 2021-03-15 PROCEDURE — 99215 OFFICE O/P EST HI 40 MIN: CPT | Mod: S$GLB,,, | Performed by: PSYCHIATRY & NEUROLOGY

## 2021-03-15 PROCEDURE — 99999 PR PBB SHADOW E&M-EST. PATIENT-LVL V: ICD-10-PCS | Mod: PBBFAC,,, | Performed by: PSYCHIATRY & NEUROLOGY

## 2021-03-15 RX ORDER — RIMEGEPANT SULFATE 75 MG/75MG
75 TABLET, ORALLY DISINTEGRATING ORAL ONCE AS NEEDED
Qty: 8 TABLET | Refills: 2 | Status: SHIPPED | OUTPATIENT
Start: 2021-03-15 | End: 2022-06-16

## 2021-03-15 RX ORDER — BUTALBITAL, ACETAMINOPHEN AND CAFFEINE 50; 325; 40 MG/1; MG/1; MG/1
1 TABLET ORAL ONCE AS NEEDED
Qty: 10 TABLET | Refills: 3 | Status: SHIPPED | OUTPATIENT
Start: 2021-03-15 | End: 2021-03-15

## 2021-03-15 RX ORDER — PROMETHAZINE HYDROCHLORIDE 25 MG/1
25 TABLET ORAL EVERY 12 HOURS PRN
Qty: 48 TABLET | Refills: 3 | Status: SHIPPED | OUTPATIENT
Start: 2021-03-15 | End: 2022-04-26

## 2021-03-17 ENCOUNTER — TELEPHONE (OUTPATIENT)
Dept: NEUROLOGY | Facility: CLINIC | Age: 61
End: 2021-03-17

## 2021-03-18 ENCOUNTER — TELEPHONE (OUTPATIENT)
Dept: TRANSPLANT | Facility: CLINIC | Age: 61
End: 2021-03-18

## 2021-03-23 ENCOUNTER — HOSPITAL ENCOUNTER (OUTPATIENT)
Dept: RADIOLOGY | Facility: HOSPITAL | Age: 61
Discharge: HOME | End: 2021-03-23
Attending: PSYCHIATRY & NEUROLOGY
Payer: MEDICARE

## 2021-03-23 DIAGNOSIS — G43.011 INTRACTABLE MIGRAINE WITHOUT AURA AND WITH STATUS MIGRAINOSUS: ICD-10-CM

## 2021-03-23 PROCEDURE — 70450 CT HEAD/BRAIN W/O DYE: CPT | Mod: TC

## 2021-03-23 PROCEDURE — 70450 CT HEAD/BRAIN W/O DYE: CPT | Mod: 26,,, | Performed by: RADIOLOGY

## 2021-03-23 PROCEDURE — 70450 CT HEAD WITHOUT CONTRAST: ICD-10-PCS | Mod: 26,,, | Performed by: RADIOLOGY

## 2021-03-25 ENCOUNTER — PATIENT MESSAGE (OUTPATIENT)
Dept: PULMONOLOGY | Facility: CLINIC | Age: 61
End: 2021-03-25

## 2021-03-25 ENCOUNTER — TELEPHONE (OUTPATIENT)
Dept: PULMONOLOGY | Facility: CLINIC | Age: 61
End: 2021-03-25

## 2021-03-25 ENCOUNTER — TELEPHONE (OUTPATIENT)
Dept: NEUROLOGY | Facility: CLINIC | Age: 61
End: 2021-03-25

## 2021-03-25 RX ORDER — NARATRIPTAN 2.5 MG/1
TABLET ORAL
Qty: 9 TABLET | Refills: 0 | OUTPATIENT
Start: 2021-03-25

## 2021-03-29 ENCOUNTER — TELEPHONE (OUTPATIENT)
Dept: NEUROLOGY | Facility: CLINIC | Age: 61
End: 2021-03-29

## 2021-03-30 ENCOUNTER — OFFICE VISIT (OUTPATIENT)
Dept: INTERNAL MEDICINE | Facility: CLINIC | Age: 61
End: 2021-03-30
Payer: MEDICARE

## 2021-03-30 ENCOUNTER — LAB VISIT (OUTPATIENT)
Dept: LAB | Facility: HOSPITAL | Age: 61
End: 2021-03-30
Attending: FAMILY MEDICINE
Payer: MEDICARE

## 2021-03-30 VITALS
HEART RATE: 110 BPM | OXYGEN SATURATION: 98 % | WEIGHT: 261.69 LBS | HEIGHT: 64 IN | BODY MASS INDEX: 44.68 KG/M2 | DIASTOLIC BLOOD PRESSURE: 88 MMHG | TEMPERATURE: 98 F | SYSTOLIC BLOOD PRESSURE: 126 MMHG

## 2021-03-30 DIAGNOSIS — F33.41 RECURRENT MAJOR DEPRESSIVE DISORDER IN PARTIAL REMISSION: ICD-10-CM

## 2021-03-30 DIAGNOSIS — R73.9 HYPERGLYCEMIA: ICD-10-CM

## 2021-03-30 DIAGNOSIS — Z23 NEED FOR SHINGLES VACCINE: ICD-10-CM

## 2021-03-30 DIAGNOSIS — Z12.31 BREAST CANCER SCREENING BY MAMMOGRAM: ICD-10-CM

## 2021-03-30 DIAGNOSIS — J96.11 CHRONIC RESPIRATORY FAILURE WITH HYPOXIA: ICD-10-CM

## 2021-03-30 DIAGNOSIS — Z13.1 SCREENING FOR DIABETES MELLITUS: ICD-10-CM

## 2021-03-30 DIAGNOSIS — L20.84 INTRINSIC ECZEMA: ICD-10-CM

## 2021-03-30 DIAGNOSIS — Z11.4 SCREENING FOR HIV WITHOUT PRESENCE OF RISK FACTORS: ICD-10-CM

## 2021-03-30 DIAGNOSIS — Z11.59 ENCOUNTER FOR HEPATITIS C SCREENING TEST FOR LOW RISK PATIENT: ICD-10-CM

## 2021-03-30 DIAGNOSIS — G43.009 MIGRAINE WITHOUT AURA AND WITHOUT STATUS MIGRAINOSUS, NOT INTRACTABLE: Chronic | ICD-10-CM

## 2021-03-30 DIAGNOSIS — Z13.220 SCREENING FOR LIPID DISORDERS: ICD-10-CM

## 2021-03-30 DIAGNOSIS — I10 ESSENTIAL HYPERTENSION: Primary | Chronic | ICD-10-CM

## 2021-03-30 DIAGNOSIS — J30.1 SEASONAL ALLERGIC RHINITIS DUE TO POLLEN: ICD-10-CM

## 2021-03-30 DIAGNOSIS — D84.9 IMMUNOSUPPRESSED STATUS: ICD-10-CM

## 2021-03-30 DIAGNOSIS — E78.5 HYPERLIPIDEMIA, UNSPECIFIED HYPERLIPIDEMIA TYPE: ICD-10-CM

## 2021-03-30 DIAGNOSIS — Z12.12 SCREENING FOR COLORECTAL CANCER: ICD-10-CM

## 2021-03-30 DIAGNOSIS — Z79.52 LONG TERM CURRENT USE OF SYSTEMIC STEROIDS: Chronic | ICD-10-CM

## 2021-03-30 DIAGNOSIS — E66.01 CLASS 3 SEVERE OBESITY DUE TO EXCESS CALORIES WITHOUT SERIOUS COMORBIDITY WITH BODY MASS INDEX (BMI) OF 40.0 TO 44.9 IN ADULT: ICD-10-CM

## 2021-03-30 DIAGNOSIS — Z12.11 SCREENING FOR COLORECTAL CANCER: ICD-10-CM

## 2021-03-30 DIAGNOSIS — I10 ESSENTIAL HYPERTENSION: Chronic | ICD-10-CM

## 2021-03-30 DIAGNOSIS — Z91.89 AT RISK FOR OSTEOPOROSIS: ICD-10-CM

## 2021-03-30 DIAGNOSIS — Z01.419 PAP SMEAR, AS PART OF ROUTINE GYNECOLOGICAL EXAMINATION: ICD-10-CM

## 2021-03-30 PROCEDURE — 99499 UNLISTED E&M SERVICE: CPT | Mod: ,,, | Performed by: FAMILY MEDICINE

## 2021-03-30 PROCEDURE — 36415 COLL VENOUS BLD VENIPUNCTURE: CPT | Performed by: FAMILY MEDICINE

## 2021-03-30 PROCEDURE — 83036 HEMOGLOBIN GLYCOSYLATED A1C: CPT | Performed by: FAMILY MEDICINE

## 2021-03-30 PROCEDURE — 99499 RISK ADDL DX/OHS AUDIT: ICD-10-PCS | Mod: ,,, | Performed by: FAMILY MEDICINE

## 2021-03-30 PROCEDURE — 86803 HEPATITIS C AB TEST: CPT | Performed by: FAMILY MEDICINE

## 2021-03-30 PROCEDURE — 80061 LIPID PANEL: CPT | Performed by: FAMILY MEDICINE

## 2021-03-30 PROCEDURE — 3079F PR MOST RECENT DIASTOLIC BLOOD PRESSURE 80-89 MM HG: ICD-10-PCS | Mod: CPTII,S$GLB,, | Performed by: FAMILY MEDICINE

## 2021-03-30 PROCEDURE — 99499 UNLISTED E&M SERVICE: CPT | Mod: S$GLB,,, | Performed by: FAMILY MEDICINE

## 2021-03-30 PROCEDURE — 99999 PR PBB SHADOW E&M-EST. PATIENT-LVL V: ICD-10-PCS | Mod: PBBFAC,,, | Performed by: FAMILY MEDICINE

## 2021-03-30 PROCEDURE — 99999 PR PBB SHADOW E&M-EST. PATIENT-LVL V: CPT | Mod: PBBFAC,,, | Performed by: FAMILY MEDICINE

## 2021-03-30 PROCEDURE — 86703 HIV-1/HIV-2 1 RESULT ANTBDY: CPT | Performed by: FAMILY MEDICINE

## 2021-03-30 PROCEDURE — 3008F BODY MASS INDEX DOCD: CPT | Mod: CPTII,S$GLB,, | Performed by: FAMILY MEDICINE

## 2021-03-30 PROCEDURE — 3079F DIAST BP 80-89 MM HG: CPT | Mod: CPTII,S$GLB,, | Performed by: FAMILY MEDICINE

## 2021-03-30 PROCEDURE — 99215 OFFICE O/P EST HI 40 MIN: CPT | Mod: S$GLB,,, | Performed by: FAMILY MEDICINE

## 2021-03-30 PROCEDURE — 1125F AMNT PAIN NOTED PAIN PRSNT: CPT | Mod: S$GLB,,, | Performed by: FAMILY MEDICINE

## 2021-03-30 PROCEDURE — 99215 PR OFFICE/OUTPT VISIT, EST, LEVL V, 40-54 MIN: ICD-10-PCS | Mod: S$GLB,,, | Performed by: FAMILY MEDICINE

## 2021-03-30 PROCEDURE — 3008F PR BODY MASS INDEX (BMI) DOCUMENTED: ICD-10-PCS | Mod: CPTII,S$GLB,, | Performed by: FAMILY MEDICINE

## 2021-03-30 PROCEDURE — 3074F PR MOST RECENT SYSTOLIC BLOOD PRESSURE < 130 MM HG: ICD-10-PCS | Mod: CPTII,S$GLB,, | Performed by: FAMILY MEDICINE

## 2021-03-30 PROCEDURE — 99499 RISK ADDL DX/OHS AUDIT: ICD-10-PCS | Mod: S$GLB,,, | Performed by: FAMILY MEDICINE

## 2021-03-30 PROCEDURE — 3074F SYST BP LT 130 MM HG: CPT | Mod: CPTII,S$GLB,, | Performed by: FAMILY MEDICINE

## 2021-03-30 PROCEDURE — 1125F PR PAIN SEVERITY QUANTIFIED, PAIN PRESENT: ICD-10-PCS | Mod: S$GLB,,, | Performed by: FAMILY MEDICINE

## 2021-03-30 RX ORDER — FLUTICASONE PROPIONATE 50 MCG
2 SPRAY, SUSPENSION (ML) NASAL DAILY
Qty: 48 G | Refills: 4 | Status: SHIPPED | OUTPATIENT
Start: 2021-03-30

## 2021-03-30 RX ORDER — TIZANIDINE 4 MG/1
4 TABLET ORAL NIGHTLY PRN
Qty: 30 TABLET | Refills: 0 | Status: SHIPPED | OUTPATIENT
Start: 2021-03-30 | End: 2021-05-03 | Stop reason: SDUPTHER

## 2021-03-30 RX ORDER — NARATRIPTAN 2.5 MG/1
2.5 TABLET ORAL DAILY PRN
Qty: 9 TABLET | Refills: 0 | Status: SHIPPED | OUTPATIENT
Start: 2021-03-30 | End: 2021-05-01 | Stop reason: SDUPTHER

## 2021-03-30 RX ORDER — BUPROPION HYDROCHLORIDE 150 MG/1
150 TABLET ORAL DAILY
Qty: 90 TABLET | Refills: 4 | Status: SHIPPED | OUTPATIENT
Start: 2021-03-30 | End: 2022-11-03 | Stop reason: SDUPTHER

## 2021-03-30 RX ORDER — BUTALBITAL, ACETAMINOPHEN AND CAFFEINE 50; 325; 40 MG/1; MG/1; MG/1
TABLET ORAL
COMMUNITY
Start: 2021-03-16 | End: 2021-05-12

## 2021-03-30 RX ORDER — VERAPAMIL HYDROCHLORIDE 180 MG/1
180 TABLET, FILM COATED, EXTENDED RELEASE ORAL DAILY
Qty: 90 TABLET | Refills: 4 | Status: SHIPPED | OUTPATIENT
Start: 2021-03-30 | End: 2021-05-14

## 2021-03-30 RX ORDER — TRIAMCINOLONE ACETONIDE 1 MG/G
CREAM TOPICAL 2 TIMES DAILY
Qty: 45 G | Refills: 5 | Status: SHIPPED | OUTPATIENT
Start: 2021-03-30

## 2021-03-30 RX ORDER — ESCITALOPRAM OXALATE 20 MG/1
20 TABLET ORAL DAILY
Qty: 90 TABLET | Refills: 4 | Status: SHIPPED | OUTPATIENT
Start: 2021-03-30 | End: 2022-11-04 | Stop reason: SDUPTHER

## 2021-03-31 LAB
CHOLEST SERPL-MCNC: 203 MG/DL (ref 120–199)
CHOLEST/HDLC SERPL: 3 {RATIO} (ref 2–5)
ESTIMATED AVG GLUCOSE: 111 MG/DL (ref 68–131)
HBA1C MFR BLD: 5.5 % (ref 4–5.6)
HCV AB SERPL QL IA: NEGATIVE
HDLC SERPL-MCNC: 67 MG/DL (ref 40–75)
HDLC SERPL: 33 % (ref 20–50)
HIV 1+2 AB+HIV1 P24 AG SERPL QL IA: NEGATIVE
LDLC SERPL CALC-MCNC: 118.4 MG/DL (ref 63–159)
NONHDLC SERPL-MCNC: 136 MG/DL
TRIGL SERPL-MCNC: 88 MG/DL (ref 30–150)

## 2021-04-13 ENCOUNTER — PATIENT MESSAGE (OUTPATIENT)
Dept: CARDIOLOGY | Facility: HOSPITAL | Age: 61
End: 2021-04-13

## 2021-04-21 ENCOUNTER — TELEPHONE (OUTPATIENT)
Dept: NEUROLOGY | Facility: CLINIC | Age: 61
End: 2021-04-21

## 2021-04-28 ENCOUNTER — PATIENT MESSAGE (OUTPATIENT)
Dept: CARDIOLOGY | Facility: HOSPITAL | Age: 61
End: 2021-04-28

## 2021-04-28 LAB — NONINV COLON CA DNA+OCC BLD SCRN STL QL: NEGATIVE

## 2021-05-03 ENCOUNTER — TELEPHONE (OUTPATIENT)
Dept: NEUROLOGY | Facility: CLINIC | Age: 61
End: 2021-05-03

## 2021-05-03 ENCOUNTER — PROCEDURE VISIT (OUTPATIENT)
Dept: NEUROLOGY | Facility: CLINIC | Age: 61
End: 2021-05-03
Payer: MEDICARE

## 2021-05-03 ENCOUNTER — PATIENT MESSAGE (OUTPATIENT)
Dept: ADMINISTRATIVE | Facility: OTHER | Age: 61
End: 2021-05-03

## 2021-05-03 ENCOUNTER — TELEPHONE (OUTPATIENT)
Dept: ORTHOPEDICS | Facility: CLINIC | Age: 61
End: 2021-05-03

## 2021-05-03 DIAGNOSIS — I10 ESSENTIAL HYPERTENSION: Chronic | ICD-10-CM

## 2021-05-03 DIAGNOSIS — L30.9 ECZEMA, UNSPECIFIED TYPE: ICD-10-CM

## 2021-05-03 DIAGNOSIS — J98.4 CRLD (CHRONIC RESTRICTIVE LUNG DISEASE): Chronic | ICD-10-CM

## 2021-05-03 DIAGNOSIS — E78.5 HYPERLIPIDEMIA, UNSPECIFIED HYPERLIPIDEMIA TYPE: ICD-10-CM

## 2021-05-03 DIAGNOSIS — E66.01 CLASS 3 SEVERE OBESITY DUE TO EXCESS CALORIES WITHOUT SERIOUS COMORBIDITY WITH BODY MASS INDEX (BMI) OF 40.0 TO 44.9 IN ADULT: ICD-10-CM

## 2021-05-03 DIAGNOSIS — E87.20 METABOLIC ACIDEMIA: Chronic | ICD-10-CM

## 2021-05-03 DIAGNOSIS — Z79.52 LONG TERM CURRENT USE OF SYSTEMIC STEROIDS: Chronic | ICD-10-CM

## 2021-05-03 DIAGNOSIS — J96.11 CHRONIC RESPIRATORY FAILURE WITH HYPOXIA: ICD-10-CM

## 2021-05-03 DIAGNOSIS — Z96.82 PRESENCE OF NEUROSTIMULATOR: Primary | ICD-10-CM

## 2021-05-03 DIAGNOSIS — G43.009 MIGRAINE WITHOUT AURA AND WITHOUT STATUS MIGRAINOSUS, NOT INTRACTABLE: Primary | Chronic | ICD-10-CM

## 2021-05-03 DIAGNOSIS — D84.9 IMMUNOSUPPRESSED STATUS: ICD-10-CM

## 2021-05-03 DIAGNOSIS — J67.9 HYPERSENSITIVITY PNEUMONITIS: ICD-10-CM

## 2021-05-03 DIAGNOSIS — R73.9 HYPERGLYCEMIA: ICD-10-CM

## 2021-05-03 PROCEDURE — 99499 RISK ADDL DX/OHS AUDIT: ICD-10-PCS | Mod: HCNC,S$GLB,, | Performed by: PSYCHIATRY & NEUROLOGY

## 2021-05-03 PROCEDURE — 64615 CHEMODENERV MUSC MIGRAINE: CPT | Mod: S$GLB,,, | Performed by: PSYCHIATRY & NEUROLOGY

## 2021-05-03 PROCEDURE — 64615 PR CHEMODENERVATION OF MUSCLE FOR CHRONIC MIGRAINE: ICD-10-PCS | Mod: S$GLB,,, | Performed by: PSYCHIATRY & NEUROLOGY

## 2021-05-03 PROCEDURE — 99499 UNLISTED E&M SERVICE: CPT | Mod: HCNC,S$GLB,, | Performed by: PSYCHIATRY & NEUROLOGY

## 2021-05-03 RX ORDER — TIZANIDINE 4 MG/1
4 TABLET ORAL NIGHTLY PRN
Qty: 30 TABLET | Refills: 3 | Status: SHIPPED | OUTPATIENT
Start: 2021-05-03 | End: 2021-07-08 | Stop reason: SDUPTHER

## 2021-05-03 RX ORDER — NARATRIPTAN 2.5 MG/1
2.5 TABLET ORAL ONCE AS NEEDED
Qty: 9 TABLET | Refills: 3 | Status: SHIPPED | OUTPATIENT
Start: 2021-05-03 | End: 2021-05-12

## 2021-05-12 ENCOUNTER — TELEPHONE (OUTPATIENT)
Dept: NEUROLOGY | Facility: CLINIC | Age: 61
End: 2021-05-12

## 2021-05-12 DIAGNOSIS — G43.009 MIGRAINE WITHOUT AURA AND WITHOUT STATUS MIGRAINOSUS, NOT INTRACTABLE: Primary | ICD-10-CM

## 2021-05-12 RX ORDER — ELETRIPTAN HYDROBROMIDE 40 MG/1
40 TABLET, FILM COATED ORAL ONCE AS NEEDED
Qty: 9 TABLET | Refills: 3 | Status: SHIPPED | OUTPATIENT
Start: 2021-05-12 | End: 2021-07-08 | Stop reason: SDUPTHER

## 2021-05-13 DIAGNOSIS — I10 ESSENTIAL HYPERTENSION: Chronic | ICD-10-CM

## 2021-05-13 DIAGNOSIS — G43.009 MIGRAINE WITHOUT AURA AND WITHOUT STATUS MIGRAINOSUS, NOT INTRACTABLE: Chronic | ICD-10-CM

## 2021-05-14 RX ORDER — VERAPAMIL HYDROCHLORIDE 180 MG/1
TABLET, FILM COATED, EXTENDED RELEASE ORAL
Qty: 90 TABLET | Refills: 4 | Status: SHIPPED | OUTPATIENT
Start: 2021-05-14 | End: 2022-05-17

## 2021-05-17 ENCOUNTER — TELEPHONE (OUTPATIENT)
Dept: PHARMACY | Facility: CLINIC | Age: 61
End: 2021-05-17

## 2021-05-21 ENCOUNTER — TELEPHONE (OUTPATIENT)
Dept: PHARMACY | Facility: CLINIC | Age: 61
End: 2021-05-21

## 2021-05-24 ENCOUNTER — TELEPHONE (OUTPATIENT)
Dept: PHARMACY | Facility: CLINIC | Age: 61
End: 2021-05-24

## 2021-05-24 ENCOUNTER — PATIENT MESSAGE (OUTPATIENT)
Dept: PULMONOLOGY | Facility: CLINIC | Age: 61
End: 2021-05-24

## 2021-05-27 ENCOUNTER — PATIENT MESSAGE (OUTPATIENT)
Dept: SURGERY | Facility: CLINIC | Age: 61
End: 2021-05-27

## 2021-06-03 ENCOUNTER — TELEPHONE (OUTPATIENT)
Dept: INTERNAL MEDICINE | Facility: CLINIC | Age: 61
End: 2021-06-03

## 2021-06-04 ENCOUNTER — TELEPHONE (OUTPATIENT)
Dept: PULMONOLOGY | Facility: CLINIC | Age: 61
End: 2021-06-04

## 2021-06-04 ENCOUNTER — PATIENT MESSAGE (OUTPATIENT)
Dept: PULMONOLOGY | Facility: CLINIC | Age: 61
End: 2021-06-04

## 2021-06-04 DIAGNOSIS — J96.11 CHRONIC RESPIRATORY FAILURE WITH HYPOXIA: Primary | ICD-10-CM

## 2021-06-18 ENCOUNTER — PATIENT MESSAGE (OUTPATIENT)
Dept: PULMONOLOGY | Facility: CLINIC | Age: 61
End: 2021-06-18

## 2021-06-18 ENCOUNTER — TELEPHONE (OUTPATIENT)
Dept: PULMONOLOGY | Facility: CLINIC | Age: 61
End: 2021-06-18

## 2021-06-24 DIAGNOSIS — J67.9 HYPERSENSITIVITY PNEUMONITIS: Primary | ICD-10-CM

## 2021-06-25 ENCOUNTER — PATIENT MESSAGE (OUTPATIENT)
Dept: NEUROLOGY | Facility: CLINIC | Age: 61
End: 2021-06-25

## 2021-06-25 DIAGNOSIS — G43.009 MIGRAINE WITHOUT AURA AND WITHOUT STATUS MIGRAINOSUS, NOT INTRACTABLE: Primary | ICD-10-CM

## 2021-06-25 RX ORDER — RIMEGEPANT SULFATE 75 MG/75MG
75 TABLET, ORALLY DISINTEGRATING ORAL EVERY OTHER DAY
Qty: 16 TABLET | Refills: 5 | Status: SHIPPED | OUTPATIENT
Start: 2021-06-25 | End: 2021-11-15

## 2021-07-01 ENCOUNTER — TELEPHONE (OUTPATIENT)
Dept: NEUROLOGY | Facility: CLINIC | Age: 61
End: 2021-07-01

## 2021-07-01 DIAGNOSIS — G43.011 INTRACTABLE MIGRAINE WITHOUT AURA AND WITH STATUS MIGRAINOSUS: ICD-10-CM

## 2021-07-02 ENCOUNTER — TELEPHONE (OUTPATIENT)
Dept: NEUROLOGY | Facility: CLINIC | Age: 61
End: 2021-07-02

## 2021-07-02 ENCOUNTER — HOSPITAL ENCOUNTER (EMERGENCY)
Facility: HOSPITAL | Age: 61
Discharge: HOME | End: 2021-07-02
Attending: EMERGENCY MEDICINE
Payer: MEDICARE

## 2021-07-02 VITALS
OXYGEN SATURATION: 98 % | RESPIRATION RATE: 19 BRPM | SYSTOLIC BLOOD PRESSURE: 128 MMHG | HEART RATE: 80 BPM | WEIGHT: 261.13 LBS | TEMPERATURE: 99 F | BODY MASS INDEX: 44.82 KG/M2 | DIASTOLIC BLOOD PRESSURE: 60 MMHG

## 2021-07-02 DIAGNOSIS — G43.909 MIGRAINE WITHOUT STATUS MIGRAINOSUS, NOT INTRACTABLE, UNSPECIFIED MIGRAINE TYPE: Primary | ICD-10-CM

## 2021-07-02 PROCEDURE — 96375 TX/PRO/DX INJ NEW DRUG ADDON: CPT

## 2021-07-02 PROCEDURE — 96374 THER/PROPH/DIAG INJ IV PUSH: CPT

## 2021-07-02 PROCEDURE — 25000003 PHARM REV CODE 250: Performed by: NURSE PRACTITIONER

## 2021-07-02 PROCEDURE — 63600175 PHARM REV CODE 636 W HCPCS: Performed by: NURSE PRACTITIONER

## 2021-07-02 PROCEDURE — 96361 HYDRATE IV INFUSION ADD-ON: CPT

## 2021-07-02 PROCEDURE — 99285 EMERGENCY DEPT VISIT HI MDM: CPT | Mod: 25

## 2021-07-02 RX ORDER — KETOROLAC TROMETHAMINE 30 MG/ML
30 INJECTION, SOLUTION INTRAMUSCULAR; INTRAVENOUS
Status: COMPLETED | OUTPATIENT
Start: 2021-07-02 | End: 2021-07-02

## 2021-07-02 RX ORDER — TOPIRAMATE 100 MG/1
TABLET, FILM COATED ORAL
Qty: 180 TABLET | Refills: 3 | Status: SHIPPED | OUTPATIENT
Start: 2021-07-02 | End: 2022-08-08

## 2021-07-02 RX ORDER — BUTALBITAL, ACETAMINOPHEN AND CAFFEINE 50; 325; 40 MG/1; MG/1; MG/1
1 TABLET ORAL EVERY 4 HOURS PRN
Qty: 20 TABLET | Refills: 0 | Status: SHIPPED | OUTPATIENT
Start: 2021-07-02 | End: 2021-07-22

## 2021-07-02 RX ORDER — PROCHLORPERAZINE EDISYLATE 5 MG/ML
10 INJECTION INTRAMUSCULAR; INTRAVENOUS
Status: COMPLETED | OUTPATIENT
Start: 2021-07-02 | End: 2021-07-02

## 2021-07-02 RX ORDER — DIPHENHYDRAMINE HYDROCHLORIDE 50 MG/ML
12.5 INJECTION INTRAMUSCULAR; INTRAVENOUS
Status: COMPLETED | OUTPATIENT
Start: 2021-07-02 | End: 2021-07-02

## 2021-07-02 RX ADMIN — SODIUM CHLORIDE 250 ML: 0.9 INJECTION, SOLUTION INTRAVENOUS at 05:07

## 2021-07-02 RX ADMIN — KETOROLAC TROMETHAMINE 30 MG: 30 INJECTION, SOLUTION INTRAMUSCULAR; INTRAVENOUS at 05:07

## 2021-07-02 RX ADMIN — PROCHLORPERAZINE EDISYLATE 10 MG: 5 INJECTION INTRAMUSCULAR; INTRAVENOUS at 05:07

## 2021-07-02 RX ADMIN — DIPHENHYDRAMINE HYDROCHLORIDE 12.5 MG: 50 INJECTION INTRAMUSCULAR; INTRAVENOUS at 05:07

## 2021-07-08 ENCOUNTER — PATIENT MESSAGE (OUTPATIENT)
Dept: NEUROLOGY | Facility: CLINIC | Age: 61
End: 2021-07-08

## 2021-07-08 DIAGNOSIS — G43.009 MIGRAINE WITHOUT AURA AND WITHOUT STATUS MIGRAINOSUS, NOT INTRACTABLE: Chronic | ICD-10-CM

## 2021-07-08 RX ORDER — TIZANIDINE 4 MG/1
4 TABLET ORAL NIGHTLY PRN
Qty: 30 TABLET | Refills: 3 | Status: SHIPPED | OUTPATIENT
Start: 2021-07-08 | End: 2021-12-06

## 2021-07-08 RX ORDER — ELETRIPTAN HYDROBROMIDE 40 MG/1
40 TABLET, FILM COATED ORAL ONCE AS NEEDED
Qty: 9 TABLET | Refills: 3 | Status: SHIPPED | OUTPATIENT
Start: 2021-07-08 | End: 2021-07-09 | Stop reason: SDUPTHER

## 2021-07-09 ENCOUNTER — PATIENT MESSAGE (OUTPATIENT)
Dept: NEUROLOGY | Facility: CLINIC | Age: 61
End: 2021-07-09

## 2021-07-09 RX ORDER — NARATRIPTAN 2.5 MG/1
TABLET ORAL
Qty: 9 TABLET | Refills: 2 | Status: SHIPPED | OUTPATIENT
Start: 2021-07-09 | End: 2021-11-01

## 2021-07-09 RX ORDER — ELETRIPTAN HYDROBROMIDE 40 MG/1
40 TABLET, FILM COATED ORAL ONCE AS NEEDED
Qty: 9 TABLET | Refills: 3 | Status: SHIPPED | OUTPATIENT
Start: 2021-07-09 | End: 2021-11-01 | Stop reason: SDUPTHER

## 2021-07-12 ENCOUNTER — TELEPHONE (OUTPATIENT)
Dept: NEUROLOGY | Facility: CLINIC | Age: 61
End: 2021-07-12

## 2021-07-19 ENCOUNTER — PATIENT MESSAGE (OUTPATIENT)
Dept: NEUROLOGY | Facility: CLINIC | Age: 61
End: 2021-07-19

## 2021-07-19 ENCOUNTER — TELEPHONE (OUTPATIENT)
Dept: NEUROLOGY | Facility: CLINIC | Age: 61
End: 2021-07-19

## 2021-07-23 ENCOUNTER — PATIENT MESSAGE (OUTPATIENT)
Dept: PULMONOLOGY | Facility: CLINIC | Age: 61
End: 2021-07-23

## 2021-07-24 ENCOUNTER — PATIENT MESSAGE (OUTPATIENT)
Dept: INTERNAL MEDICINE | Facility: CLINIC | Age: 61
End: 2021-07-24

## 2021-07-28 DIAGNOSIS — J96.11 CHRONIC RESPIRATORY FAILURE WITH HYPOXIA: Primary | ICD-10-CM

## 2021-08-04 ENCOUNTER — PATIENT MESSAGE (OUTPATIENT)
Dept: PULMONOLOGY | Facility: CLINIC | Age: 61
End: 2021-08-04

## 2021-08-09 ENCOUNTER — TELEPHONE (OUTPATIENT)
Dept: NEUROLOGY | Facility: CLINIC | Age: 61
End: 2021-08-09

## 2021-08-11 ENCOUNTER — TELEPHONE (OUTPATIENT)
Dept: PULMONOLOGY | Facility: CLINIC | Age: 61
End: 2021-08-11

## 2021-08-24 ENCOUNTER — TELEPHONE (OUTPATIENT)
Dept: NEUROLOGY | Facility: CLINIC | Age: 61
End: 2021-08-24

## 2021-09-20 ENCOUNTER — TELEPHONE (OUTPATIENT)
Dept: NEUROLOGY | Facility: CLINIC | Age: 61
End: 2021-09-20

## 2021-09-30 ENCOUNTER — PATIENT MESSAGE (OUTPATIENT)
Dept: PULMONOLOGY | Facility: CLINIC | Age: 61
End: 2021-09-30

## 2021-10-12 ENCOUNTER — TELEPHONE (OUTPATIENT)
Dept: NEUROLOGY | Facility: CLINIC | Age: 61
End: 2021-10-12

## 2021-10-26 ENCOUNTER — TELEPHONE (OUTPATIENT)
Dept: NEUROLOGY | Facility: CLINIC | Age: 61
End: 2021-10-26
Payer: MEDICARE

## 2021-11-08 ENCOUNTER — OFFICE VISIT (OUTPATIENT)
Dept: SURGERY | Facility: CLINIC | Age: 61
End: 2021-11-08
Payer: MEDICARE

## 2021-11-08 VITALS
DIASTOLIC BLOOD PRESSURE: 64 MMHG | TEMPERATURE: 98 F | SYSTOLIC BLOOD PRESSURE: 109 MMHG | WEIGHT: 250 LBS | HEART RATE: 69 BPM | BODY MASS INDEX: 42.68 KG/M2 | HEIGHT: 64 IN

## 2021-11-08 DIAGNOSIS — I10 ESSENTIAL HYPERTENSION: Chronic | ICD-10-CM

## 2021-11-08 DIAGNOSIS — E66.01 MORBID OBESITY: Primary | ICD-10-CM

## 2021-11-08 DIAGNOSIS — E78.5 HYPERLIPIDEMIA, UNSPECIFIED HYPERLIPIDEMIA TYPE: ICD-10-CM

## 2021-11-08 PROCEDURE — 99499 RISK ADDL DX/OHS AUDIT: ICD-10-PCS | Mod: S$GLB,,, | Performed by: SURGERY

## 2021-11-08 PROCEDURE — 3078F DIAST BP <80 MM HG: CPT | Mod: HCNC,CPTII,S$GLB, | Performed by: SURGERY

## 2021-11-08 PROCEDURE — 3044F PR MOST RECENT HEMOGLOBIN A1C LEVEL <7.0%: ICD-10-PCS | Mod: HCNC,CPTII,S$GLB, | Performed by: SURGERY

## 2021-11-08 PROCEDURE — 3008F PR BODY MASS INDEX (BMI) DOCUMENTED: ICD-10-PCS | Mod: HCNC,CPTII,S$GLB, | Performed by: SURGERY

## 2021-11-08 PROCEDURE — 1159F MED LIST DOCD IN RCRD: CPT | Mod: HCNC,CPTII,S$GLB, | Performed by: SURGERY

## 2021-11-08 PROCEDURE — 3044F HG A1C LEVEL LT 7.0%: CPT | Mod: HCNC,CPTII,S$GLB, | Performed by: SURGERY

## 2021-11-08 PROCEDURE — 3078F PR MOST RECENT DIASTOLIC BLOOD PRESSURE < 80 MM HG: ICD-10-PCS | Mod: HCNC,CPTII,S$GLB, | Performed by: SURGERY

## 2021-11-08 PROCEDURE — 99999 PR PBB SHADOW E&M-EST. PATIENT-LVL V: ICD-10-PCS | Mod: PBBFAC,HCNC,, | Performed by: SURGERY

## 2021-11-08 PROCEDURE — 99499 UNLISTED E&M SERVICE: CPT | Mod: S$GLB,,, | Performed by: SURGERY

## 2021-11-08 PROCEDURE — 1159F PR MEDICATION LIST DOCUMENTED IN MEDICAL RECORD: ICD-10-PCS | Mod: HCNC,CPTII,S$GLB, | Performed by: SURGERY

## 2021-11-08 PROCEDURE — 99204 PR OFFICE/OUTPT VISIT, NEW, LEVL IV, 45-59 MIN: ICD-10-PCS | Mod: HCNC,S$GLB,, | Performed by: SURGERY

## 2021-11-08 PROCEDURE — 99999 PR PBB SHADOW E&M-EST. PATIENT-LVL V: CPT | Mod: PBBFAC,HCNC,, | Performed by: SURGERY

## 2021-11-08 PROCEDURE — 1160F RVW MEDS BY RX/DR IN RCRD: CPT | Mod: HCNC,CPTII,S$GLB, | Performed by: SURGERY

## 2021-11-08 PROCEDURE — 3008F BODY MASS INDEX DOCD: CPT | Mod: HCNC,CPTII,S$GLB, | Performed by: SURGERY

## 2021-11-08 PROCEDURE — 3074F SYST BP LT 130 MM HG: CPT | Mod: HCNC,CPTII,S$GLB, | Performed by: SURGERY

## 2021-11-08 PROCEDURE — 99204 OFFICE O/P NEW MOD 45 MIN: CPT | Mod: HCNC,S$GLB,, | Performed by: SURGERY

## 2021-11-08 PROCEDURE — 3074F PR MOST RECENT SYSTOLIC BLOOD PRESSURE < 130 MM HG: ICD-10-PCS | Mod: HCNC,CPTII,S$GLB, | Performed by: SURGERY

## 2021-11-08 PROCEDURE — 1160F PR REVIEW ALL MEDS BY PRESCRIBER/CLIN PHARMACIST DOCUMENTED: ICD-10-PCS | Mod: HCNC,CPTII,S$GLB, | Performed by: SURGERY

## 2021-11-12 ENCOUNTER — TELEPHONE (OUTPATIENT)
Dept: PULMONOLOGY | Facility: CLINIC | Age: 61
End: 2021-11-12
Payer: MEDICARE

## 2021-11-12 ENCOUNTER — LAB VISIT (OUTPATIENT)
Dept: PRIMARY CARE CLINIC | Facility: CLINIC | Age: 61
End: 2021-11-12
Payer: MEDICARE

## 2021-11-12 DIAGNOSIS — J96.11 CHRONIC RESPIRATORY FAILURE WITH HYPOXIA: ICD-10-CM

## 2021-11-12 PROCEDURE — U0003 INFECTIOUS AGENT DETECTION BY NUCLEIC ACID (DNA OR RNA); SEVERE ACUTE RESPIRATORY SYNDROME CORONAVIRUS 2 (SARS-COV-2) (CORONAVIRUS DISEASE [COVID-19]), AMPLIFIED PROBE TECHNIQUE, MAKING USE OF HIGH THROUGHPUT TECHNOLOGIES AS DESCRIBED BY CMS-2020-01-R: HCPCS | Performed by: INTERNAL MEDICINE

## 2021-11-12 PROCEDURE — U0005 INFEC AGEN DETEC AMPLI PROBE: HCPCS | Performed by: INTERNAL MEDICINE

## 2021-11-13 LAB
SARS-COV-2 RNA RESP QL NAA+PROBE: NOT DETECTED
SARS-COV-2- CYCLE NUMBER: NORMAL

## 2021-11-15 ENCOUNTER — CLINICAL SUPPORT (OUTPATIENT)
Dept: PULMONOLOGY | Facility: CLINIC | Age: 61
End: 2021-11-15
Payer: MEDICARE

## 2021-11-15 ENCOUNTER — HOSPITAL ENCOUNTER (OUTPATIENT)
Dept: RADIOLOGY | Facility: HOSPITAL | Age: 61
Discharge: HOME | End: 2021-11-15
Attending: INTERNAL MEDICINE
Payer: MEDICARE

## 2021-11-15 ENCOUNTER — OFFICE VISIT (OUTPATIENT)
Dept: PULMONOLOGY | Facility: CLINIC | Age: 61
End: 2021-11-15
Payer: MEDICARE

## 2021-11-15 VITALS
DIASTOLIC BLOOD PRESSURE: 68 MMHG | WEIGHT: 246.94 LBS | HEIGHT: 64 IN | BODY MASS INDEX: 42.16 KG/M2 | BODY MASS INDEX: 42.16 KG/M2 | OXYGEN SATURATION: 99 % | RESPIRATION RATE: 16 BRPM | HEART RATE: 100 BPM | HEIGHT: 64 IN | WEIGHT: 246.94 LBS | SYSTOLIC BLOOD PRESSURE: 133 MMHG

## 2021-11-15 DIAGNOSIS — J67.9 HYPERSENSITIVITY PNEUMONITIS: Chronic | ICD-10-CM

## 2021-11-15 DIAGNOSIS — Z79.52 CURRENT CHRONIC USE OF SYSTEMIC STEROIDS: ICD-10-CM

## 2021-11-15 DIAGNOSIS — J67.9 HYPERSENSITIVITY PNEUMONITIS: ICD-10-CM

## 2021-11-15 DIAGNOSIS — J96.11 CHRONIC RESPIRATORY FAILURE WITH HYPOXIA: ICD-10-CM

## 2021-11-15 DIAGNOSIS — J98.4 RESTRICTIVE LUNG DISEASE: ICD-10-CM

## 2021-11-15 DIAGNOSIS — R09.02 EXERCISE HYPOXEMIA: Primary | ICD-10-CM

## 2021-11-15 DIAGNOSIS — Z79.899 ON CELLCEPT THERAPY: ICD-10-CM

## 2021-11-15 DIAGNOSIS — Z79.899 HIGH RISK MEDICATION USE: ICD-10-CM

## 2021-11-15 PROCEDURE — 3075F SYST BP GE 130 - 139MM HG: CPT | Mod: HCNC,CPTII,S$GLB, | Performed by: INTERNAL MEDICINE

## 2021-11-15 PROCEDURE — 94010 BREATHING CAPACITY TEST: CPT | Mod: HCNC,S$GLB,, | Performed by: INTERNAL MEDICINE

## 2021-11-15 PROCEDURE — 94729 DIFFUSING CAPACITY: CPT | Mod: HCNC,S$GLB,, | Performed by: INTERNAL MEDICINE

## 2021-11-15 PROCEDURE — 3078F DIAST BP <80 MM HG: CPT | Mod: HCNC,CPTII,S$GLB, | Performed by: INTERNAL MEDICINE

## 2021-11-15 PROCEDURE — 3078F PR MOST RECENT DIASTOLIC BLOOD PRESSURE < 80 MM HG: ICD-10-PCS | Mod: HCNC,CPTII,S$GLB, | Performed by: INTERNAL MEDICINE

## 2021-11-15 PROCEDURE — 3044F PR MOST RECENT HEMOGLOBIN A1C LEVEL <7.0%: ICD-10-PCS | Mod: HCNC,CPTII,S$GLB, | Performed by: INTERNAL MEDICINE

## 2021-11-15 PROCEDURE — 99215 OFFICE O/P EST HI 40 MIN: CPT | Mod: 25,HCNC,S$GLB, | Performed by: INTERNAL MEDICINE

## 2021-11-15 PROCEDURE — 94729 PR C02/MEMBANE DIFFUSE CAPACITY: ICD-10-PCS | Mod: HCNC,S$GLB,, | Performed by: INTERNAL MEDICINE

## 2021-11-15 PROCEDURE — 99215 PR OFFICE/OUTPT VISIT, EST, LEVL V, 40-54 MIN: ICD-10-PCS | Mod: 25,HCNC,S$GLB, | Performed by: INTERNAL MEDICINE

## 2021-11-15 PROCEDURE — 1159F MED LIST DOCD IN RCRD: CPT | Mod: HCNC,CPTII,S$GLB, | Performed by: INTERNAL MEDICINE

## 2021-11-15 PROCEDURE — 99999 PR PBB SHADOW E&M-EST. PATIENT-LVL I: CPT | Mod: PBBFAC,HCNC,,

## 2021-11-15 PROCEDURE — 71046 X-RAY EXAM CHEST 2 VIEWS: CPT | Mod: TC,HCNC

## 2021-11-15 PROCEDURE — 99999 PR PBB SHADOW E&M-EST. PATIENT-LVL V: CPT | Mod: PBBFAC,HCNC,, | Performed by: INTERNAL MEDICINE

## 2021-11-15 PROCEDURE — 3008F BODY MASS INDEX DOCD: CPT | Mod: HCNC,CPTII,S$GLB, | Performed by: INTERNAL MEDICINE

## 2021-11-15 PROCEDURE — 94618 PULMONARY STRESS TESTING: ICD-10-PCS | Mod: HCNC,S$GLB,, | Performed by: INTERNAL MEDICINE

## 2021-11-15 PROCEDURE — 94618 PULMONARY STRESS TESTING: CPT | Mod: HCNC,S$GLB,, | Performed by: INTERNAL MEDICINE

## 2021-11-15 PROCEDURE — 94010 BREATHING CAPACITY TEST: ICD-10-PCS | Mod: HCNC,S$GLB,, | Performed by: INTERNAL MEDICINE

## 2021-11-15 PROCEDURE — 71046 X-RAY EXAM CHEST 2 VIEWS: CPT | Mod: 26,HCNC,, | Performed by: RADIOLOGY

## 2021-11-15 PROCEDURE — 3075F PR MOST RECENT SYSTOLIC BLOOD PRESS GE 130-139MM HG: ICD-10-PCS | Mod: HCNC,CPTII,S$GLB, | Performed by: INTERNAL MEDICINE

## 2021-11-15 PROCEDURE — 1159F PR MEDICATION LIST DOCUMENTED IN MEDICAL RECORD: ICD-10-PCS | Mod: HCNC,CPTII,S$GLB, | Performed by: INTERNAL MEDICINE

## 2021-11-15 PROCEDURE — 3044F HG A1C LEVEL LT 7.0%: CPT | Mod: HCNC,CPTII,S$GLB, | Performed by: INTERNAL MEDICINE

## 2021-11-15 PROCEDURE — 71046 XR CHEST PA AND LATERAL: ICD-10-PCS | Mod: 26,HCNC,, | Performed by: RADIOLOGY

## 2021-11-15 PROCEDURE — 3008F PR BODY MASS INDEX (BMI) DOCUMENTED: ICD-10-PCS | Mod: HCNC,CPTII,S$GLB, | Performed by: INTERNAL MEDICINE

## 2021-11-15 PROCEDURE — 99999 PR PBB SHADOW E&M-EST. PATIENT-LVL V: ICD-10-PCS | Mod: PBBFAC,HCNC,, | Performed by: INTERNAL MEDICINE

## 2021-11-15 PROCEDURE — 99999 PR PBB SHADOW E&M-EST. PATIENT-LVL I: ICD-10-PCS | Mod: PBBFAC,HCNC,,

## 2021-11-16 LAB
BRPFT: NORMAL
DLCO ADJ PRE: 10.72 ML/(MIN*MMHG)
DLCO SINGLE BREATH LLN: 16.97
DLCO SINGLE BREATH PRE REF: 47.2 %
DLCO SINGLE BREATH REF: 22.71
DLCOC SBVA LLN: 3.1
DLCOC SBVA PRE REF: 72.6 %
DLCOC SBVA REF: 4.57
DLCOC SINGLE BREATH LLN: 16.97
DLCOC SINGLE BREATH PRE REF: 47.2 %
DLCOC SINGLE BREATH REF: 22.71
DLCOVA LLN: 3.1
DLCOVA PRE REF: 72.6 %
DLCOVA PRE: 3.32 ML/(MIN*MMHG*L)
DLCOVA REF: 4.57
DLVAADJ PRE: 3.32 ML/(MIN*MMHG*L)
FEF 25 75 LLN: 1.12
FEF 25 75 PRE REF: 87.1 %
FEF 25 75 REF: 2.22
FEV1 FVC LLN: 67
FEV1 FVC PRE REF: 101.4 %
FEV1 FVC REF: 79
FEV1 LLN: 1.86
FEV1 PRE REF: 74.4 %
FEV1 REF: 2.47
FVC LLN: 2.37
FVC PRE REF: 72.9 %
FVC REF: 3.15
IVC PRE: 2.33 L
IVC SINGLE BREATH LLN: 2.37
IVC SINGLE BREATH PRE REF: 74.1 %
IVC SINGLE BREATH REF: 3.15
PEF LLN: 4.54
PEF PRE REF: 81.5 %
PEF REF: 6.26
PRE DLCO: 10.72 ML/(MIN*MMHG)
PRE FEF 25 75: 1.94 L/S
PRE FET 100: 8.31 SEC
PRE FEV1 FVC: 80.25 %
PRE FEV1: 1.84 L
PRE FVC: 2.29 L
PRE PEF: 5.11 L/S
VA PRE: 3.23 L
VA SINGLE BREATH LLN: 4.82
VA SINGLE BREATH PRE REF: 67.1 %
VA SINGLE BREATH REF: 4.82

## 2022-01-18 DIAGNOSIS — J67.9 HYPERSENSITIVITY PNEUMONITIS: Chronic | ICD-10-CM

## 2022-01-18 RX ORDER — FLUTICASONE FUROATE AND VILANTEROL TRIFENATATE 100; 25 UG/1; UG/1
1 POWDER RESPIRATORY (INHALATION) DAILY
Qty: 30 EACH | Refills: 11 | Status: SHIPPED | OUTPATIENT
Start: 2022-01-18 | End: 2023-02-27

## 2022-01-27 ENCOUNTER — TELEPHONE (OUTPATIENT)
Dept: NEUROLOGY | Facility: CLINIC | Age: 62
End: 2022-01-27
Payer: MEDICARE

## 2022-01-27 DIAGNOSIS — G43.009 MIGRAINE WITHOUT AURA AND WITHOUT STATUS MIGRAINOSUS, NOT INTRACTABLE: Primary | ICD-10-CM

## 2022-01-27 NOTE — TELEPHONE ENCOUNTER
----- Message from Joe Mac sent at 1/27/2022  2:39 PM CST -----  Contact: self [phone]  Pt requesting a call back to schedule Botox appt.    Please call and advise

## 2022-02-14 ENCOUNTER — TELEPHONE (OUTPATIENT)
Dept: NEUROLOGY | Facility: CLINIC | Age: 62
End: 2022-02-14
Payer: MEDICARE

## 2022-02-14 NOTE — TELEPHONE ENCOUNTER
----- Message from Divina Parson sent at 2/14/2022 10:11 AM CST -----  Contact: Serene  Patient would like a call back at [phone] in regards to her appointment that she has today. She would like to check on the insurance approval before her appointment.    Thanks

## 2022-02-27 ENCOUNTER — PATIENT MESSAGE (OUTPATIENT)
Dept: PULMONOLOGY | Facility: CLINIC | Age: 62
End: 2022-02-27
Payer: MEDICARE

## 2022-02-28 ENCOUNTER — TELEPHONE (OUTPATIENT)
Dept: PULMONOLOGY | Facility: CLINIC | Age: 62
End: 2022-02-28
Payer: MEDICARE

## 2022-02-28 ENCOUNTER — PATIENT MESSAGE (OUTPATIENT)
Dept: PULMONOLOGY | Facility: CLINIC | Age: 62
End: 2022-02-28
Payer: MEDICARE

## 2022-02-28 DIAGNOSIS — J98.4 CRLD (CHRONIC RESTRICTIVE LUNG DISEASE): Primary | ICD-10-CM

## 2022-02-28 DIAGNOSIS — J98.4 CRLD (CHRONIC RESTRICTIVE LUNG DISEASE): ICD-10-CM

## 2022-02-28 DIAGNOSIS — J96.11 CHRONIC RESPIRATORY FAILURE WITH HYPOXIA: Primary | ICD-10-CM

## 2022-03-01 ENCOUNTER — TELEPHONE (OUTPATIENT)
Dept: NEUROLOGY | Facility: CLINIC | Age: 62
End: 2022-03-01
Payer: MEDICARE

## 2022-03-01 NOTE — TELEPHONE ENCOUNTER
----- Message from Norma Barcenas sent at 3/1/2022  4:46 PM CST -----  Pt would like a call back in regards to her procedure, she wants to know if her procedure was approved by the insurance. Please call her at .[phone] and if so you canreply onteh chart as well.

## 2022-03-04 ENCOUNTER — OFFICE VISIT (OUTPATIENT)
Dept: OPHTHALMOLOGY | Facility: CLINIC | Age: 62
End: 2022-03-04
Payer: MEDICARE

## 2022-03-04 DIAGNOSIS — H52.4 MYOPIA WITH ASTIGMATISM AND PRESBYOPIA, BILATERAL: ICD-10-CM

## 2022-03-04 DIAGNOSIS — H52.13 MYOPIA WITH ASTIGMATISM AND PRESBYOPIA, BILATERAL: ICD-10-CM

## 2022-03-04 DIAGNOSIS — H25.013 CORTICAL AGE-RELATED CATARACT OF BOTH EYES: ICD-10-CM

## 2022-03-04 DIAGNOSIS — H52.203 MYOPIA WITH ASTIGMATISM AND PRESBYOPIA, BILATERAL: ICD-10-CM

## 2022-03-04 DIAGNOSIS — H25.13 NUCLEAR SCLEROSIS, BILATERAL: Primary | ICD-10-CM

## 2022-03-04 PROCEDURE — 1160F PR REVIEW ALL MEDS BY PRESCRIBER/CLIN PHARMACIST DOCUMENTED: ICD-10-PCS | Mod: HCNC,CPTII,S$GLB, | Performed by: OPTOMETRIST

## 2022-03-04 PROCEDURE — 92004 COMPRE OPH EXAM NEW PT 1/>: CPT | Mod: HCNC,S$GLB,, | Performed by: OPTOMETRIST

## 2022-03-04 PROCEDURE — 99999 PR PBB SHADOW E&M-EST. PATIENT-LVL II: ICD-10-PCS | Mod: PBBFAC,HCNC,, | Performed by: OPTOMETRIST

## 2022-03-04 PROCEDURE — 1159F PR MEDICATION LIST DOCUMENTED IN MEDICAL RECORD: ICD-10-PCS | Mod: HCNC,CPTII,S$GLB, | Performed by: OPTOMETRIST

## 2022-03-04 PROCEDURE — 92004 PR EYE EXAM, NEW PATIENT,COMPREHESV: ICD-10-PCS | Mod: HCNC,S$GLB,, | Performed by: OPTOMETRIST

## 2022-03-04 PROCEDURE — 1159F MED LIST DOCD IN RCRD: CPT | Mod: HCNC,CPTII,S$GLB, | Performed by: OPTOMETRIST

## 2022-03-04 PROCEDURE — 92015 DETERMINE REFRACTIVE STATE: CPT | Mod: HCNC,S$GLB,, | Performed by: OPTOMETRIST

## 2022-03-04 PROCEDURE — 92015 PR REFRACTION: ICD-10-PCS | Mod: HCNC,S$GLB,, | Performed by: OPTOMETRIST

## 2022-03-04 PROCEDURE — 1160F RVW MEDS BY RX/DR IN RCRD: CPT | Mod: HCNC,CPTII,S$GLB, | Performed by: OPTOMETRIST

## 2022-03-04 PROCEDURE — 99999 PR PBB SHADOW E&M-EST. PATIENT-LVL II: CPT | Mod: PBBFAC,HCNC,, | Performed by: OPTOMETRIST

## 2022-03-04 NOTE — PROGRESS NOTES
HPI     Annual Exam     Comments: Vision changes since last eye exam?: yes, trouble seeing the   tv/reading   Any eye pain today: no  Other ocular symptoms: none, other than tunnel vision with migraines   Interested in contact lens fitting today? yes            Last edited by Xenia George MA on 3/4/2022  2:51 PM. (History)            Assessment /Plan     For exam results, see Encounter Report.    Nuclear sclerosis, bilateral  Cortical age-related cataract of both eyes  Cataracts not significantly affecting activities of daily living and therefore surgery is not indicated at this time.   Will continue to monitor over the next 12 months. Pt to call or RTC with any significant change in vision prior to next visit.     Myopia with astigmatism and presbyopia, bilateral  Eyeglass Final Rx     Eyeglass Final Rx       Sphere Cylinder Axis Add    Right -1.75 +0.50 060 +2.25    Left -1.50 +0.25 180 +2.25    Expiration Date: 3/4/2023                RTC next available for contact lens fitting, discussed monovision

## 2022-03-08 ENCOUNTER — PATIENT MESSAGE (OUTPATIENT)
Dept: PHARMACY | Facility: CLINIC | Age: 62
End: 2022-03-08
Payer: MEDICARE

## 2022-03-08 ENCOUNTER — PROCEDURE VISIT (OUTPATIENT)
Dept: NEUROLOGY | Facility: CLINIC | Age: 62
End: 2022-03-08
Payer: MEDICARE

## 2022-03-08 VITALS
HEIGHT: 64 IN | OXYGEN SATURATION: 97 % | RESPIRATION RATE: 16 BRPM | DIASTOLIC BLOOD PRESSURE: 80 MMHG | WEIGHT: 242.94 LBS | SYSTOLIC BLOOD PRESSURE: 128 MMHG | BODY MASS INDEX: 41.48 KG/M2 | HEART RATE: 84 BPM

## 2022-03-08 DIAGNOSIS — G43.009 MIGRAINE WITHOUT AURA AND WITHOUT STATUS MIGRAINOSUS, NOT INTRACTABLE: Primary | Chronic | ICD-10-CM

## 2022-03-08 PROCEDURE — 64615 CHEMODENERV MUSC MIGRAINE: CPT | Mod: S$GLB,,, | Performed by: PSYCHIATRY & NEUROLOGY

## 2022-03-08 PROCEDURE — 64615 PR CHEMODENERVATION OF MUSCLE FOR CHRONIC MIGRAINE: ICD-10-PCS | Mod: S$GLB,,, | Performed by: PSYCHIATRY & NEUROLOGY

## 2022-03-08 RX ORDER — UBROGEPANT 100 MG/1
100 TABLET ORAL ONCE AS NEEDED
Qty: 16 TABLET | Refills: 2 | Status: SHIPPED | OUTPATIENT
Start: 2022-03-08 | End: 2022-06-03

## 2022-03-08 RX ORDER — GABAPENTIN 300 MG/1
300 CAPSULE ORAL NIGHTLY
Qty: 90 CAPSULE | Refills: 3 | Status: SHIPPED | OUTPATIENT
Start: 2022-03-08 | End: 2022-10-14 | Stop reason: SDUPTHER

## 2022-03-08 NOTE — PROCEDURES
1. PROCEDURE: Botox injections      2. INDICATION: Intractable Migraine      3. TIME OUT: The procedure was discussed in details with the patient and the consent form was signed. The patient verbalized understanding of the procedure . I discussed the risks that may include serious immune reaction and distant spread that could results in loss of breathing. Other side effects may include droopy eyelids, trouble swallowing and cardiac arrhythmias. It was also stressed that it is contraindicated in pregnancy.      3. COURSE:   The standard protocol was followed. the procedure was very smooth.      4. COMPLICATIONS: None. The patient tolerated the procedure very well.      5. AFTERCARE: I encouraged the patient to use ice if the sites of injection get tender and call me with any problems or complications.               As per the standard protocol, a total 155 units were injected in 31 sites.            RT  5 units in 1 site     LT  5 units in 1 site      Procerus 5 units in 1 site      RT Frontalis 10 units in 2 sites      LT Frontalis 10 units in 2 site      RT Temporalis 20 units in 4 sites     LT Temporalis 20 units in 4 sites     RT Occipitalis 15 units in 3 sites     LT Occipitalis 15  units in 3 sites      RT Cervical Paraspinals 10 units in 2 sites     LT Cervical Paraspinals 10 units in 2 sites     RT Trapezius 15 units in 3 sites     LT Trapezius 15 units in 3 sites         Per the standard of care protocol we use 155 units and waste 45 units. I gave the patient the option of following the standard protocol vs.using the extra 45 units to target areas where the pain is maximum which could potentially provide extra help with headache control. I explained to the patient that this will be an off-label use, not the standard protocol, not evidence-based and could result in more pronounced side effects. The patient verbalized full understanding of all the facts and the risks and benefits and  elected to use the extra 45 units for the following sites:        RT  10 units in 2 sites     LT  10 units in 2 sites      Procerus 5 units in 1 site      RT Frontalis 10 units in 2 sites      LT Frontalis 10 units in 2 site           ADDENDUM-MEDICATION MANAGEMENT       Changed Nurtec to Ubrelvy due to cost.    Filled out forms for Emgality Rx.    Try  mg QHS.    May try Namenda 10 mg BID down the road

## 2022-03-16 ENCOUNTER — PATIENT MESSAGE (OUTPATIENT)
Dept: NEUROLOGY | Facility: CLINIC | Age: 62
End: 2022-03-16
Payer: MEDICARE

## 2022-03-16 DIAGNOSIS — G43.009 MIGRAINE WITHOUT AURA AND WITHOUT STATUS MIGRAINOSUS, NOT INTRACTABLE: Primary | ICD-10-CM

## 2022-03-16 RX ORDER — ONDANSETRON 4 MG/1
4 TABLET, ORALLY DISINTEGRATING ORAL ONCE AS NEEDED
Qty: 10 TABLET | Refills: 3 | Status: SHIPPED | OUTPATIENT
Start: 2022-03-16 | End: 2022-03-27

## 2022-03-17 ENCOUNTER — PATIENT MESSAGE (OUTPATIENT)
Dept: NEUROLOGY | Facility: CLINIC | Age: 62
End: 2022-03-17
Payer: MEDICARE

## 2022-03-21 ENCOUNTER — TELEPHONE (OUTPATIENT)
Dept: NEUROSURGERY | Facility: CLINIC | Age: 62
End: 2022-03-21
Payer: MEDICARE

## 2022-03-21 NOTE — TELEPHONE ENCOUNTER
I RETURNED THE PT CALL REGARDING SCHEDULING WITH    ----- Message from Yelitza Monroe sent at 3/21/2022  1:36 PM CDT -----  Contact: pt  Pt requesting call back re: removing a neuro stimulator. Referred by Dr. Carballo in [place].     Confirmed contact below:  Contact Name:Serene Rod  Phone Number: [phone]    Multiple attempts

## 2022-03-22 ENCOUNTER — TELEPHONE (OUTPATIENT)
Dept: NEUROLOGY | Facility: CLINIC | Age: 62
End: 2022-03-22
Payer: MEDICARE

## 2022-03-22 ENCOUNTER — TELEPHONE (OUTPATIENT)
Dept: NEUROSURGERY | Facility: CLINIC | Age: 62
End: 2022-03-22
Payer: MEDICARE

## 2022-03-22 NOTE — TELEPHONE ENCOUNTER
----- Message from Gus Soto sent at 3/22/2022  2:59 PM CDT -----  Contact: pt  Who Called: PT  Regarding: The pt pt is returning the nurse call for scheduling. Please contact the pt.   Would the patient rather a call back or a response via [facility]? Call back  Best Call Back Number: [phone]  Additional Information:

## 2022-03-22 NOTE — TELEPHONE ENCOUNTER
----- Message from Josefa Malone sent at 3/22/2022  3:14 PM CDT -----  .Type:  Needs Medical Advice    Who Called: RYLEY LEBRON [[id number]]  Symptoms (please be specific):   How long has patient had these symptoms:   Pharmacy name and phone #:      [facility] @ (phone) [phone]     Would the patient rather a call back or a response via [facility]?  Best Call Back Number:  [phone]  Additional Information:  pt is requesting a call from office regarding rx galcanezumab-gnlm (EMGALITY PEN) 120 mg/mL PnIj. Please call pharmacy and pt.

## 2022-03-23 ENCOUNTER — PATIENT MESSAGE (OUTPATIENT)
Dept: NEUROLOGY | Facility: CLINIC | Age: 62
End: 2022-03-23
Payer: MEDICARE

## 2022-03-28 ENCOUNTER — TELEPHONE (OUTPATIENT)
Dept: PHARMACY | Facility: CLINIC | Age: 62
End: 2022-03-28
Payer: MEDICARE

## 2022-03-31 ENCOUNTER — TELEPHONE (OUTPATIENT)
Dept: PULMONOLOGY | Facility: CLINIC | Age: 62
End: 2022-03-31
Payer: MEDICARE

## 2022-04-04 ENCOUNTER — PATIENT MESSAGE (OUTPATIENT)
Dept: PULMONOLOGY | Facility: CLINIC | Age: 62
End: 2022-04-04
Payer: MEDICARE

## 2022-04-05 ENCOUNTER — OFFICE VISIT (OUTPATIENT)
Dept: NEUROSURGERY | Facility: CLINIC | Age: 62
End: 2022-04-05
Payer: MEDICARE

## 2022-04-05 DIAGNOSIS — Z96.82 PRESENCE OF NEUROSTIMULATOR: ICD-10-CM

## 2022-04-05 PROCEDURE — 99205 PR OFFICE/OUTPT VISIT, NEW, LEVL V, 60-74 MIN: ICD-10-PCS | Mod: 95,,, | Performed by: NEUROLOGICAL SURGERY

## 2022-04-05 PROCEDURE — 99499 RISK ADDL DX/OHS AUDIT: ICD-10-PCS | Mod: 95,,, | Performed by: NEUROLOGICAL SURGERY

## 2022-04-05 PROCEDURE — 99499 UNLISTED E&M SERVICE: CPT | Mod: 95,,, | Performed by: NEUROLOGICAL SURGERY

## 2022-04-05 PROCEDURE — 99205 OFFICE O/P NEW HI 60 MIN: CPT | Mod: 95,,, | Performed by: NEUROLOGICAL SURGERY
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
## 2022-04-06 ENCOUNTER — PATIENT MESSAGE (OUTPATIENT)
Dept: NEUROSURGERY | Facility: CLINIC | Age: 62
End: 2022-04-06
Payer: MEDICARE

## 2022-04-13 ENCOUNTER — TELEPHONE (OUTPATIENT)
Dept: NEUROSURGERY | Facility: CLINIC | Age: 62
End: 2022-04-13
Payer: MEDICARE

## 2022-04-13 NOTE — TELEPHONE ENCOUNTER
----- Message from Dieter Robb sent at 4/13/2022 10:34 AM CDT -----  Contact: RYLEY LEBRON [[id number]]  .Type:  Needs Medical Advice    Who Called: RYLEY LEBRON [[id number]]  Symptoms (please be specific):  How long has patient had these symptoms:  Pharmacy name and phone #:   Would the patient rather a call back or a response via My [facility]?  Call   Best Call Back Number:  [phone] (home)   Additional Information: Pt is requesting  a call back from the nurse in regards to the pt knowing if  ANASTACIO Stevens Has referred her to  the Dr Foley

## 2022-04-13 NOTE — TELEPHONE ENCOUNTER
Returned pt call, in regards to getting her scheduled pt did not answer left vm for pt to return phone call.

## 2022-04-25 ENCOUNTER — TELEPHONE (OUTPATIENT)
Dept: NEUROSURGERY | Facility: CLINIC | Age: 62
End: 2022-04-25
Payer: MEDICARE

## 2022-04-25 NOTE — TELEPHONE ENCOUNTER
Attempted to contact pt per  to see if pt can come in early morning or sooner than current appt time. Call was unsuccessful, lvm for pt

## 2022-04-26 ENCOUNTER — OFFICE VISIT (OUTPATIENT)
Dept: NEUROSURGERY | Facility: CLINIC | Age: 62
End: 2022-04-26
Payer: MEDICARE

## 2022-04-26 VITALS
HEIGHT: 64 IN | WEIGHT: 242 LBS | RESPIRATION RATE: 16 BRPM | HEART RATE: 78 BPM | BODY MASS INDEX: 41.32 KG/M2 | DIASTOLIC BLOOD PRESSURE: 87 MMHG | SYSTOLIC BLOOD PRESSURE: 142 MMHG

## 2022-04-26 DIAGNOSIS — Z96.82 PRESENCE OF NEUROSTIMULATOR: ICD-10-CM

## 2022-04-26 DIAGNOSIS — G43.009 MIGRAINE WITHOUT AURA AND WITHOUT STATUS MIGRAINOSUS, NOT INTRACTABLE: Chronic | ICD-10-CM

## 2022-04-26 DIAGNOSIS — Z01.818 PRE-OP TESTING: Primary | ICD-10-CM

## 2022-04-26 DIAGNOSIS — T85.192A FAILED SPINAL CORD STIMULATOR, INITIAL ENCOUNTER: ICD-10-CM

## 2022-04-26 PROCEDURE — 3079F PR MOST RECENT DIASTOLIC BLOOD PRESSURE 80-89 MM HG: ICD-10-PCS | Mod: CPTII,S$GLB,, | Performed by: NEUROLOGICAL SURGERY

## 2022-04-26 PROCEDURE — 3008F PR BODY MASS INDEX (BMI) DOCUMENTED: ICD-10-PCS | Mod: CPTII,S$GLB,, | Performed by: NEUROLOGICAL SURGERY

## 2022-04-26 PROCEDURE — 1160F RVW MEDS BY RX/DR IN RCRD: CPT | Mod: CPTII,S$GLB,, | Performed by: NEUROLOGICAL SURGERY

## 2022-04-26 PROCEDURE — 3079F DIAST BP 80-89 MM HG: CPT | Mod: CPTII,S$GLB,, | Performed by: NEUROLOGICAL SURGERY

## 2022-04-26 PROCEDURE — 99215 OFFICE O/P EST HI 40 MIN: CPT | Mod: S$GLB,,, | Performed by: NEUROLOGICAL SURGERY

## 2022-04-26 PROCEDURE — 99215 PR OFFICE/OUTPT VISIT, EST, LEVL V, 40-54 MIN: ICD-10-PCS | Mod: S$GLB,,, | Performed by: NEUROLOGICAL SURGERY

## 2022-04-26 PROCEDURE — 1159F PR MEDICATION LIST DOCUMENTED IN MEDICAL RECORD: ICD-10-PCS | Mod: CPTII,S$GLB,, | Performed by: NEUROLOGICAL SURGERY

## 2022-04-26 PROCEDURE — 3008F BODY MASS INDEX DOCD: CPT | Mod: CPTII,S$GLB,, | Performed by: NEUROLOGICAL SURGERY

## 2022-04-26 PROCEDURE — 3077F PR MOST RECENT SYSTOLIC BLOOD PRESSURE >= 140 MM HG: ICD-10-PCS | Mod: CPTII,S$GLB,, | Performed by: NEUROLOGICAL SURGERY

## 2022-04-26 PROCEDURE — 99999 PR PBB SHADOW E&M-EST. PATIENT-LVL V: ICD-10-PCS | Mod: PBBFAC,,, | Performed by: NEUROLOGICAL SURGERY

## 2022-04-26 PROCEDURE — 3077F SYST BP >= 140 MM HG: CPT | Mod: CPTII,S$GLB,, | Performed by: NEUROLOGICAL SURGERY

## 2022-04-26 PROCEDURE — 1159F MED LIST DOCD IN RCRD: CPT | Mod: CPTII,S$GLB,, | Performed by: NEUROLOGICAL SURGERY

## 2022-04-26 PROCEDURE — 99999 PR PBB SHADOW E&M-EST. PATIENT-LVL V: CPT | Mod: PBBFAC,,, | Performed by: NEUROLOGICAL SURGERY

## 2022-04-26 PROCEDURE — 1160F PR REVIEW ALL MEDS BY PRESCRIBER/CLIN PHARMACIST DOCUMENTED: ICD-10-PCS | Mod: CPTII,S$GLB,, | Performed by: NEUROLOGICAL SURGERY

## 2022-04-26 RX ORDER — MUPIROCIN 20 MG/G
1 OINTMENT TOPICAL 2 TIMES DAILY
Status: CANCELLED | OUTPATIENT
Start: 2022-04-26 | End: 2022-04-27

## 2022-04-26 RX ORDER — MUPIROCIN 20 MG/G
OINTMENT TOPICAL
Status: CANCELLED | OUTPATIENT
Start: 2022-04-26

## 2022-04-26 NOTE — PROGRESS NOTES
Subjective:      Patient ID: Serene Rod is a 61 y.o. female.    Chief Complaint: Follow-up (Pt is here for neurostimulatr removal and is 0/10 in pain. Pt states that when she is in pain it is a migrane from stress and weather. Pt states that she has had no treatments prior.)      HPI 61-year-old female with the long history of chronic migraine headaches who had previous migraine neurostimulator placed in [facility].  It became malfunctioning loss she is visiting in [place] and she had replaced then the neural stimulators have stopped working again and she would like to have the stimulator is removed.  She reports having severe headaches and her neurologists would like to be able to get some brain MRIs and cervical MRIs so we plan to remove her non working stimulators.  She denies any new numbness tingling or weakness no double vision no nausea or vomiting at this time    Review of Systems   Respiratory: Positive for shortness of breath.    Neurological: Positive for headaches.   All other systems reviewed and are negative.          Objective:   Assessment    [unfilled]  Physical Exam:  Nursing note and vitals reviewed.    Eyes: Conjunctivae are normal.     Cardiovascular: Normal rate and regular rhythm.     Abdominal: Soft. Bowel sounds are normal.     Psych/Behavior: She is alert. She is oriented to person, place, and time.     Neurological:   Patient is awake alert orient x3 speech clear and fluent cranial nerves are intact patient has palpable supraorbital leads underneath the skin wrapping around to the years and then back along the posterior suboccipital area she has got multiple scars where the leads were placed bilaterally around the ear as well as the base of the skull periscapular area lower lumbar area and then into the right gluteal region where her generator is located     General    Nursing note and vitals reviewed.  Constitutional: She is oriented to person, place, and time. She is cooperative.        HENT:   Head: Normocephalic.       Eyes: Conjunctivae and lids are normal. Lids are everted and swept, no foreign bodies found.   Cardiovascular: Normal rate and regular rhythm.    Pulmonary/Chest: Tachypnea noted. Decreased air movement is present.   Abdominal: Soft. Normal appearance and bowel sounds are normal.   Neurological: She is alert and oriented to person, place, and time. She has normal motor skills, normal sensation, normal reflexes and intact cranial nerves. Gait and coordination normal. GCS eye subscore is 4. GCS verbal subscore is 5. GCS motor subscore is 6.   Patient is awake alert orient x3 speech clear and fluent cranial nerves are intact patient has palpable supraorbital leads underneath the skin wrapping around to the years and then back along the posterior suboccipital area she has got multiple scars where the leads were placed bilaterally around the ear as well as the base of the skull periscapular area lower lumbar area and then into the right gluteal region where her generator is located             Physical Exam  Vitals and nursing note reviewed.   Constitutional:       General: She is awake.      Appearance: Normal appearance. She is overweight.       HENT:      Head: Normocephalic.        Comments: Patient has multiple incisions around the ears bilaterally as well as in the suboccipital area and the right periscapular area lower lumbar area and then the right buttocks where leads have been placed in past for her migraine stimulator  Eyes:      General: Lids are normal. Lids are everted, no foreign bodies appreciated. Vision grossly intact.      Extraocular Movements: Extraocular movements intact.      Conjunctiva/sclera: Conjunctivae normal.   Cardiovascular:      Rate and Rhythm: Normal rate and regular rhythm.   Pulmonary:      Effort: Tachypnea present.      Breath sounds: Decreased air movement present.   Abdominal:      General: Abdomen is flat. Bowel sounds are normal.       Palpations: Abdomen is soft.   Musculoskeletal:      Cervical back: Normal range of motion. No pain with movement. Normal range of motion.   Neurological:      Mental Status: She is alert and oriented to person, place, and time.      GCS: GCS eye subscore is 4. GCS verbal subscore is 5. GCS motor subscore is 6.      Cranial Nerves: Cranial nerves are intact.      Sensory: Sensation is intact.      Motor: Motor function is intact.      Coordination: Coordination is intact.      Gait: Gait is intact.      Deep Tendon Reflexes: Reflexes are normal and symmetric.      Comments: Patient is awake alert orient x3 speech clear and fluent cranial nerves are intact patient has palpable supraorbital leads underneath the skin wrapping around to the years and then back along the posterior suboccipital area she has got multiple scars where the leads were placed bilaterally around the ear as well as the base of the skull periscapular area lower lumbar area and then into the right gluteal region where her generator is located   Psychiatric:         Behavior: Behavior is cooperative.                   Imaging:  I reviewed patient's previous head CT which shows supraorbital migraine leads as well as wires tracking down to her generator in the right gluteal pocket  I have personally reviewed the patients neuroimaging. The above description is a summary of pertinant findings, but is not all inclusive. Additional information maybe found in the radiology interpretation. These findings will be discussed with the patient or other revelant members of the care team.   I  reviewed all pertinent imaging regarding this case.  Assessment:   1. Failed migraine stimulator nonfunctioning patient would like to have it removed.  I discussed the risk associated with removing these amended agreement to have it removed so that she can get future MRIs.  I have explained to the patient that these are very unusual and although I have removed many  stimulator leads this will be my 1st migraine stimulator lead removal.  She still wishes to go ahead and proceed with the surgery I explained to her it will probably after the reason size several of her incision sites to be able to safely remove all of the leads and the generator    1. Migraine without aura and without status migrainosus, not intractable    2. Presence of neurostimulator    3. Failed spinal cord stimulator, initial encounter      Plan:   Patient will be scheduled for surgery on May 12th to undergo exploration and removal of her migraine stimulator.  I will obtain skull x-rays as well as spinal x-rays to trace all of the leads and generator preoperatively  Migraine without aura and without status migrainosus, not intractable  -     X-Ray Skull Complete Min 4 Views; Future; Expected date: 04/26/2022  -     X-Ray Spine Survey AP And Lateral; Future; Expected date: 04/26/2022    Presence of neurostimulator  -     X-Ray Skull Complete Min 4 Views; Future; Expected date: 04/26/2022  -     X-Ray Spine Survey AP And Lateral; Future; Expected date: 04/26/2022    Failed spinal cord stimulator, initial encounter  -     X-Ray Skull Complete Min 4 Views; Future; Expected date: 04/26/2022  -     X-Ray Spine Survey AP And Lateral; Future; Expected date: 04/26/2022          More than 45 minutes of the time spent in counseling and coordination of care including discussions etiology of diagnosis, pathogenesis of diagnosis, prognosis of diagnosis,, diagnostic results, impression and recommendations, diagnostic studies, management, risks and benefits of treatment, instructions of disease self-management, treatment instructions, follow up requirements, patient and family counseling/involvement in care compliance with treatment regimen. All of the patient's and/or family members questions were answered during this discussion.       Disclaimer: This note was prepared using a voice recognition system and is likely to have  sound alike errors within the text.     Thank you for the referral   Please call with any questions    Teo Brown MD  Neurosurgery

## 2022-04-27 ENCOUNTER — TELEPHONE (OUTPATIENT)
Dept: PULMONOLOGY | Facility: CLINIC | Age: 62
End: 2022-04-27
Payer: MEDICARE

## 2022-04-27 DIAGNOSIS — J96.11 CHRONIC RESPIRATORY FAILURE WITH HYPOXIA: Primary | ICD-10-CM

## 2022-04-28 ENCOUNTER — PATIENT MESSAGE (OUTPATIENT)
Dept: NEUROSURGERY | Facility: CLINIC | Age: 62
End: 2022-04-28
Payer: MEDICARE

## 2022-04-28 ENCOUNTER — PATIENT MESSAGE (OUTPATIENT)
Dept: PULMONOLOGY | Facility: CLINIC | Age: 62
End: 2022-04-28
Payer: MEDICARE

## 2022-05-02 ENCOUNTER — LAB VISIT (OUTPATIENT)
Dept: PRIMARY CARE CLINIC | Facility: CLINIC | Age: 62
End: 2022-05-02
Payer: MEDICARE

## 2022-05-02 DIAGNOSIS — J96.11 CHRONIC RESPIRATORY FAILURE WITH HYPOXIA: ICD-10-CM

## 2022-05-02 PROCEDURE — U0005 INFEC AGEN DETEC AMPLI PROBE: HCPCS | Performed by: INTERNAL MEDICINE

## 2022-05-02 PROCEDURE — U0003 INFECTIOUS AGENT DETECTION BY NUCLEIC ACID (DNA OR RNA); SEVERE ACUTE RESPIRATORY SYNDROME CORONAVIRUS 2 (SARS-COV-2) (CORONAVIRUS DISEASE [COVID-19]), AMPLIFIED PROBE TECHNIQUE, MAKING USE OF HIGH THROUGHPUT TECHNOLOGIES AS DESCRIBED BY CMS-2020-01-R: HCPCS | Performed by: INTERNAL MEDICINE

## 2022-05-03 LAB
SARS-COV-2 RNA RESP QL NAA+PROBE: NOT DETECTED
SARS-COV-2- CYCLE NUMBER: NORMAL

## 2022-05-04 ENCOUNTER — PATIENT MESSAGE (OUTPATIENT)
Dept: NEUROLOGY | Facility: CLINIC | Age: 62
End: 2022-05-04
Payer: MEDICARE

## 2022-05-04 RX ORDER — ONDANSETRON 4 MG/1
4 TABLET, ORALLY DISINTEGRATING ORAL ONCE AS NEEDED
Qty: 15 TABLET | Refills: 3 | Status: SHIPPED | OUTPATIENT
Start: 2022-05-04 | End: 2022-05-04 | Stop reason: SDUPTHER

## 2022-05-04 RX ORDER — ONDANSETRON 4 MG/1
4 TABLET, ORALLY DISINTEGRATING ORAL ONCE AS NEEDED
Qty: 15 TABLET | Refills: 3 | Status: SHIPPED | OUTPATIENT
Start: 2022-05-04 | End: 2022-08-27

## 2022-05-05 ENCOUNTER — TELEPHONE (OUTPATIENT)
Dept: NEUROSURGERY | Facility: CLINIC | Age: 62
End: 2022-05-05
Payer: MEDICARE

## 2022-05-05 ENCOUNTER — HOSPITAL ENCOUNTER (OUTPATIENT)
Dept: RADIOLOGY | Facility: HOSPITAL | Age: 62
Discharge: HOME | End: 2022-05-05
Attending: NEUROLOGICAL SURGERY
Payer: MEDICARE

## 2022-05-05 ENCOUNTER — CLINICAL SUPPORT (OUTPATIENT)
Dept: PULMONOLOGY | Facility: CLINIC | Age: 62
End: 2022-05-05
Payer: MEDICARE

## 2022-05-05 ENCOUNTER — HOSPITAL ENCOUNTER (OUTPATIENT)
Dept: CARDIOLOGY | Facility: HOSPITAL | Age: 62
Discharge: HOME | End: 2022-05-05
Attending: NEUROLOGICAL SURGERY
Payer: MEDICARE

## 2022-05-05 VITALS — BODY MASS INDEX: 40.88 KG/M2 | WEIGHT: 239.44 LBS | HEIGHT: 64 IN

## 2022-05-05 DIAGNOSIS — G43.009 MIGRAINE WITHOUT AURA AND WITHOUT STATUS MIGRAINOSUS, NOT INTRACTABLE: ICD-10-CM

## 2022-05-05 DIAGNOSIS — T85.192A FAILED SPINAL CORD STIMULATOR, INITIAL ENCOUNTER: ICD-10-CM

## 2022-05-05 DIAGNOSIS — Z01.818 PRE-OP TESTING: ICD-10-CM

## 2022-05-05 DIAGNOSIS — J98.4 CRLD (CHRONIC RESTRICTIVE LUNG DISEASE): ICD-10-CM

## 2022-05-05 DIAGNOSIS — Z96.82 PRESENCE OF NEUROSTIMULATOR: ICD-10-CM

## 2022-05-05 DIAGNOSIS — J96.11 CHRONIC RESPIRATORY FAILURE WITH HYPOXIA: ICD-10-CM

## 2022-05-05 PROCEDURE — 93010 ELECTROCARDIOGRAM REPORT: CPT | Mod: ,,, | Performed by: INTERNAL MEDICINE

## 2022-05-05 PROCEDURE — 94010 BREATHING CAPACITY TEST: CPT | Mod: S$GLB,,, | Performed by: INTERNAL MEDICINE

## 2022-05-05 PROCEDURE — 72082 X-RAY EXAM ENTIRE SPI 2/3 VW: CPT | Mod: TC

## 2022-05-05 PROCEDURE — 99999 PR PBB SHADOW E&M-EST. PATIENT-LVL I: CPT | Mod: PBBFAC,,,

## 2022-05-05 PROCEDURE — 93005 ELECTROCARDIOGRAM TRACING: CPT

## 2022-05-05 PROCEDURE — 94618 PULMONARY STRESS TESTING: ICD-10-PCS | Mod: S$GLB,,, | Performed by: INTERNAL MEDICINE

## 2022-05-05 PROCEDURE — 71046 X-RAY EXAM CHEST 2 VIEWS: CPT | Mod: TC

## 2022-05-05 PROCEDURE — 72082 X-RAY EXAM ENTIRE SPI 2/3 VW: CPT | Mod: 26,,, | Performed by: RADIOLOGY

## 2022-05-05 PROCEDURE — 71046 XR CHEST PA AND LATERAL PRE-OP: ICD-10-PCS | Mod: 26,,, | Performed by: RADIOLOGY

## 2022-05-05 PROCEDURE — 94010 BREATHING CAPACITY TEST: ICD-10-PCS | Mod: S$GLB,,, | Performed by: INTERNAL MEDICINE

## 2022-05-05 PROCEDURE — 70260 XR SKULL COMPLETE MIN 4 VIEWS: ICD-10-PCS | Mod: 26,,, | Performed by: RADIOLOGY

## 2022-05-05 PROCEDURE — 70260 X-RAY EXAM OF SKULL: CPT | Mod: TC

## 2022-05-05 PROCEDURE — 93010 EKG 12-LEAD: ICD-10-PCS | Mod: ,,, | Performed by: INTERNAL MEDICINE

## 2022-05-05 PROCEDURE — 99999 PR PBB SHADOW E&M-EST. PATIENT-LVL I: ICD-10-PCS | Mod: PBBFAC,,,

## 2022-05-05 PROCEDURE — 70260 X-RAY EXAM OF SKULL: CPT | Mod: 26,,, | Performed by: RADIOLOGY

## 2022-05-05 PROCEDURE — 72082 XR SPINE SURVEY AP AND LATERAL: ICD-10-PCS | Mod: 26,,, | Performed by: RADIOLOGY

## 2022-05-05 PROCEDURE — 94618 PULMONARY STRESS TESTING: CPT | Mod: S$GLB,,, | Performed by: INTERNAL MEDICINE

## 2022-05-05 PROCEDURE — 71046 X-RAY EXAM CHEST 2 VIEWS: CPT | Mod: 26,,, | Performed by: RADIOLOGY

## 2022-05-05 NOTE — PROCEDURES
"[facility]  Six Minute Walk     SUMMARY     Ordering Provider:    Interpreting Provider:   Performing nurse/tech/RT: Oriana RRT  Diagnosis:  (Chronic Restrictive Lung Diseases)  Height: 5' 4" (162.6 cm)  Weight: 108.6 kg (239 lb 6.7 oz)  BMI (Calculated): 41.1   Patient Race:             Phase Oxygen Assessment Supplemental O2 Heart   Rate Blood Pressure Sophie Dyspnea Scale Rating   Resting 87 % (placed on 2L, SpO2 improved to 95%) Room Air 84 bpm 142/80 0   Exercise        Minute        1 87 % (increased to 3L (88%) then 4L (92%)) 2 L/M 116 bpm     2 92 % 4 L/M 127 bpm     3 92 % 4 L/M 126 bpm     4 90 % 4 L/M 139 bpm     5 91 % 4 L/M 139 bpm     6  90 % 4 L/M 138 bpm 181/80 3   Recovery        Minute        1 93 % 4 L/M 131 bpm     2 96 % 4 L/M 105 bpm     3 96 % 4 L/M 96 bpm     4 96 % 4 L/M 94 bpm 142/79 0     Six Minute Walk Summary  6MWT Status: completed without stopping  Patient Reported: Dyspnea     Interpretation:  Did the patient stop or pause?: No                                         Total Time Walked (Calculated): 360 seconds  Final Partial Lap Distance (feet): 100 feet  Total Distance Meters (Calculated): 213.36 meters  Predicted Distance Meters (Calculated): 408.81 meters  Percentage of Predicted (Calculated): 52.19  Peak VO2 (Calculated): 10.38  Mets: 2.97  Has The Patient Had a Previous Six Minute Walk Test?: Yes       Previous 6MWT Results  Has The Patient Had a Previous Six Minute Walk Test?: Yes  Date of Previous Test: 11/15/21  Total Time Walked: 360 seconds  Total Distance (meters): 266.7  Predicted Distance (meters): 401.03 meters  Percentage of Predicted: 66.5  Percent Change (Calculated): 0.2    "

## 2022-05-05 NOTE — TELEPHONE ENCOUNTER
Spoke w/ pt in regards to moving her appt date to Monday instead of Tuesday, pt agreed and requested to come in at a later time

## 2022-05-06 ENCOUNTER — PATIENT MESSAGE (OUTPATIENT)
Dept: NEUROSURGERY | Facility: CLINIC | Age: 62
End: 2022-05-06
Payer: MEDICARE

## 2022-05-06 LAB
BRPFT: NORMAL
FEF 25 75 LLN: 1.11
FEF 25 75 PRE REF: 66.1 %
FEF 25 75 REF: 2.2
FEV1 FVC LLN: 67
FEV1 FVC PRE REF: 97.3 %
FEV1 FVC REF: 79
FEV1 LLN: 1.85
FEV1 PRE REF: 70 %
FEV1 REF: 2.46
FVC LLN: 2.36
FVC PRE REF: 71.4 %
FVC REF: 3.13
PEF LLN: 4.54
PEF PRE REF: 91.8 %
PEF REF: 6.26
PRE FEF 25 75: 1.46 L/S
PRE FET 100: 8.55 SEC
PRE FEV1 FVC: 76.98 %
PRE FEV1: 1.72 L
PRE FVC: 2.24 L
PRE PEF: 5.75 L/S

## 2022-05-09 ENCOUNTER — PATIENT MESSAGE (OUTPATIENT)
Dept: NEUROLOGY | Facility: CLINIC | Age: 62
End: 2022-05-09
Payer: MEDICARE

## 2022-05-09 ENCOUNTER — IMMUNIZATION (OUTPATIENT)
Dept: PHARMACY | Facility: CLINIC | Age: 62
End: 2022-05-09
Payer: MEDICARE

## 2022-05-09 ENCOUNTER — CLINICAL SUPPORT (OUTPATIENT)
Dept: PULMONOLOGY | Facility: CLINIC | Age: 62
End: 2022-05-09
Payer: MEDICARE

## 2022-05-09 DIAGNOSIS — J96.11 CHRONIC RESPIRATORY FAILURE WITH HYPOXIA: ICD-10-CM

## 2022-05-09 DIAGNOSIS — Z23 NEED FOR VACCINATION: Primary | ICD-10-CM

## 2022-05-09 LAB
ALLENS TEST: ABNORMAL
DELSYS: ABNORMAL
FIO2: 21
HCO3 UR-SCNC: 18.5 MMOL/L (ref 24–28)
MODE: ABNORMAL
PCO2 BLDA: 34.3 MMHG (ref 35–45)
PH SMN: 7.34 [PH] (ref 7.35–7.45)
PO2 BLDA: 64 MMHG (ref 80–100)
POC BE: -7 MMOL/L
POC SATURATED O2: 91 % (ref 95–100)
SAMPLE: ABNORMAL
SITE: ABNORMAL

## 2022-05-09 PROCEDURE — 36600 PR WITHDRAWAL OF ARTERIAL BLOOD: ICD-10-PCS | Mod: S$GLB,,, | Performed by: INTERNAL MEDICINE

## 2022-05-09 PROCEDURE — 82803 PR  BLOOD GASES: PH, PO2 & PCO2: ICD-10-PCS | Mod: S$GLB,,, | Performed by: INTERNAL MEDICINE

## 2022-05-09 PROCEDURE — 82803 BLOOD GASES ANY COMBINATION: CPT | Mod: S$GLB,,, | Performed by: INTERNAL MEDICINE

## 2022-05-09 PROCEDURE — 36600 WITHDRAWAL OF ARTERIAL BLOOD: CPT | Mod: S$GLB,,, | Performed by: INTERNAL MEDICINE

## 2022-05-13 ENCOUNTER — TELEPHONE (OUTPATIENT)
Dept: INTERNAL MEDICINE | Facility: CLINIC | Age: 62
End: 2022-05-13
Payer: MEDICARE

## 2022-05-13 ENCOUNTER — PATIENT MESSAGE (OUTPATIENT)
Dept: NEUROSURGERY | Facility: CLINIC | Age: 62
End: 2022-05-13
Payer: MEDICARE

## 2022-05-13 ENCOUNTER — PATIENT MESSAGE (OUTPATIENT)
Dept: INTERNAL MEDICINE | Facility: CLINIC | Age: 62
End: 2022-05-13
Payer: MEDICARE

## 2022-05-13 NOTE — TELEPHONE ENCOUNTER
----- Message from Dieter Robb sent at 5/13/2022  8:49 AM CDT -----  Contact: RYLEY LEBRON [[id number]]  .Type:  Sooner Apoointment Request    Caller is requesting a sooner appointment.  Caller declined first available appointment listed below.  Caller will not accept being placed on the waitlist and is requesting a message be sent to doctor.  Name of Caller:RYLEY LEBRON [[id number]]  When is the first available appointment? 05/23/2022  Symptoms:surgery clearance  Would the patient rather a call back or a response via My [facility]?  call  Best Call Back Number: [phone] (home)    Additional Information:

## 2022-05-16 ENCOUNTER — OFFICE VISIT (OUTPATIENT)
Dept: NEUROSURGERY | Facility: CLINIC | Age: 62
End: 2022-05-16
Payer: MEDICARE

## 2022-05-16 ENCOUNTER — LAB VISIT (OUTPATIENT)
Dept: PRIMARY CARE CLINIC | Facility: CLINIC | Age: 62
End: 2022-05-16
Payer: MEDICARE

## 2022-05-16 ENCOUNTER — PATIENT MESSAGE (OUTPATIENT)
Dept: SURGERY | Facility: HOSPITAL | Age: 62
End: 2022-05-16
Payer: MEDICARE

## 2022-05-16 VITALS
BODY MASS INDEX: 40.65 KG/M2 | HEART RATE: 111 BPM | SYSTOLIC BLOOD PRESSURE: 187 MMHG | DIASTOLIC BLOOD PRESSURE: 97 MMHG | WEIGHT: 238.13 LBS | RESPIRATION RATE: 18 BRPM | HEIGHT: 64 IN

## 2022-05-16 DIAGNOSIS — T85.192A FAILED SPINAL CORD STIMULATOR, INITIAL ENCOUNTER: Primary | ICD-10-CM

## 2022-05-16 DIAGNOSIS — Z01.818 PRE-OP TESTING: ICD-10-CM

## 2022-05-16 PROCEDURE — U0005 INFEC AGEN DETEC AMPLI PROBE: HCPCS | Performed by: NEUROLOGICAL SURGERY

## 2022-05-16 PROCEDURE — 1160F PR REVIEW ALL MEDS BY PRESCRIBER/CLIN PHARMACIST DOCUMENTED: ICD-10-PCS | Mod: CPTII,S$GLB,, | Performed by: NEUROLOGICAL SURGERY

## 2022-05-16 PROCEDURE — 3008F BODY MASS INDEX DOCD: CPT | Mod: CPTII,S$GLB,, | Performed by: NEUROLOGICAL SURGERY

## 2022-05-16 PROCEDURE — 1159F PR MEDICATION LIST DOCUMENTED IN MEDICAL RECORD: ICD-10-PCS | Mod: CPTII,S$GLB,, | Performed by: NEUROLOGICAL SURGERY

## 2022-05-16 PROCEDURE — 99499 RISK ADDL DX/OHS AUDIT: ICD-10-PCS | Mod: S$GLB,,, | Performed by: NEUROLOGICAL SURGERY

## 2022-05-16 PROCEDURE — 3077F SYST BP >= 140 MM HG: CPT | Mod: CPTII,S$GLB,, | Performed by: NEUROLOGICAL SURGERY

## 2022-05-16 PROCEDURE — 99999 PR PBB SHADOW E&M-EST. PATIENT-LVL IV: ICD-10-PCS | Mod: PBBFAC,,, | Performed by: NEUROLOGICAL SURGERY

## 2022-05-16 PROCEDURE — 1159F MED LIST DOCD IN RCRD: CPT | Mod: CPTII,S$GLB,, | Performed by: NEUROLOGICAL SURGERY

## 2022-05-16 PROCEDURE — 3008F PR BODY MASS INDEX (BMI) DOCUMENTED: ICD-10-PCS | Mod: CPTII,S$GLB,, | Performed by: NEUROLOGICAL SURGERY

## 2022-05-16 PROCEDURE — 99499 UNLISTED E&M SERVICE: CPT | Mod: S$GLB,,, | Performed by: NEUROLOGICAL SURGERY

## 2022-05-16 PROCEDURE — 99213 PR OFFICE/OUTPT VISIT, EST, LEVL III, 20-29 MIN: ICD-10-PCS | Mod: S$GLB,,, | Performed by: NEUROLOGICAL SURGERY

## 2022-05-16 PROCEDURE — 3077F PR MOST RECENT SYSTOLIC BLOOD PRESSURE >= 140 MM HG: ICD-10-PCS | Mod: CPTII,S$GLB,, | Performed by: NEUROLOGICAL SURGERY

## 2022-05-16 PROCEDURE — 3080F DIAST BP >= 90 MM HG: CPT | Mod: CPTII,S$GLB,, | Performed by: NEUROLOGICAL SURGERY

## 2022-05-16 PROCEDURE — U0003 INFECTIOUS AGENT DETECTION BY NUCLEIC ACID (DNA OR RNA); SEVERE ACUTE RESPIRATORY SYNDROME CORONAVIRUS 2 (SARS-COV-2) (CORONAVIRUS DISEASE [COVID-19]), AMPLIFIED PROBE TECHNIQUE, MAKING USE OF HIGH THROUGHPUT TECHNOLOGIES AS DESCRIBED BY CMS-2020-01-R: HCPCS | Performed by: NEUROLOGICAL SURGERY

## 2022-05-16 PROCEDURE — 3080F PR MOST RECENT DIASTOLIC BLOOD PRESSURE >= 90 MM HG: ICD-10-PCS | Mod: CPTII,S$GLB,, | Performed by: NEUROLOGICAL SURGERY

## 2022-05-16 PROCEDURE — 1160F RVW MEDS BY RX/DR IN RCRD: CPT | Mod: CPTII,S$GLB,, | Performed by: NEUROLOGICAL SURGERY

## 2022-05-16 PROCEDURE — 99213 OFFICE O/P EST LOW 20 MIN: CPT | Mod: S$GLB,,, | Performed by: NEUROLOGICAL SURGERY

## 2022-05-16 PROCEDURE — 99999 PR PBB SHADOW E&M-EST. PATIENT-LVL IV: CPT | Mod: PBBFAC,,, | Performed by: NEUROLOGICAL SURGERY

## 2022-05-16 RX ORDER — CEPHALEXIN 500 MG/1
500 CAPSULE ORAL EVERY 6 HOURS
Qty: 28 CAPSULE | Refills: 1 | Status: SHIPPED | OUTPATIENT
Start: 2022-05-16 | End: 2022-05-23

## 2022-05-16 RX ORDER — METHOCARBAMOL 750 MG/1
750 TABLET, FILM COATED ORAL 3 TIMES DAILY
Qty: 60 TABLET | Refills: 3 | Status: SHIPPED | OUTPATIENT
Start: 2022-05-16

## 2022-05-16 RX ORDER — OXYCODONE AND ACETAMINOPHEN 5; 325 MG/1; MG/1
1-2 TABLET ORAL EVERY 6 HOURS PRN
Qty: 56 TABLET | Refills: 0 | Status: SHIPPED | OUTPATIENT
Start: 2022-05-16 | End: 2022-08-01

## 2022-05-16 NOTE — H&P (VIEW-ONLY)
Subjective:      Patient ID: Serene Rod is a 61 y.o. female.    Chief Complaint: Pre-op Exam (Pt is here today for Pre-op for Brain Stim Removal.. Pt stated she's 0/10 pain)    HPI 61-year-old female with a history of dorsal column stimulator placed for headaches this been revised in still not operation on patient would like to have it removed  Review of Systems       Objective:       Neurosurgery Physical Exam  Ortho/SPM Exam    Nursing note and vitals reviewed  Gen:Oriented to person, place, and time.             Appears stated age   Head:Normocephalic and atraumatic.  Nose: Nose normal.    Eyes: EOM are normal. Pupils are equal, round, and reactive to light.   Neck: Neck supple. No masses or lesions palpated well-healed incision palpable leads with incisions behind the ears and at the base of the neck  Spine previous scars from dose comes stimulator placement  Cardiovascular: Intact distal pulses.    Abdominal: Soft.   Neurological: Alert and oriented to person, place, and time.  No cranial nerve deficit.  Coordination normal. Normal muscle tone  Psychiatric: Normal mood and affect. Behavior is normal.        I  reviewed all pertinent imaging regarding this case.  Patient has bilateral cranial leads extending back to the retroauricular area into the neck as well as down through the back    I have personally reviewed the patients neuroimaging. The above description is a summary of pertinant findings, but is not all inclusive. Additional information maybe found in the radiology interpretation. These findings will be discussed with the patient or other revelant members of the care team.       Assessment:     1. Failed spinal cord stimulator, initial encounter      Plan:   Plan to perform dorsal column stimulator removal this will require multiple incisions including behind the ears base of the neck then along the back to remove all the generator as well as all of the leads we discussed risks and benefits associated  with this with the patient she understands this she has been given her postop medications to  preoperatively  Failed spinal cord stimulator, initial encounter  -     cephALEXin (KEFLEX) 500 MG capsule; Take 1 capsule (500 mg total) by mouth every 6 (six) hours. for 7 days  Dispense: 28 capsule; Refill: 1  -     methocarbamoL (ROBAXIN) 750 MG Tab; Take 1 tablet (750 mg total) by mouth 3 (three) times daily.  Dispense: 60 tablet; Refill: 3  -     oxyCODONE-acetaminophen (PERCOCET) 5-325 mg per tablet; Take 1-2 tablets by mouth every 6 (six) hours as needed for Pain.  Dispense: 56 tablet; Refill: 0        More than 30 minutes of the time spent in counseling and coordination of care including discussions etiology of diagnosis, pathogenesis of diagnosis, prognosis of diagnosis,, diagnostic results, impression and recommendations, diagnostic studies, management, risks and benefits of treatment, instructions of disease self-management, treatment instructions, follow up requirements, patient and family counseling/involvement in care compliance with treatment regimen. All of the patient's and/or family members questions were answered during this discussion.       I discussed treatment options for the patients condition including surgical as well as non surgical options. The patient was informed of all benefits and potential risk of the operation including but not limited to:  Pain, nerve damage, spinal cord injury, neurologic injury, stroke, infection, bleeding, vascular injury, coma, paralysis, death.  In addition, Cerebrospinal fluid leak, failure of any instrumentation, malpositioned hardware and the need for additional procedures in the future including reoperation. No guarantee was given that this procedure would alleviate all of the symptoms or pain. Patient's questions were addressed.      Disclaimer: This note was prepared using a voice recognition system and is likely to have sound alike errors within the  text.   Thank you for the referral   Please call with any questions    Teo Brown MD  Neurosurgery   [facility]

## 2022-05-17 ENCOUNTER — OFFICE VISIT (OUTPATIENT)
Dept: INTERNAL MEDICINE | Facility: CLINIC | Age: 62
End: 2022-05-17
Payer: MEDICARE

## 2022-05-17 ENCOUNTER — LAB VISIT (OUTPATIENT)
Dept: LAB | Facility: HOSPITAL | Age: 62
End: 2022-05-17
Attending: NEUROLOGICAL SURGERY
Payer: MEDICARE

## 2022-05-17 ENCOUNTER — TELEPHONE (OUTPATIENT)
Dept: NEUROSURGERY | Facility: CLINIC | Age: 62
End: 2022-05-17
Payer: MEDICARE

## 2022-05-17 VITALS
DIASTOLIC BLOOD PRESSURE: 90 MMHG | BODY MASS INDEX: 41.4 KG/M2 | HEIGHT: 64 IN | OXYGEN SATURATION: 94 % | SYSTOLIC BLOOD PRESSURE: 148 MMHG | TEMPERATURE: 99 F | WEIGHT: 242.5 LBS | HEART RATE: 103 BPM

## 2022-05-17 DIAGNOSIS — J67.9 HYPERSENSITIVITY PNEUMONITIS: ICD-10-CM

## 2022-05-17 DIAGNOSIS — D69.9 BLEEDING DISORDER: ICD-10-CM

## 2022-05-17 DIAGNOSIS — J98.4 CRLD (CHRONIC RESTRICTIVE LUNG DISEASE): Chronic | ICD-10-CM

## 2022-05-17 DIAGNOSIS — G43.009 MIGRAINE WITHOUT AURA AND WITHOUT STATUS MIGRAINOSUS, NOT INTRACTABLE: Chronic | ICD-10-CM

## 2022-05-17 DIAGNOSIS — Z96.82 PRESENCE OF NEUROSTIMULATOR: Primary | ICD-10-CM

## 2022-05-17 DIAGNOSIS — E66.01 CLASS 3 SEVERE OBESITY DUE TO EXCESS CALORIES WITHOUT SERIOUS COMORBIDITY WITH BODY MASS INDEX (BMI) OF 40.0 TO 44.9 IN ADULT: ICD-10-CM

## 2022-05-17 DIAGNOSIS — R73.9 HYPERGLYCEMIA: ICD-10-CM

## 2022-05-17 DIAGNOSIS — Z79.52 LONG TERM CURRENT USE OF SYSTEMIC STEROIDS: Chronic | ICD-10-CM

## 2022-05-17 DIAGNOSIS — D84.9 IMMUNOSUPPRESSED STATUS: ICD-10-CM

## 2022-05-17 DIAGNOSIS — E78.5 HYPERLIPIDEMIA, UNSPECIFIED HYPERLIPIDEMIA TYPE: ICD-10-CM

## 2022-05-17 DIAGNOSIS — T85.192A FAILED SPINAL CORD STIMULATOR, INITIAL ENCOUNTER: ICD-10-CM

## 2022-05-17 DIAGNOSIS — I10 ESSENTIAL HYPERTENSION: Chronic | ICD-10-CM

## 2022-05-17 DIAGNOSIS — D69.9 BLEEDING DISORDER: Primary | ICD-10-CM

## 2022-05-17 DIAGNOSIS — Z01.818 PRE-OP TESTING: ICD-10-CM

## 2022-05-17 LAB
APTT BLDCRRT: 23.6 SEC (ref 21–32)
INR PPP: 0.9 (ref 0.8–1.2)
PROTHROMBIN TIME: 10.6 SEC (ref 9–12.5)
SARS-COV-2 RNA RESP QL NAA+PROBE: NOT DETECTED

## 2022-05-17 PROCEDURE — 36415 COLL VENOUS BLD VENIPUNCTURE: CPT | Performed by: NEUROLOGICAL SURGERY

## 2022-05-17 PROCEDURE — 1159F MED LIST DOCD IN RCRD: CPT | Mod: CPTII,S$GLB,, | Performed by: PEDIATRICS

## 2022-05-17 PROCEDURE — 1160F PR REVIEW ALL MEDS BY PRESCRIBER/CLIN PHARMACIST DOCUMENTED: ICD-10-PCS | Mod: CPTII,S$GLB,, | Performed by: PEDIATRICS

## 2022-05-17 PROCEDURE — 85610 PROTHROMBIN TIME: CPT | Performed by: NEUROLOGICAL SURGERY

## 2022-05-17 PROCEDURE — 3077F PR MOST RECENT SYSTOLIC BLOOD PRESSURE >= 140 MM HG: ICD-10-PCS | Mod: CPTII,S$GLB,, | Performed by: PEDIATRICS

## 2022-05-17 PROCEDURE — 3008F PR BODY MASS INDEX (BMI) DOCUMENTED: ICD-10-PCS | Mod: CPTII,S$GLB,, | Performed by: PEDIATRICS

## 2022-05-17 PROCEDURE — 99214 PR OFFICE/OUTPT VISIT, EST, LEVL IV, 30-39 MIN: ICD-10-PCS | Mod: S$GLB,,, | Performed by: PEDIATRICS

## 2022-05-17 PROCEDURE — 1159F PR MEDICATION LIST DOCUMENTED IN MEDICAL RECORD: ICD-10-PCS | Mod: CPTII,S$GLB,, | Performed by: PEDIATRICS

## 2022-05-17 PROCEDURE — 99499 UNLISTED E&M SERVICE: CPT | Mod: S$GLB,,, | Performed by: PEDIATRICS

## 2022-05-17 PROCEDURE — 99214 OFFICE O/P EST MOD 30 MIN: CPT | Mod: S$GLB,,, | Performed by: PEDIATRICS

## 2022-05-17 PROCEDURE — 1160F RVW MEDS BY RX/DR IN RCRD: CPT | Mod: CPTII,S$GLB,, | Performed by: PEDIATRICS

## 2022-05-17 PROCEDURE — 3080F DIAST BP >= 90 MM HG: CPT | Mod: CPTII,S$GLB,, | Performed by: PEDIATRICS

## 2022-05-17 PROCEDURE — 3008F BODY MASS INDEX DOCD: CPT | Mod: CPTII,S$GLB,, | Performed by: PEDIATRICS

## 2022-05-17 PROCEDURE — 3080F PR MOST RECENT DIASTOLIC BLOOD PRESSURE >= 90 MM HG: ICD-10-PCS | Mod: CPTII,S$GLB,, | Performed by: PEDIATRICS

## 2022-05-17 PROCEDURE — 3077F SYST BP >= 140 MM HG: CPT | Mod: CPTII,S$GLB,, | Performed by: PEDIATRICS

## 2022-05-17 PROCEDURE — 99999 PR PBB SHADOW E&M-EST. PATIENT-LVL V: CPT | Mod: PBBFAC,,, | Performed by: PEDIATRICS

## 2022-05-17 PROCEDURE — 85730 THROMBOPLASTIN TIME PARTIAL: CPT | Performed by: NEUROLOGICAL SURGERY

## 2022-05-17 PROCEDURE — 99499 RISK ADDL DX/OHS AUDIT: ICD-10-PCS | Mod: S$GLB,,, | Performed by: PEDIATRICS

## 2022-05-17 PROCEDURE — 99999 PR PBB SHADOW E&M-EST. PATIENT-LVL V: ICD-10-PCS | Mod: PBBFAC,,, | Performed by: PEDIATRICS

## 2022-05-17 RX ORDER — VERAPAMIL HYDROCHLORIDE 240 MG/1
240 TABLET, FILM COATED, EXTENDED RELEASE ORAL DAILY
Qty: 90 TABLET | Refills: 3 | Status: SHIPPED | OUTPATIENT
Start: 2022-05-17 | End: 2023-07-06 | Stop reason: SDUPTHER

## 2022-05-17 NOTE — PROGRESS NOTES
Subjective:       Patient ID: Serene Rod is a 61 y.o. female.    Chief Complaint: Pre-op Exam    Here for preop clearance for migraine neurostimulator removal by Dr Brown.    PMH/PSH/SH/FH reviewed with patient and Epic, notable for HTN that has been elevated without htnive symptoms for several months and chronic lung disease on chronic steroids, cellcept,  and O2.    Review of Systems   Constitutional: Negative for fever and unexpected weight change.   HENT: Negative for congestion and rhinorrhea.    Eyes: Negative for discharge and redness.   Respiratory: Positive for cough and shortness of breath. Negative for wheezing.    Cardiovascular: Negative for chest pain, palpitations and leg swelling.   Gastrointestinal: Negative for constipation, diarrhea and vomiting.   Genitourinary: Negative for decreased urine volume, difficulty urinating and menstrual problem.   Musculoskeletal: Negative for arthralgias and joint swelling.   Skin: Negative for rash and wound.   Neurological: Positive for headaches. Negative for syncope.   Psychiatric/Behavioral: Negative for behavioral problems and sleep disturbance.       Objective:      Physical Exam  Vitals and nursing note reviewed.   Constitutional:       General: She is not in acute distress.     Appearance: She is well-developed.   Neck:      Thyroid: No thyromegaly.      Vascular: No JVD.   Cardiovascular:      Rate and Rhythm: Normal rate and regular rhythm.      Heart sounds: Normal heart sounds. No murmur heard.  Pulmonary:      Effort: Pulmonary effort is normal. No respiratory distress.      Breath sounds: Normal breath sounds. No wheezing or rales.   Abdominal:      General: There is no distension.      Palpations: Abdomen is soft. There is no mass.      Tenderness: There is no abdominal tenderness. There is no guarding.   Musculoskeletal:      Right lower leg: No edema.      Left lower leg: No edema.   Lymphadenopathy:      Cervical: No cervical adenopathy.    Skin:     Capillary Refill: Capillary refill takes less than 2 seconds.      Findings: No rash.   Neurological:      General: No focal deficit present.      Mental Status: She is alert and oriented to person, place, and time.      Cranial Nerves: No cranial nerve deficit.      Coordination: Coordination normal.   Psychiatric:         Mood and Affect: Mood normal.         Behavior: Behavior normal.         Thought Content: Thought content normal.         Judgment: Judgment normal.         Assessment:       1. Presence of neurostimulator    2. Migraine without aura and without status migrainosus, not intractable    3. Essential hypertension    4. Hyperlipidemia, unspecified hyperlipidemia type    5. CRLD (chronic restrictive lung disease)    6. Class 3 severe obesity due to excess calories without serious comorbidity with body mass index (BMI) of 40.0 to 44.9 in adult    7. Chronic Hypersensitivity Pneumonitis    8. Hyperglycemia    9. Immunosuppressed status    10. Long term current use of systemic corticosteroids        Plan:       Presence of neurostimulator    Migraine without aura and without status migrainosus, not intractable  -     verapamiL (CALAN-SR) 240 MG CR tablet; Take 1 tablet (240 mg total) by mouth once daily.  Dispense: 90 tablet; Refill: 3    Essential hypertension  -     verapamiL (CALAN-SR) 240 MG CR tablet; Take 1 tablet (240 mg total) by mouth once daily.  Dispense: 90 tablet; Refill: 3  -     Hypertension Digital Medicine ([facility]) Enrollment Order  -     Hypertension Digital Medicine ([facility]): Assign Onboarding Questionnaires    Hyperlipidemia, unspecified hyperlipidemia type    CRLD (chronic restrictive lung disease)    Class 3 severe obesity due to excess calories without serious comorbidity with body mass index (BMI) of 40.0 to 44.9 in adult    Chronic Hypersensitivity Pneumonitis    Hyperglycemia    Immunosuppressed status    Long term current use of systemic corticosteroids    Preop labs  reviewed with patient. Increase verapamil to 240 mg and enroll in Dig HTN med. Ok for surgery, I would like her to take 20 mg prednisone day of surgery and the following day due to her chronic 10 mg use. She has pulmonary consult tomorrow. Note to Dr Brown

## 2022-05-17 NOTE — PRE ADMISSION SCREENING
Pre op instructions reviewed with pt per phone: Spoke about pre op process and surgery instructions, verbalized understanding.    Surgery is scheduled on 5/19/22. Please arrive at 0920am. We will call you the afternoon prior to surgery to confirm arrival time, as it is subject to change due to cancellations & emergencies.    Please report to the [facility] (1st Floor) at [facility] located off of [facility] (2nd building on the left, in front of the flag pole).  Address: [address] [place] [address] [place] [id number]        INSTRUCTIONS IMPORTANT!!!  Do Not Eat, Drink, or Smoke after 12 midnight! NO WATER after midnight! OK to brush teeth, no gum, candy or mints!      *Take only these medicines with a small swallow of water-morning of surgery.  wellbutrin  lexapro  Inhaler  topamax  flonase  cellcept  Prednisone    ____  NO Acrylic/fake nails or nail polish worn day of surgery (specifically hand/arm & foot surgeries).  ____  NO powder, lotions, deodorants, oils or creams on body.  ____  Please Remove All jewelry & piercings prior to surgery.  ____  Please Remove Dentures, Hearing Aids & Contact Lens prior to the start of surgery.  ____  Please bring photo ID and insurance information to hospital (Leave Valuables at Home).  ____  If going home the same day, arrange for a ride home. You will not be able to drive 24 hrs if Anesthesia was used.   ____  Females (ages 11-60) may need to give a urine sample the morning of surgery; please see Pre op Nurse prior to voiding.  ____  Wear clean, loose fitting clothing. Allow for dressings, bandages.  ____  Stop all Aspirin products, Ibuprofen, Advil, Motrin & Aleve at least 5-7 days before surgery, unless otherwise instructed by your doctor, or the nurse.   ____  Blood Thinners are stopped based on your Provider's recommendation; Call Surgeon's Office to inquire when to stop/hold.  ____  Stop taking any Fish Oil supplements or Vitamins at least 5 days prior to surgery, unless  instructed otherwise by your Doctor.            Diabetic Patients: If you take diabetic medication, do NOT take morning of surgery unless instructed by             Doctor. Metformin to be stopped 24 hrs prior to surgery time. DO NOT take long-acting insulin the evening before surgery. Blood sugars will be checked in pre-op morning of.    Bathing Instructions:    -Do not shave your face or body the day before or the day of surgery.  -Do not shave pubic hair 7 days prior to surgery (gyn pt's).   -Shower & Rinse your body as usual with anti-bacterial Soap (Dial, Lever 2000, or Hibiclens)   -Do not use Hibiclens on your head, face, or genitals.   -Do not wash with anti-bacterial soap after you use the Hibiclens.   -Rinse your body thoroughly.      [facility] Visitor/Ride Policy:  Only 2 adults allowed (over the age of 18) to accompany you to the Hospital. You Must have a ride home from a responsible adult that you know and trust. Medical Transport, Uber or Lyft can only be used if patient has a responsible adult to accompany them during ride home.    Post-Op Instructions: You will receive Post-op/Discharge instructions by your Discharge Nurse prior to going home. Please call your Surgeon's office with any post-surgery questions/concerns.    *Call [facility] Pre-Admissions Department with surgery instruction questions @ [phone] or [phone] (Mon-Fri 7:30a to 3:45p)  *If you are running late or have questions the morning of surgery, please call the Surgery Dept @ [phone]  *Insurance/ Financial Questions, please call [phone].

## 2022-05-18 ENCOUNTER — LAB VISIT (OUTPATIENT)
Dept: LAB | Facility: HOSPITAL | Age: 62
End: 2022-05-18
Attending: NEUROLOGICAL SURGERY
Payer: MEDICARE

## 2022-05-18 ENCOUNTER — PATIENT MESSAGE (OUTPATIENT)
Dept: PULMONOLOGY | Facility: CLINIC | Age: 62
End: 2022-05-18

## 2022-05-18 ENCOUNTER — TELEPHONE (OUTPATIENT)
Dept: PREADMISSION TESTING | Facility: HOSPITAL | Age: 62
End: 2022-05-18
Payer: MEDICARE

## 2022-05-18 ENCOUNTER — OFFICE VISIT (OUTPATIENT)
Dept: PULMONOLOGY | Facility: CLINIC | Age: 62
End: 2022-05-18
Payer: MEDICARE

## 2022-05-18 VITALS
OXYGEN SATURATION: 94 % | RESPIRATION RATE: 14 BRPM | BODY MASS INDEX: 41.1 KG/M2 | HEIGHT: 64 IN | WEIGHT: 240.75 LBS | DIASTOLIC BLOOD PRESSURE: 90 MMHG | SYSTOLIC BLOOD PRESSURE: 140 MMHG | HEART RATE: 73 BPM

## 2022-05-18 DIAGNOSIS — Z79.899 HIGH RISK MEDICATION USE: ICD-10-CM

## 2022-05-18 DIAGNOSIS — J96.11 CHRONIC RESPIRATORY FAILURE WITH HYPOXIA: ICD-10-CM

## 2022-05-18 DIAGNOSIS — Z01.811 PREOP PULMONARY/RESPIRATORY EXAM: Primary | ICD-10-CM

## 2022-05-18 DIAGNOSIS — J67.9 HYPERSENSITIVITY PNEUMONITIS: ICD-10-CM

## 2022-05-18 DIAGNOSIS — Z79.899 ON CELLCEPT THERAPY: ICD-10-CM

## 2022-05-18 DIAGNOSIS — J98.4 CRLD (CHRONIC RESTRICTIVE LUNG DISEASE): ICD-10-CM

## 2022-05-18 DIAGNOSIS — Z01.818 PRE-OP TESTING: ICD-10-CM

## 2022-05-18 DIAGNOSIS — J98.4 RESTRICTIVE LUNG DISEASE: ICD-10-CM

## 2022-05-18 DIAGNOSIS — Z79.52 CURRENT CHRONIC USE OF SYSTEMIC STEROIDS: ICD-10-CM

## 2022-05-18 LAB
ABO + RH BLD: NORMAL
BLD GP AB SCN CELLS X3 SERPL QL: NORMAL

## 2022-05-18 PROCEDURE — 3077F PR MOST RECENT SYSTOLIC BLOOD PRESSURE >= 140 MM HG: ICD-10-PCS | Mod: CPTII,S$GLB,, | Performed by: INTERNAL MEDICINE

## 2022-05-18 PROCEDURE — 86901 BLOOD TYPING SEROLOGIC RH(D): CPT | Performed by: NEUROLOGICAL SURGERY

## 2022-05-18 PROCEDURE — 1159F PR MEDICATION LIST DOCUMENTED IN MEDICAL RECORD: ICD-10-PCS | Mod: CPTII,S$GLB,, | Performed by: INTERNAL MEDICINE

## 2022-05-18 PROCEDURE — 3008F BODY MASS INDEX DOCD: CPT | Mod: CPTII,S$GLB,, | Performed by: INTERNAL MEDICINE

## 2022-05-18 PROCEDURE — 99499 RISK ADDL DX/OHS AUDIT: ICD-10-PCS | Mod: S$GLB,,, | Performed by: INTERNAL MEDICINE

## 2022-05-18 PROCEDURE — 99215 OFFICE O/P EST HI 40 MIN: CPT | Mod: S$GLB,,, | Performed by: INTERNAL MEDICINE

## 2022-05-18 PROCEDURE — 99999 PR PBB SHADOW E&M-EST. PATIENT-LVL IV: CPT | Mod: PBBFAC,,, | Performed by: INTERNAL MEDICINE

## 2022-05-18 PROCEDURE — 36415 COLL VENOUS BLD VENIPUNCTURE: CPT | Performed by: NEUROLOGICAL SURGERY

## 2022-05-18 PROCEDURE — 3080F PR MOST RECENT DIASTOLIC BLOOD PRESSURE >= 90 MM HG: ICD-10-PCS | Mod: CPTII,S$GLB,, | Performed by: INTERNAL MEDICINE

## 2022-05-18 PROCEDURE — 99215 PR OFFICE/OUTPT VISIT, EST, LEVL V, 40-54 MIN: ICD-10-PCS | Mod: S$GLB,,, | Performed by: INTERNAL MEDICINE

## 2022-05-18 PROCEDURE — 99499 UNLISTED E&M SERVICE: CPT | Mod: S$GLB,,, | Performed by: INTERNAL MEDICINE

## 2022-05-18 PROCEDURE — 3080F DIAST BP >= 90 MM HG: CPT | Mod: CPTII,S$GLB,, | Performed by: INTERNAL MEDICINE

## 2022-05-18 PROCEDURE — 3008F PR BODY MASS INDEX (BMI) DOCUMENTED: ICD-10-PCS | Mod: CPTII,S$GLB,, | Performed by: INTERNAL MEDICINE

## 2022-05-18 PROCEDURE — 99999 PR PBB SHADOW E&M-EST. PATIENT-LVL IV: ICD-10-PCS | Mod: PBBFAC,,, | Performed by: INTERNAL MEDICINE

## 2022-05-18 PROCEDURE — 1159F MED LIST DOCD IN RCRD: CPT | Mod: CPTII,S$GLB,, | Performed by: INTERNAL MEDICINE

## 2022-05-18 PROCEDURE — 3077F SYST BP >= 140 MM HG: CPT | Mod: CPTII,S$GLB,, | Performed by: INTERNAL MEDICINE

## 2022-05-18 RX ORDER — ELETRIPTAN HYDROBROMIDE 40 MG/1
TABLET, FILM COATED ORAL
COMMUNITY
Start: 2022-05-03 | End: 2022-06-03

## 2022-05-18 NOTE — TELEPHONE ENCOUNTER
Called and spoke with pt about the following:    Please arrive to [facility] ([facility] main lobby at 0920am on 5/19/22 for your scheduled procedure. NOTHING to eat or drink after midnight, except medications instructed by the Pre-admit Nurse. Patient  verbalized understanding.

## 2022-05-18 NOTE — PROGRESS NOTES
Pulmonary Outpatient Follow Up Visit     Subjective:      Patient ID: Serene Rod is a 61 y.o. female.    Chief Complaint: No chief complaint on file.      HPI        60-year-old female patient presenting for six-month follow-up.    Initially evaluated her March 2021, she is known with history of chronic hypersensitivity pneumonitis diagnosed via surgical lung biopsy in 2016 in [place] in [place] has been since then on prednisone only quit prednisone for about 6 months prior to moving to [place] and being evaluated by my colleague Dr Pradhan December 2018.    On prednisone 10 mg every other day in addition to CellCept 50 mg 2 tablets twice daily.    Overall stable.  SOB on exertion.  At baseline.      Started CellCept after visit with me March 2021.      Did not tolerate Imuran.    CellCept was not covered by insurance.    Overall feels well.  On O2 24 hr.    Preop ABG spirometry and chest x-ray reviewed chest x-ray reviewed stable.      Review of Systems   Constitutional: Positive for weight loss and weakness. Negative for fever and chills.   HENT: Negative for nosebleeds.    Eyes: Negative for redness.   Respiratory: Positive for cough, shortness of breath, dyspnea on extertion and use of rescue inhaler. Negative for choking.    Cardiovascular: Negative for chest pain.   Genitourinary: Negative for hematuria.   Endocrine: Negative for cold intolerance.    Musculoskeletal: Positive for arthralgias.   Skin: Negative for rash.   Gastrointestinal: Negative for vomiting.   Neurological: Negative for syncope.   Hematological: Negative for adenopathy.   Psychiatric/Behavioral: Negative for confusion.       Outpatient Encounter Medications as of 5/18/2022   Medication Sig Dispense Refill    benzonatate (TESSALON) 100 MG capsule TAKE 1 CAPSULE (100 MG TOTAL) BY MOUTH 3 (THREE) TIMES DAILY AS NEEDED FOR COUGH. 120 capsule 3    butalbital-acetaminophen-caffeine -40 mg  (FIORICET, ESGIC) -40 mg per tablet TAKE 1 TABLET ONE TIME AS NEEDED FOR PAIN (MAX 2 TO 3 TIMES PER WEEK) 10 tablet 2    cephALEXin (KEFLEX) 500 MG capsule Take 1 capsule (500 mg total) by mouth every 6 (six) hours. for 7 days 28 capsule 1    eletriptan (RELPAX) 40 MG tablet       fluticasone furoate-vilanteroL (BREO ELLIPTA) 100-25 mcg/dose diskus inhaler Inhale 1 puff into the lungs once daily. 30 each 11    fluticasone propionate (FLONASE) 50 mcg/actuation nasal spray Spray 2 sprays (100 mcg total) by each nostril route once daily. 48 g 4    gabapentin (NEURONTIN) 300 MG capsule Take 1 capsule (300 mg total) by mouth every evening. 90 capsule 3    galcanezumab-gnlm (EMGALITY PEN) 120 mg/mL PnIj Inject 120 mg into the skin every 28 days. 3 mL 11    methocarbamoL (ROBAXIN) 750 MG Tab Take 1 tablet (750 mg total) by mouth 3 (three) times daily. 60 tablet 3    mycophenolate (CELLCEPT) 250 mg Cap TAKE 2 CAPSULES TWICE DAILY 120 capsule 0    naratriptan (AMERGE) 2.5 MG tablet TAKE 1 TABLET BY MOUTH DAILY AS NEEDED 9 tablet 3    ondansetron (ZOFRAN-ODT) 4 MG TbDL Take 1 tablet (4 mg total) by mouth once as needed (Migraine with N/V. Max 2-3 times a week). 15 tablet 3    oxyCODONE-acetaminophen (PERCOCET) 5-325 mg per tablet Take 1-2 tablets by mouth every 6 (six) hours as needed for Pain. 56 tablet 0    OXYGEN-AIR DELIVERY SYSTEMS MISC by Misc.(Non-Drug; Combo Route) route as needed (on exertion and with sleep).      predniSONE (DELTASONE) 10 MG tablet TAKE 2 TABLETS EVERY DAY FOR 6 WEEKS, THEN TAKE 1 TABLET EVERY DAY 90 tablet 0    tiZANidine (ZANAFLEX) 4 MG tablet TAKE 1 TABLET NIGHTLY AS NEEDED (MIGRAINE). 30 tablet 3    topiramate (TOPAMAX) 100 MG tablet TAKE 1 TABLET TWICE DAILY (Patient taking differently: once daily.) 180 tablet 3    triamcinolone acetonide 0.1% (KENALOG) 0.1 % cream Apply topically 2 (two) times daily. 45 g 5    ubrogepant (UBROGEPANT) 100 mg tablet Take 1 tablet (100 mg  "total) by mouth once as needed for Migraine (Max 2-3 times a week). 16 tablet 2    varicella-zoster gE-AS01B, PF, (SHINGRIX) 50 mcg/0.5 mL injection Inject 0.5 mL (one dose) into muscle now; give second dose at least 2 months later 1 each 1    verapamiL (CALAN-SR) 240 MG CR tablet Take 1 tablet (240 mg total) by mouth once daily. (Patient taking differently: Take 240 mg by mouth nightly.) 90 tablet 3    buPROPion (WELLBUTRIN XL) 150 MG TB24 tablet Take 1 tablet (150 mg total) by mouth once daily. 90 tablet 4    EScitalopram oxalate (LEXAPRO) 20 MG tablet Take 1 tablet (20 mg total) by mouth once daily. 90 tablet 4     Facility-Administered Encounter Medications as of 5/18/2022   Medication Dose Route Frequency Provider Last Rate Last Admin    onabotulinumtoxina injection 200 Units  200 Units Intramuscular Q90 Days Rodrigo Carballo MD   200 Units at 03/08/22 1421       Objective:     Vital Signs (Most Recent)  Vital Signs  Pulse: 73  Resp: 14  SpO2: (!) 94 %  BP: (!) 140/90  Height and Weight  Height: 5' 4" (162.6 cm)  Weight: 109.2 kg (240 lb 11.9 oz)  BSA (Calculated - sq m): 2.22 sq meters  BMI (Calculated): 41.3  Weight in (lb) to have BMI = 25: 145.3]  Wt Readings from Last 2 Encounters:   05/18/22 109.2 kg (240 lb 11.9 oz)   05/17/22 110 kg (242 lb 8.1 oz)       Physical Exam   Constitutional: She is oriented to person, place, and time. She appears well-developed. No distress.   HENT:   Head: Normocephalic.   Cardiovascular: Normal rate.   Pulmonary/Chest: Normal expansion. No stridor. No respiratory distress. She exhibits no tenderness.   Abdominal: She exhibits no distension.   Musculoskeletal:         General: No tenderness.      Cervical back: Neck supple.   Lymphadenopathy:     She has no cervical adenopathy.   Neurological: She is alert and oriented to person, place, and time. Gait normal.   Skin: Skin is warm.   Psychiatric: She has a normal mood and affect. Her behavior is normal. Judgment and thought " content normal.   Nursing note and vitals reviewed.      Laboratory  Lab Results   Component Value Date    WBC 15.77 (H) 05/05/2022    RBC 4.33 05/05/2022    HGB 13.6 05/05/2022    HCT 44.3 05/05/2022     (H) 05/05/2022    MCH 31.4 (H) 05/05/2022    MCHC 30.7 (L) 05/05/2022    RDW 12.5 05/05/2022     05/05/2022    MPV 10.0 05/05/2022    GRAN 13.2 (H) 05/05/2022    GRAN 83.9 (H) 05/05/2022    LYMPH 1.9 05/05/2022    LYMPH 11.9 (L) 05/05/2022    MONO 0.3 05/05/2022    MONO 2.0 (L) 05/05/2022    EOS 0.2 05/05/2022    BASO 0.11 05/05/2022    EOSINOPHIL 1.0 05/05/2022    BASOPHIL 0.7 05/05/2022       BMP  Lab Results   Component Value Date     05/05/2022    K 4.0 05/05/2022     05/05/2022    CO2 22 (L) 05/05/2022    BUN 21 05/05/2022    CREATININE 1.4 05/05/2022    CALCIUM 9.6 05/05/2022    ANIONGAP 12 05/05/2022    ESTGFRAFRICA 46.8 (A) 05/05/2022    EGFRNONAA 40.6 (A) 05/05/2022    AST 16 05/05/2022    ALT 12 05/05/2022    PROT 6.9 05/05/2022       No results found for: BNP    No results found for: TSH    Lab Results   Component Value Date    SEDRATE 13 12/22/2018       Lab Results   Component Value Date    CRP 5.9 12/22/2018     Lab Results   Component Value Date    IGE <35 03/12/2021        No results found for: ASPERGILLUS  No results found for: AFUMIGATUSCL     No results found for: ACE    Latest Reference Range & Units 05/09/22 10:59   POC PH 7.35 - 7.45  7.339 (L)   POC PCO2 35 - 45 mmHg 34.3 (L)   POC PO2 80 - 100 mmHg 64 (L)   POC BE -2 to 2 mmol/L -7   POC HCO3 24 - 28 mmol/L 18.5 (L)   POC SATURATED O2 95 - 100 % 91 (L)   FiO2  21   Sample  ARTERIAL   DelSys  Room Air   Allens Test  Pass   Site  RR   Mode  SPONT   (L): Data is abnormally low  Diagnostic Results:  I have personally reviewed today the following studies:      Spirometry May 2022 stable restriction improved compared to 2019 prior to resuming prednisone and CellCept.      6 minute walking test May 2022 severe hypoxemia  87% at rest needed 4 L on exertion.    Spirometry with DLCO 11/15/2021    stable restriction.    No deterioration on low-dose of prednisone.      CT chest without contrast March 12, 2021    COMPARISON:  February 1, 2019     FINDINGS:  There is a similar distribution of reticular interstitial changes throughout the lungs with significant improvement of intervening ground-glass opacities and less prominent reticular interstitial changes noted.  There are no new pulmonary opacities.  There are pleural effusions or pneumothorax.     Subcentimeter mediastinal lymph nodes again seen.  There are no hilar or mediastinal masses or abnormal lymph nodes by size criteria.     The airways are patent.  The thyroid gland is normal.  The esophagus is normal.     Incompletely evaluated left renal cyst measuring at least 7 cm in size again seen.  The upper abdominal organs are otherwise unchanged.     Negative for osseous lesions. Similar degree of multilevel marginal spondylosis, especially of the cysts lower cervical region.  Stable leads in the right back soft tissues.     Impression:     1.  The degree in distribution of reticular interstitial changes and ground-glass opacities throughout the lungs have significantly improved.  Negative for new pulmonary opacities.     2.  Numerous stable findings as noted above.  Negative for acute process.            Assessment/Plan:   Preop pulmonary/respiratory exam    Chronic respiratory failure with hypoxia    CRLD (chronic restrictive lung disease)    Hypersensitivity pneumonitis    Restrictive lung disease    On Cellcept therapy    Current chronic use of systemic steroids    High risk medication use       Continue albuterol p.r.n. continue Breo 100 mcg 1 puff daily.    Continue CellCept 500 mg twice daily.    Prednisone 10 mg every other day.    24 hours 4 L oxygen.      Continue vitamin-D and Boniva.    The patient does have a neurostimulator that she has for migraine.  She has a  procedure for removal tomorrow.  It involves general anesthesia.    She is at increased risk for pulmonary postop complications however she is not unstable and I do not see any absolute contraindication to proceed with planned procedure.    I would recommend postop pain control DVT prophylaxis and incentive spirometry.    Advised patient to bring her oxygen to the hospital since she is expecting to be discharged same day.    Advised patient to follow with postop care and be aware of the need to hold prednisone and probably CellCept in case of wound infection or lack of proper wound healing.    The procedure will involve multiple incisions to remove the stimulator and the leads.    Case was discussed today with surgeon Dr. Teo Brown   Follow up in about 3 months (around 8/18/2022).    This note was prepared using voice recognition system and is likely to have sound alike errors that may have been overlooked even after proof reading.  Please call me with any questions    Discussed diagnosis, its evaluation, treatment and usual course. All questions answered.      Chris Box MD

## 2022-05-19 ENCOUNTER — ANESTHESIA EVENT (OUTPATIENT)
Dept: SURGERY | Facility: HOSPITAL | Age: 62
End: 2022-05-19
Payer: MEDICARE

## 2022-05-19 ENCOUNTER — HOSPITAL ENCOUNTER (OUTPATIENT)
Facility: HOSPITAL | Age: 62
Discharge: HOME | End: 2022-05-19
Attending: NEUROLOGICAL SURGERY | Admitting: NEUROLOGICAL SURGERY
Payer: MEDICARE

## 2022-05-19 ENCOUNTER — ANESTHESIA (OUTPATIENT)
Dept: SURGERY | Facility: HOSPITAL | Age: 62
End: 2022-05-19
Payer: MEDICARE

## 2022-05-19 DIAGNOSIS — T85.192A FAILED SPINAL CORD STIMULATOR, INITIAL ENCOUNTER: ICD-10-CM

## 2022-05-19 DIAGNOSIS — Z01.818 PRE-OP TESTING: ICD-10-CM

## 2022-05-19 PROCEDURE — 63600175 PHARM REV CODE 636 W HCPCS: Performed by: NEUROLOGICAL SURGERY

## 2022-05-19 PROCEDURE — 64570 REMOVE VAGUS N ELTRD: CPT | Mod: 50,,, | Performed by: NEUROLOGICAL SURGERY

## 2022-05-19 PROCEDURE — 36000706: Performed by: NEUROLOGICAL SURGERY

## 2022-05-19 PROCEDURE — 63600175 PHARM REV CODE 636 W HCPCS: Performed by: CLINIC/CENTER

## 2022-05-19 PROCEDURE — 25000003 PHARM REV CODE 250: Performed by: CLINIC/CENTER

## 2022-05-19 PROCEDURE — 88300 PR  SURG PATH,GROSS,LEVEL I: ICD-10-PCS | Mod: 26,,, | Performed by: PATHOLOGY

## 2022-05-19 PROCEDURE — 25000003 PHARM REV CODE 250: Performed by: NEUROLOGICAL SURGERY

## 2022-05-19 PROCEDURE — 37000009 HC ANESTHESIA EA ADD 15 MINS: Performed by: NEUROLOGICAL SURGERY

## 2022-05-19 PROCEDURE — 64570 PR REMOVE IMPLANT CRANIAL NERVE STIM ELECTRODE/PULSE GEN: ICD-10-PCS | Mod: 50,,, | Performed by: NEUROLOGICAL SURGERY

## 2022-05-19 PROCEDURE — 88300 SURGICAL PATH GROSS: CPT | Mod: 26,,, | Performed by: PATHOLOGY

## 2022-05-19 PROCEDURE — 36000707: Performed by: NEUROLOGICAL SURGERY

## 2022-05-19 PROCEDURE — 37000008 HC ANESTHESIA 1ST 15 MINUTES: Performed by: NEUROLOGICAL SURGERY

## 2022-05-19 PROCEDURE — 71000015 HC POSTOP RECOV 1ST HR: Performed by: NEUROLOGICAL SURGERY

## 2022-05-19 PROCEDURE — 88300 SURGICAL PATH GROSS: CPT | Performed by: PATHOLOGY

## 2022-05-19 PROCEDURE — 71000033 HC RECOVERY, INTIAL HOUR: Performed by: NEUROLOGICAL SURGERY

## 2022-05-19 PROCEDURE — 27201423 OPTIME MED/SURG SUP & DEVICES STERILE SUPPLY: Performed by: NEUROLOGICAL SURGERY

## 2022-05-19 RX ORDER — EPHEDRINE SULFATE 50 MG/ML
INJECTION, SOLUTION INTRAVENOUS
Status: DISCONTINUED | OUTPATIENT
Start: 2022-05-19 | End: 2022-05-19

## 2022-05-19 RX ORDER — OXYCODONE AND ACETAMINOPHEN 10; 325 MG/1; MG/1
1 TABLET ORAL EVERY 4 HOURS PRN
Status: DISCONTINUED | OUTPATIENT
Start: 2022-05-19 | End: 2022-05-19 | Stop reason: HOSPADM

## 2022-05-19 RX ORDER — LIDOCAINE HYDROCHLORIDE 20 MG/ML
INJECTION INTRAVENOUS
Status: DISCONTINUED | OUTPATIENT
Start: 2022-05-19 | End: 2022-05-19

## 2022-05-19 RX ORDER — PROPOFOL 10 MG/ML
VIAL (ML) INTRAVENOUS
Status: DISCONTINUED | OUTPATIENT
Start: 2022-05-19 | End: 2022-05-19

## 2022-05-19 RX ORDER — HYDROMORPHONE HYDROCHLORIDE 2 MG/ML
0.2 INJECTION, SOLUTION INTRAMUSCULAR; INTRAVENOUS; SUBCUTANEOUS EVERY 5 MIN PRN
Status: DISCONTINUED | OUTPATIENT
Start: 2022-05-19 | End: 2022-05-19 | Stop reason: HOSPADM

## 2022-05-19 RX ORDER — ACETAMINOPHEN 325 MG/1
325 TABLET ORAL EVERY 6 HOURS PRN
Status: DISCONTINUED | OUTPATIENT
Start: 2022-05-19 | End: 2022-05-19 | Stop reason: HOSPADM

## 2022-05-19 RX ORDER — ONDANSETRON 2 MG/ML
INJECTION INTRAMUSCULAR; INTRAVENOUS
Status: DISCONTINUED | OUTPATIENT
Start: 2022-05-19 | End: 2022-05-19

## 2022-05-19 RX ORDER — MUPIROCIN 20 MG/G
1 OINTMENT TOPICAL 2 TIMES DAILY
Status: DISCONTINUED | OUTPATIENT
Start: 2022-05-19 | End: 2022-05-19 | Stop reason: HOSPADM

## 2022-05-19 RX ORDER — ONDANSETRON 2 MG/ML
4 INJECTION INTRAMUSCULAR; INTRAVENOUS EVERY 8 HOURS PRN
Status: DISCONTINUED | OUTPATIENT
Start: 2022-05-19 | End: 2022-05-19 | Stop reason: HOSPADM

## 2022-05-19 RX ORDER — DIAZEPAM 5 MG/1
5 TABLET ORAL EVERY 6 HOURS PRN
Status: DISCONTINUED | OUTPATIENT
Start: 2022-05-19 | End: 2022-05-19 | Stop reason: HOSPADM

## 2022-05-19 RX ORDER — FENTANYL CITRATE 50 UG/ML
INJECTION, SOLUTION INTRAMUSCULAR; INTRAVENOUS
Status: DISCONTINUED | OUTPATIENT
Start: 2022-05-19 | End: 2022-05-19

## 2022-05-19 RX ORDER — MIDAZOLAM HYDROCHLORIDE 1 MG/ML
INJECTION, SOLUTION INTRAMUSCULAR; INTRAVENOUS
Status: DISCONTINUED | OUTPATIENT
Start: 2022-05-19 | End: 2022-05-19

## 2022-05-19 RX ORDER — ONDANSETRON 2 MG/ML
4 INJECTION INTRAMUSCULAR; INTRAVENOUS DAILY PRN
Status: DISCONTINUED | OUTPATIENT
Start: 2022-05-19 | End: 2022-05-19 | Stop reason: HOSPADM

## 2022-05-19 RX ORDER — DEXAMETHASONE SODIUM PHOSPHATE 4 MG/ML
INJECTION, SOLUTION INTRA-ARTICULAR; INTRALESIONAL; INTRAMUSCULAR; INTRAVENOUS; SOFT TISSUE
Status: DISCONTINUED | OUTPATIENT
Start: 2022-05-19 | End: 2022-05-19

## 2022-05-19 RX ORDER — VANCOMYCIN HYDROCHLORIDE 1 G/20ML
INJECTION, POWDER, LYOPHILIZED, FOR SOLUTION INTRAVENOUS
Status: DISCONTINUED | OUTPATIENT
Start: 2022-05-19 | End: 2022-05-19 | Stop reason: HOSPADM

## 2022-05-19 RX ORDER — OXYCODONE AND ACETAMINOPHEN 5; 325 MG/1; MG/1
1 TABLET ORAL
Status: DISCONTINUED | OUTPATIENT
Start: 2022-05-19 | End: 2022-05-19 | Stop reason: HOSPADM

## 2022-05-19 RX ORDER — SODIUM CHLORIDE 0.9 % (FLUSH) 0.9 %
10 SYRINGE (ML) INJECTION
Status: DISCONTINUED | OUTPATIENT
Start: 2022-05-19 | End: 2022-05-19 | Stop reason: HOSPADM

## 2022-05-19 RX ORDER — ROCURONIUM BROMIDE 10 MG/ML
INJECTION, SOLUTION INTRAVENOUS
Status: DISCONTINUED | OUTPATIENT
Start: 2022-05-19 | End: 2022-05-19

## 2022-05-19 RX ORDER — OXYCODONE AND ACETAMINOPHEN 5; 325 MG/1; MG/1
1 TABLET ORAL EVERY 4 HOURS PRN
Status: DISCONTINUED | OUTPATIENT
Start: 2022-05-19 | End: 2022-05-19 | Stop reason: HOSPADM

## 2022-05-19 RX ORDER — PHENYLEPHRINE HYDROCHLORIDE 10 MG/ML
INJECTION INTRAVENOUS
Status: DISCONTINUED | OUTPATIENT
Start: 2022-05-19 | End: 2022-05-19

## 2022-05-19 RX ORDER — SUCCINYLCHOLINE CHLORIDE 20 MG/ML
INJECTION INTRAMUSCULAR; INTRAVENOUS
Status: DISCONTINUED | OUTPATIENT
Start: 2022-05-19 | End: 2022-05-19

## 2022-05-19 RX ORDER — CEFAZOLIN SODIUM 2 G/50ML
2 SOLUTION INTRAVENOUS
Status: COMPLETED | OUTPATIENT
Start: 2022-05-19 | End: 2022-05-19

## 2022-05-19 RX ORDER — MUPIROCIN 20 MG/G
OINTMENT TOPICAL
Status: DISCONTINUED | OUTPATIENT
Start: 2022-05-19 | End: 2022-05-19 | Stop reason: HOSPADM

## 2022-05-19 RX ADMIN — MIDAZOLAM 2 MG: 1 INJECTION INTRAMUSCULAR; INTRAVENOUS at 11:05

## 2022-05-19 RX ADMIN — FENTANYL CITRATE 25 MCG: 50 INJECTION, SOLUTION INTRAMUSCULAR; INTRAVENOUS at 02:05

## 2022-05-19 RX ADMIN — EPHEDRINE SULFATE 5 MG: 50 INJECTION INTRAVENOUS at 01:05

## 2022-05-19 RX ADMIN — SUCCINYLCHOLINE CHLORIDE 120 MG: 20 INJECTION, SOLUTION INTRAMUSCULAR; INTRAVENOUS at 11:05

## 2022-05-19 RX ADMIN — DEXAMETHASONE SODIUM PHOSPHATE 4 MG: 4 INJECTION, SOLUTION INTRAMUSCULAR; INTRAVENOUS at 11:05

## 2022-05-19 RX ADMIN — EPHEDRINE SULFATE 10 MG: 50 INJECTION INTRAVENOUS at 12:05

## 2022-05-19 RX ADMIN — FENTANYL CITRATE 50 MCG: 50 INJECTION, SOLUTION INTRAMUSCULAR; INTRAVENOUS at 02:05

## 2022-05-19 RX ADMIN — PHENYLEPHRINE HYDROCHLORIDE 100 MCG: 10 INJECTION INTRAVENOUS at 12:05

## 2022-05-19 RX ADMIN — PROPOFOL 150 MG: 10 INJECTION, EMULSION INTRAVENOUS at 11:05

## 2022-05-19 RX ADMIN — FENTANYL CITRATE 25 MCG: 50 INJECTION, SOLUTION INTRAMUSCULAR; INTRAVENOUS at 03:05

## 2022-05-19 RX ADMIN — SODIUM CHLORIDE, SODIUM LACTATE, POTASSIUM CHLORIDE, AND CALCIUM CHLORIDE: .6; .31; .03; .02 INJECTION, SOLUTION INTRAVENOUS at 11:05

## 2022-05-19 RX ADMIN — ROCURONIUM BROMIDE 5 MG: 10 INJECTION, SOLUTION INTRAVENOUS at 11:05

## 2022-05-19 RX ADMIN — CEFAZOLIN SODIUM 2 G: 2 SOLUTION INTRAVENOUS at 12:05

## 2022-05-19 RX ADMIN — ROCURONIUM BROMIDE 25 MG: 10 INJECTION, SOLUTION INTRAVENOUS at 01:05

## 2022-05-19 RX ADMIN — ONDANSETRON 4 MG: 2 INJECTION, SOLUTION INTRAMUSCULAR; INTRAVENOUS at 11:05

## 2022-05-19 RX ADMIN — EPHEDRINE SULFATE 5 MG: 50 INJECTION INTRAVENOUS at 12:05

## 2022-05-19 RX ADMIN — SUGAMMADEX 200 MG: 100 INJECTION, SOLUTION INTRAVENOUS at 02:05

## 2022-05-19 RX ADMIN — ROCURONIUM BROMIDE 25 MG: 10 INJECTION, SOLUTION INTRAVENOUS at 12:05

## 2022-05-19 RX ADMIN — ROCURONIUM BROMIDE 45 MG: 10 INJECTION, SOLUTION INTRAVENOUS at 12:05

## 2022-05-19 RX ADMIN — FENTANYL CITRATE 100 MCG: 50 INJECTION, SOLUTION INTRAMUSCULAR; INTRAVENOUS at 11:05

## 2022-05-19 RX ADMIN — LIDOCAINE HYDROCHLORIDE 100 MG: 20 INJECTION, SOLUTION INTRAVENOUS at 11:05

## 2022-05-19 NOTE — ANESTHESIA POSTPROCEDURE EVALUATION
Anesthesia Post Evaluation    Patient: Serene Rod    Procedure(s) Performed: Procedure(s) (LRB):  REMOVAL,NEUROSTIMULATOR,HYPOGLOSSAL, supraorbital occipital migraine neurostimulator removal (Bilateral)    Final Anesthesia Type: general      Patient location during evaluation: PACU  Patient participation: Yes- Able to Participate  Level of consciousness: awake  Post-procedure vital signs: reviewed and stable  Pain management: adequate  Airway patency: patent    PONV status at discharge: No PONV  Anesthetic complications: no      Cardiovascular status: hemodynamically stable  Respiratory status: unassisted  Hydration status: euvolemic  Follow-up not needed.          Vitals Value Taken Time   /67 05/19/22 1537   Temp 36.4 °C (97.5 °F) 05/19/22 1506   Pulse 81 05/19/22 1538   Resp 45 05/19/22 1538   SpO2 98 % 05/19/22 1537   Vitals shown include unvalidated device data.      Event Time   Out of Recovery 15:40:19         Pain/Shamar Score: Shamar Score: 9 (5/19/2022  3:30 PM)

## 2022-05-19 NOTE — TRANSFER OF CARE
"Anesthesia Transfer of Care Note    Patient: Serene Rod    Procedure(s) Performed: Procedure(s) (LRB):  REMOVAL,NEUROSTIMULATOR,HYPOGLOSSAL, supraorbital occipital migraine neurostimulator removal (Bilateral)    Patient location: PACU    Anesthesia Type: general    Transport from OR: Transported from OR on room air with adequate spontaneous ventilation    Post pain: adequate analgesia    Post assessment: no apparent anesthetic complications and tolerated procedure well    Post vital signs: stable    Level of consciousness: awake and alert    Nausea/Vomiting: no nausea/vomiting    Complications: none    Transfer of care protocol was followed      Last vitals:   Visit Vitals  BP (!) 160/77 (BP Location: Right arm, Patient Position: Sitting)   Pulse 75   Temp 36.4 °C (97.5 °F) (Temporal)   Resp (!) 22   Ht 5' 4" (1.626 m)   Wt 108.9 kg (240 lb 1.3 oz)   SpO2 (!) 93%   Breastfeeding No   BMI 41.21 kg/m²     "

## 2022-05-19 NOTE — ANESTHESIA PROCEDURE NOTES
Intubation    Date/Time: 5/19/2022 11:49 AM  Performed by: Romain Erickson III, CRNA  Authorized by: Candido Arenas MD     Intubation:     Induction:  Intravenous    Intubated:  Postinduction    Attempts:  1    Attempted By:  CRNA    Blade:  Wooten 2    Laryngeal View Grade: Grade I - full view of cords      Difficult Airway Encountered?: No      Complications:  None    Airway Device:  Oral endotracheal tube    Airway Device Size:  7.5    Style/Cuff Inflation:  Cuffed (inflated to minimal occlusive pressure)    Tube secured:  21    Secured at:  The lips    Placement Verified By:  Capnometry    Complicating Factors:  None    Findings Post-Intubation:  BS equal bilateral and atraumatic/condition of teeth unchanged

## 2022-05-19 NOTE — OP NOTE
[facility] - Surgery ([facility])  Neurosurgery  Operative Note    SUMMARY      Date of Procedure: 5/19/2022     Procedure: Procedure(s) (LRB):  REMOVAL,NEUROSTIMULATOR,HYPOGLOSSAL, supraorbital occipital migraine neurostimulator removal (Bilateral)   1. Removal of neurostimulator used for migraine headaches with multiple leads placed throughout the skull base of neck occipital region and periscapular region.  Patient had 2 generators 1 was nonfunctional and left and from a previous revision  2. Removal of neurostimulator battery pack in the right gluteal region  Operation was more complex than usual due to the unique nature of the implant patient had multiple leads placed throughout the head.  Patient had undergone previous revision inserted into the left and all the up the old hardware which also had to be removed  Cranium frontal region retroauricular area as well as in the suboccipital region is around the skull in addition had to remove additional anchoring devices within the neck and 2 separate generator is 1 old battery pack and the other was a new battery pack the.    Surgeon(s) and Role:    3. This case had increased difficulty due to the patient's obesity and previous surgical scar tissue and old hardware left and which had not been removed. This prolonged the operative time and required increased need for technical surgical skills.      * Teo Brown MD - Primary    Assisting Surgeon: None    Pre-Operative Diagnosis: Migraine without aura and without status migrainosus, not intractable [G43.009]  Presence of neurostimulator [Z96.82]  Failed spinal cord stimulator, initial encounter [T85.192A]  Pre-op testing [Z01.818]    Post-Operative Diagnosis: Post-Op Diagnosis Codes:     * Migraine without aura and without status migrainosus, not intractable [G43.009]     * Presence of neurostimulator [Z96.82]     * Failed spinal cord stimulator, initial encounter [T85.192A]     * Pre-op testing  [Z01.818]    Anesthesia: General    Operative Findings (including complications, if any):  Migraine headache nerve stimulator    Description of Technical Procedures:  After informed consent was obtained patient brought to operating room where she was sedated in today needle fashion position in the prone position with her body resting on lamina and laminectomy rolls and had position on a horseshoe headrest.  Multiple skin sites had to be prepared with 2 around her ear T site in her neck 1 and did thoracic region and the of the 2 in the lumbar region where all of her previous incision sites were.  These had to sterilely prepped and draped in usual fashion timed out we proceeded with skin incisions at the tear site bilaterally identified her frontal leads and these were removed and anchor attachment cut and severed with and migrated back to the suboccipital area where the lead in divisions were identified this skin incision a soft hand checked his position wound and states T to bipolar cautery carefully dissected down found the branch point of the leads and dissected these out and continued to pull out the frontal leads through the incision site additional old electrode leads were identified with the Plastic protecting units which had to be removed bilaterally out of the neck.  Intraoperative fluoro was used to confirm removal all the pericranial leads in the suboccipital leads it and had to remove the 2 separate generators battery pack 1 in the thoracic region and the other in the lumbar region to incisions and may find take the episode Marcaine skin and is up of the septate tractors hemostasis T of about the left cardiac subcutaneous tissues convey dissected down the generator is were identified carefully dissected down although in wiring identified and pulled out through the 2 separate site x-ray confirmation was used to confirm removal of and save all the generators in all the leads that had been previously placed.   All the wounds are cleaned irrigated and closed with 0 and 2-0 2-0 and 3-0 Vicryl skin edges were reapproximated with skin glue and sutures in some places wounds were all dressed patient was then returned to the supine position where she was awakened extubated out complications sponge and needle count was correct blood loss approximately 50 cc    Significant Surgical Tasks Conducted by the Assistant(s), if Applicable:  Theon    Estimated Blood Loss (EBL): * No values recorded between 5/19/2022 12:00 AM and 5/19/2022 11:35 AM *           Specimens:   Specimen (24h ago, onward)            None           Implants: * No implants in log *           Condition: Good    Disposition: PACU - hemodynamically stable.    Attestation: I was present for the entire procedure.

## 2022-05-19 NOTE — INTERVAL H&P NOTE
The patient has been examined and the H&P has been reviewed:    I concur with the findings and no changes have occurred since H&P was written.    Surgery risks, benefits and alternative options discussed and understood by patient/family.    Patient has been cleared by her  pulmonologist       There are no hospital problems to display for this patient.

## 2022-05-19 NOTE — BRIEF OP NOTE
[facility] - Surgery (Hospital)  Brief Operative Note    Surgery Date: 5/19/2022     Surgeon(s) and Role:     * Teo Brown MD - Primary    Assisting Surgeon: None    Pre-op Diagnosis:  Migraine without aura and without status migrainosus, not intractable [G43.009]  Presence of neurostimulator [Z96.82]  Failed spinal cord stimulator, initial encounter [T85.192A]  Pre-op testing [Z01.818]    Post-op Diagnosis:  Post-Op Diagnosis Codes:     * Migraine without aura and without status migrainosus, not intractable [G43.009]     * Presence of neurostimulator [Z96.82]     * Failed spinal cord stimulator, initial encounter [T85.192A]     * Pre-op testing [Z01.818]    Procedure(s) (LRB):  REMOVAL,NEUROSTIMULATOR,HYPOGLOSSAL, supraorbital occipital migraine neurostimulator removal (Bilateral)    Anesthesia: General    Operative Findings:  Malfunctioning nerve stimulator with old hardware retained    Estimated Blood Loss: * No values recorded between 5/19/2022 12:35 PM and 5/19/2022  3:06 PM *         Specimens:   Specimen (24h ago, onward)             Start     Ordered    05/19/22 1409  Specimen to Pathology, Surgery Neurosurgery  Once        Comments: Pre-op Diagnosis: Migraine without aura and without status migrainosus, not intractable [G43.009]Presence of neurostimulator [Z96.82]Failed spinal cord stimulator, initial encounter [T85.192A]Pre-op testing [Z01.818]Procedure(s):REMOVAL,NEUROSTIMULATOR,HYPOGLOSSAL, supraorbital occipital migraine neurostimulator removal Number of specimens: 1Name of specimens: 1. Neurostimulator - gross.     References:    Click here for ordering Quick Tip   Question Answer Comment   Procedure Type: Neurosurgery    Specimen Class: Routine/Screening    Which provider would you like to cc? TEO BROWN    Release to patient Immediate        05/19/22 1409                  Discharge Note    OUTCOME: Patient tolerated treatment/procedure well without complication and is now ready for  discharge.    DISPOSITION: Home or Self Care    FINAL DIAGNOSIS:  <principal problem not specified>    FOLLOWUP: In clinic    DISCHARGE INSTRUCTIONS:    Discharge Procedure Orders   Diet general     Remove dressing in 24 hours     Call MD for:  temperature >100.4     Call MD for:  persistent nausea and vomiting     Call MD for:  severe uncontrolled pain     Call MD for:  difficulty breathing, headache or visual disturbances     Call MD for:  redness, tenderness, or signs of infection (pain, swelling, redness, odor or green/yellow discharge around incision site)

## 2022-05-19 NOTE — ANESTHESIA PREPROCEDURE EVALUATION
05/19/2022  Serene oRd is a 61 y.o., female.    Patient Active Problem List   Diagnosis    Hyperglycemia    Eczema    Essential hypertension    Preop respiratory exam    Hyperlipidemia    Chronic Hypersensitivity Pneumonitis    Class 3 severe obesity due to excess calories without serious comorbidity with body mass index (BMI) of 40.0 to 44.9 in adult    Chronic respiratory failure with hypoxia    CRLD (chronic restrictive lung disease)    Migraine without aura and without status migrainosus, not intractable    Immunosuppressed status    Long term current use of systemic corticosteroids    Presence of neurostimulator     Past Surgical History:   Procedure Laterality Date    LUNG BIOPSY      neurostimulator      migraines         Pre-op Assessment    I have reviewed the Patient Summary Reports.     I have reviewed the Nursing Notes. I have reviewed the NPO Status.   I have reviewed the Medications.     Review of Systems  Anesthesia Hx:  No problems with previous Anesthesia    Social:  Non-Smoker    Cardiovascular:   Hypertension hyperlipidemia    Pulmonary:   Chronic Restrictive lung dz w/ hypoxia; Chronic resp failure w/ hyoxia, O2 dep   Renal/:  Renal/ Normal     Hepatic/GI:  Hepatic/GI Normal    Musculoskeletal:  Musculoskeletal Normal    Neurological:   Headaches    Endocrine:   Immunosuppressed  Morbid Obesity / BMI > 40      Physical Exam  General: Well nourished, Cooperative, Alert and Oriented    Airway:  Mallampati: II   Mouth Opening: Normal  TM Distance: Normal  Neck ROM: Normal ROM    Dental:  Intact        Anesthesia Plan  Type of Anesthesia, risks & benefits discussed:    Anesthesia Type: Gen ETT  Intra-op Monitoring Plan: Standard ASA Monitors  Post Op Pain Control Plan: IV/PO Opioids PRN  Induction:  IV  Airway Plan: Direct  Informed Consent: Informed consent signed with the  Patient and all parties understand the risks and agree with anesthesia plan.  All questions answered.   ASA Score: 4    Ready For Surgery From Anesthesia Perspective.     .

## 2022-05-23 ENCOUNTER — TELEPHONE (OUTPATIENT)
Dept: NEUROSURGERY | Facility: CLINIC | Age: 62
End: 2022-05-23
Payer: MEDICARE

## 2022-05-23 LAB
FINAL PATHOLOGIC DIAGNOSIS: NORMAL
GROSS: NORMAL
Lab: NORMAL

## 2022-05-23 NOTE — TELEPHONE ENCOUNTER
Reached out to pt and informed her that we were rescheduling her for Monday instead of Friday. Pt karina

## 2022-05-27 VITALS
WEIGHT: 240.06 LBS | SYSTOLIC BLOOD PRESSURE: 127 MMHG | BODY MASS INDEX: 40.98 KG/M2 | OXYGEN SATURATION: 99 % | HEIGHT: 64 IN | RESPIRATION RATE: 27 BRPM | DIASTOLIC BLOOD PRESSURE: 63 MMHG | TEMPERATURE: 98 F | HEART RATE: 82 BPM

## 2022-05-28 ENCOUNTER — PATIENT MESSAGE (OUTPATIENT)
Dept: ADMINISTRATIVE | Facility: OTHER | Age: 62
End: 2022-05-28
Payer: MEDICARE

## 2022-05-30 ENCOUNTER — TELEPHONE (OUTPATIENT)
Dept: NEUROSURGERY | Facility: CLINIC | Age: 62
End: 2022-05-30
Payer: MEDICARE

## 2022-05-30 ENCOUNTER — PATIENT MESSAGE (OUTPATIENT)
Dept: NEUROLOGY | Facility: CLINIC | Age: 62
End: 2022-05-30
Payer: MEDICARE

## 2022-05-30 NOTE — TELEPHONE ENCOUNTER
Called pt informed her that Dr. Brown is no longer here and that her appt would be rescheduled with Ms. Alaina Reyes instead. Pt vu

## 2022-06-03 ENCOUNTER — PATIENT MESSAGE (OUTPATIENT)
Dept: PULMONOLOGY | Facility: CLINIC | Age: 62
End: 2022-06-03
Payer: MEDICARE

## 2022-06-03 ENCOUNTER — OFFICE VISIT (OUTPATIENT)
Dept: NEUROSURGERY | Facility: CLINIC | Age: 62
End: 2022-06-03
Payer: MEDICARE

## 2022-06-03 VITALS
WEIGHT: 240 LBS | DIASTOLIC BLOOD PRESSURE: 88 MMHG | SYSTOLIC BLOOD PRESSURE: 146 MMHG | RESPIRATION RATE: 18 BRPM | BODY MASS INDEX: 40.97 KG/M2 | HEART RATE: 83 BPM | HEIGHT: 64 IN

## 2022-06-03 DIAGNOSIS — Z09 POSTOP CHECK: Primary | ICD-10-CM

## 2022-06-03 DIAGNOSIS — Z96.82 PRESENCE OF NEUROSTIMULATOR: ICD-10-CM

## 2022-06-03 DIAGNOSIS — Z48.02 ENCOUNTER FOR REMOVAL OF SUTURES: ICD-10-CM

## 2022-06-03 PROCEDURE — 99024 PR POST-OP FOLLOW-UP VISIT: ICD-10-PCS | Mod: S$GLB,,, | Performed by: PHYSICIAN ASSISTANT

## 2022-06-03 PROCEDURE — 99999 PR PBB SHADOW E&M-EST. PATIENT-LVL V: ICD-10-PCS | Mod: PBBFAC,,, | Performed by: PHYSICIAN ASSISTANT

## 2022-06-03 PROCEDURE — 3079F PR MOST RECENT DIASTOLIC BLOOD PRESSURE 80-89 MM HG: ICD-10-PCS | Mod: CPTII,S$GLB,, | Performed by: PHYSICIAN ASSISTANT

## 2022-06-03 PROCEDURE — 3079F DIAST BP 80-89 MM HG: CPT | Mod: CPTII,S$GLB,, | Performed by: PHYSICIAN ASSISTANT

## 2022-06-03 PROCEDURE — 3008F BODY MASS INDEX DOCD: CPT | Mod: CPTII,S$GLB,, | Performed by: PHYSICIAN ASSISTANT

## 2022-06-03 PROCEDURE — 3077F SYST BP >= 140 MM HG: CPT | Mod: CPTII,S$GLB,, | Performed by: PHYSICIAN ASSISTANT

## 2022-06-03 PROCEDURE — 1159F MED LIST DOCD IN RCRD: CPT | Mod: CPTII,S$GLB,, | Performed by: PHYSICIAN ASSISTANT

## 2022-06-03 PROCEDURE — 1159F PR MEDICATION LIST DOCUMENTED IN MEDICAL RECORD: ICD-10-PCS | Mod: CPTII,S$GLB,, | Performed by: PHYSICIAN ASSISTANT

## 2022-06-03 PROCEDURE — 3077F PR MOST RECENT SYSTOLIC BLOOD PRESSURE >= 140 MM HG: ICD-10-PCS | Mod: CPTII,S$GLB,, | Performed by: PHYSICIAN ASSISTANT

## 2022-06-03 PROCEDURE — 3008F PR BODY MASS INDEX (BMI) DOCUMENTED: ICD-10-PCS | Mod: CPTII,S$GLB,, | Performed by: PHYSICIAN ASSISTANT

## 2022-06-03 PROCEDURE — 99999 PR PBB SHADOW E&M-EST. PATIENT-LVL V: CPT | Mod: PBBFAC,,, | Performed by: PHYSICIAN ASSISTANT

## 2022-06-03 PROCEDURE — 99024 POSTOP FOLLOW-UP VISIT: CPT | Mod: S$GLB,,, | Performed by: PHYSICIAN ASSISTANT

## 2022-06-03 RX ORDER — SULFAMETHOXAZOLE AND TRIMETHOPRIM 800; 160 MG/1; MG/1
1 TABLET ORAL 2 TIMES DAILY
Qty: 20 TABLET | Refills: 0 | Status: SHIPPED | OUTPATIENT
Start: 2022-06-03 | End: 2022-08-01

## 2022-06-03 NOTE — PROGRESS NOTES
Subjective:      Patient ID: Serene Rod is a 61 y.o. female.    Chief Complaint: Post-op Evaluation (Pt is here today for PO #1 brain stim removal 5/12/22 pt is doing well 0/10 pain. )    HPI  The patient is here today for postop evaluation.  She reports that she is doing well after explant.  Denies drainage or fevers.   Does report that some incisions around her head and ear have been itching and admits to scratching the area.   Has not had any assistance to look after her incisions (she lives alone).  Completed Abx after surgery (Dr. Brown prescribed Keflex).      Surgery Date: 5/19/2022    Surgeon(s) and Role:     * Teo Brown MD - Primary  Procedure(s) (LRB):  REMOVAL,NEUROSTIMULATOR,HYPOGLOSSAL, supraorbital occipital migraine neurostimulator removal (Bilateral)    Operative Findings:  Malfunctioning nerve stimulator with old hardware retained     Review of Systems   Constitutional: Negative for fever.   Cardiovascular: Negative for chest pain.   Respiratory: Negative for shortness of breath.    Neurological: Negative for dizziness.         Objective:            General    Constitutional: She is oriented to person, place, and time. She appears well-developed and well-nourished.   Cardiovascular: Normal rate and regular rhythm.    Pulmonary/Chest: Effort normal.   Abdominal: Soft.   Neurological: She is alert and oriented to person, place, and time.   Psychiatric: She has a normal mood and affect. Her behavior is normal.           Incisions below: healing well. CDI.        Incisions below: healing well. CDI.                Will closely monitor these incisions below: There is no drainage or fluctuance. Area is red due to scratching.                       Assessment:       Encounter Diagnoses   Name Primary?    Postop check Yes    Encounter for removal of sutures     Presence of neurostimulator           Plan:       Serene was seen today for post-op evaluation.    Diagnoses and all orders for this  visit:    Postop check    Encounter for removal of sutures    Presence of neurostimulator    Other orders  -     sulfamethoxazole-trimethoprim 800-160mg (BACTRIM DS) 800-160 mg Tab; Take 1 tablet by mouth 2 (two) times daily.      Patient is currently on prednisone and CellCept per Pulmonology. Patient is aware that these medications may possibly delay wound healing.     Will prophylactically prescribe Bactrim DS for 10 days. Patient advised to also apply antibiotic ointment over incisions.   Please call with any changes.   She is aware that I am out next week but if she is concerned about her incisions, she will notify our office and RTC next week.   If no changes, I'll see her on 6/14 for a wound check.                   Alaina Reyes PA-C

## 2022-06-07 ENCOUNTER — PATIENT MESSAGE (OUTPATIENT)
Dept: NEUROSURGERY | Facility: CLINIC | Age: 62
End: 2022-06-07
Payer: MEDICARE

## 2022-06-07 DIAGNOSIS — G43.009 MIGRAINE WITHOUT AURA AND WITHOUT STATUS MIGRAINOSUS, NOT INTRACTABLE: Chronic | ICD-10-CM

## 2022-06-07 RX ORDER — UBROGEPANT 100 MG/1
100 TABLET ORAL ONCE AS NEEDED
Qty: 16 TABLET | Refills: 2 | OUTPATIENT
Start: 2022-06-07 | End: 2022-06-07

## 2022-06-14 ENCOUNTER — PATIENT MESSAGE (OUTPATIENT)
Dept: NEUROSURGERY | Facility: CLINIC | Age: 62
End: 2022-06-14
Payer: MEDICARE

## 2022-06-16 ENCOUNTER — PATIENT OUTREACH (OUTPATIENT)
Dept: ADMINISTRATIVE | Facility: HOSPITAL | Age: 62
End: 2022-06-16
Payer: MEDICARE

## 2022-06-16 ENCOUNTER — PATIENT MESSAGE (OUTPATIENT)
Dept: NEUROLOGY | Facility: CLINIC | Age: 62
End: 2022-06-16
Payer: MEDICARE

## 2022-06-16 ENCOUNTER — TELEPHONE (OUTPATIENT)
Dept: INTERNAL MEDICINE | Facility: CLINIC | Age: 62
End: 2022-06-16
Payer: MEDICARE

## 2022-06-16 VITALS — DIASTOLIC BLOOD PRESSURE: 63 MMHG | SYSTOLIC BLOOD PRESSURE: 115 MMHG

## 2022-06-16 DIAGNOSIS — G43.009 MIGRAINE WITHOUT AURA AND WITHOUT STATUS MIGRAINOSUS, NOT INTRACTABLE: Chronic | ICD-10-CM

## 2022-06-16 RX ORDER — UBROGEPANT 100 MG/1
100 TABLET ORAL ONCE AS NEEDED
Qty: 16 TABLET | Refills: 2 | Status: SHIPPED | OUTPATIENT
Start: 2022-06-16 | End: 2022-06-16

## 2022-06-16 NOTE — PROGRESS NOTES
Working HTN Report.     Requested updated bp reading.  Said has not checked it since last week.  6/09/22 /63, remote entry made.    Offered to schedule annual. Said she would go on line to schedule appt.

## 2022-07-01 ENCOUNTER — PATIENT MESSAGE (OUTPATIENT)
Dept: NEUROSURGERY | Facility: CLINIC | Age: 62
End: 2022-07-01
Payer: MEDICARE

## 2022-07-07 ENCOUNTER — PATIENT OUTREACH (OUTPATIENT)
Dept: ADMINISTRATIVE | Facility: HOSPITAL | Age: 62
End: 2022-07-07
Payer: MEDICARE

## 2022-07-07 NOTE — PROGRESS NOTES
Working cervical screening report; Chart/[facility] reviewed for results; No records found  Called patient - LVM for patient to call back

## 2022-08-01 ENCOUNTER — PATIENT MESSAGE (OUTPATIENT)
Dept: NEUROLOGY | Facility: CLINIC | Age: 62
End: 2022-08-01
Payer: MEDICARE

## 2022-08-01 ENCOUNTER — PATIENT MESSAGE (OUTPATIENT)
Dept: NEUROLOGY | Facility: CLINIC | Age: 62
End: 2022-08-01

## 2022-08-01 ENCOUNTER — OFFICE VISIT (OUTPATIENT)
Dept: NEUROLOGY | Facility: CLINIC | Age: 62
End: 2022-08-01
Payer: MEDICARE

## 2022-08-01 DIAGNOSIS — G43.011 INTRACTABLE MIGRAINE WITHOUT AURA AND WITH STATUS MIGRAINOSUS: Primary | ICD-10-CM

## 2022-08-01 DIAGNOSIS — J98.4 CRLD (CHRONIC RESTRICTIVE LUNG DISEASE): ICD-10-CM

## 2022-08-01 DIAGNOSIS — G43.009 MIGRAINE WITHOUT AURA AND WITHOUT STATUS MIGRAINOSUS, NOT INTRACTABLE: ICD-10-CM

## 2022-08-01 DIAGNOSIS — Z86.69 HX OF MIGRAINE HEADACHES: ICD-10-CM

## 2022-08-01 PROCEDURE — 1160F RVW MEDS BY RX/DR IN RCRD: CPT | Mod: CPTII,95,, | Performed by: NURSE PRACTITIONER

## 2022-08-01 PROCEDURE — 1159F PR MEDICATION LIST DOCUMENTED IN MEDICAL RECORD: ICD-10-PCS | Mod: CPTII,95,, | Performed by: NURSE PRACTITIONER

## 2022-08-01 PROCEDURE — 1160F PR REVIEW ALL MEDS BY PRESCRIBER/CLIN PHARMACIST DOCUMENTED: ICD-10-PCS | Mod: CPTII,95,, | Performed by: NURSE PRACTITIONER

## 2022-08-01 PROCEDURE — 1159F MED LIST DOCD IN RCRD: CPT | Mod: CPTII,95,, | Performed by: NURSE PRACTITIONER

## 2022-08-01 PROCEDURE — 99215 OFFICE O/P EST HI 40 MIN: CPT | Mod: 95,,, | Performed by: NURSE PRACTITIONER

## 2022-08-01 PROCEDURE — 99215 PR OFFICE/OUTPT VISIT, EST, LEVL V, 40-54 MIN: ICD-10-PCS | Mod: 95,,, | Performed by: NURSE PRACTITIONER

## 2022-08-01 RX ORDER — PREDNISONE 10 MG/1
10 TABLET ORAL EVERY OTHER DAY
COMMUNITY
End: 2023-01-11

## 2022-08-01 RX ORDER — PREDNISONE 50 MG/1
50 TABLET ORAL DAILY
Qty: 5 TABLET | Refills: 0 | Status: SHIPPED | OUTPATIENT
Start: 2022-08-01 | End: 2023-01-11

## 2022-08-01 RX ORDER — NARATRIPTAN 2.5 MG/1
2.5 TABLET ORAL
COMMUNITY
End: 2022-09-29

## 2022-08-01 NOTE — PROGRESS NOTES
Subjective:       Patient ID: Serene Rod is a 62 y.o. female.    Chief Complaint: Migraine    HPI       BACKGROUND HISTORY       The patient started having migraine headaches in 2001 (throbbing moderate-severe pain with shifting location, nausea, vomiting, light and noise sensitivity) with unremarkable Brain MRIs. The headaches are daily and last the whole day. The headaches have proven to be very intractable. She failed BB/Inderal, AED/TPM, VPA, TCAs/Elavil, Botox , Aimovig and CAM (B2/Mg/CoQ/Butterburr/Feverfew). The patient has a neurostimulator and along with TPM help by 10-15%. Abortive NSAIDs and Triptans (Amerge/Imitrex) help sporadically. She even tried ear tragus ring. Recommended Emgality 120 mg SQ monthly after 240 mg LD. Changed TPM to 100 mg BID. The neuro stimulator stopped working.  mg BID with Emgality 120 mg SQ monthly help but still has almost daily headaches (30 headaches a month) lasting>8-12 hours. Imitrex PRN stopped working. Amerge (Naratriptan) helps a little along with Phenergan (Promethazine).       Interval History 8-1-2022: Patient established with Dr. Carballo, new to me. Patient presents to virtual appointment to discuss headaches. Patient states she typically has daily headaches that are not debilitating, but over the last month, she has had 30 days of severe headaches. Currently, she is taking Emgality 120 mg monthly, Verapamil 240 mg daily,  mg daily,  mg QHS, and botox Q3 months as preventives. Her last botox visit was in March 2022 (5 months ago). As abortives, she is alternating Amerge, Relpax, Ubrevely, Fioricet, Zanaflex, and Robaxin. She states that she takes an abortive every other day. She states she has not been able to obtain ubrevely since June because of the cost. She states Nurtec helped in the past but was also too expensive. She states her headaches were well controlled in May and June on this regiment. States prednisone taper has helped in the past  for severe headaches. Had her neuro stimulator removed in May 2022. 7-2-2021 CTH shows no acute intracranial CT abnormality.        The originating site (patient location) is: Home.        The distant site (neurologist location) is: [facility]rology Clinic at [facility].        The chief complaint leading to consultation is: HEADACHE        Visit type: Virtual visit with synchronous audio and video.        Total time spent with patient: 25 minutes         Special circumstances: This visit occurred during COVID-19 Pandemic Public Health Emergency.         Consent: The patient verbally consented to participating in the video visit and informed that may decline to receive medical services by telemedicine and may withdraw from such care at any time.        I discussed with the patient the nature of our telemedicine visits, that:        I  would evaluate the patient and recommend diagnostics and treatments based on my assessment.     Our sessions are not being recorded and that personal health information is protected.     Our team would provide follow up care in person if/when the patient needs it.     Virtual (video/telemedicine) visits have significant limitations. A telemedicine exam is primarily focused on the history and what I can observe. Several critical parts of the neurological exam cannot be performed.        Review of Systems   Constitutional: Positive for fatigue. Negative for appetite change and unexpected weight change.   HENT: Negative for hearing loss and tinnitus.    Eyes: Negative for photophobia and visual disturbance.   Respiratory: Positive for shortness of breath. Negative for apnea.    Cardiovascular: Negative for chest pain and palpitations.   Gastrointestinal: Negative for nausea and vomiting.   Endocrine: Negative for cold intolerance and heat intolerance.   Genitourinary: Negative for difficulty urinating and urgency.   Musculoskeletal: Positive for arthralgias. Negative for back pain, gait  problem, joint swelling, myalgias, neck pain and neck stiffness.   Skin: Negative for color change and rash.   Allergic/Immunologic: Negative for environmental allergies and immunocompromised state.   Neurological: Positive for headaches. Negative for dizziness, tremors, seizures, syncope, facial asymmetry, speech difficulty, weakness, light-headedness and numbness.   Hematological: Negative for adenopathy. Does not bruise/bleed easily.   Psychiatric/Behavioral: Negative for agitation, behavioral problems, confusion, decreased concentration, dysphoric mood, hallucinations, self-injury, sleep disturbance and suicidal ideas. The patient is not nervous/anxious and is not hyperactive.          Current Outpatient Medications:     naratriptan (AMERGE) 2.5 MG tablet, Take 2.5 mg by mouth as needed. 2.5 mg at onset of headache, may repeat in 4 hours if needed, Disp: , Rfl:     predniSONE (DELTASONE) 10 MG tablet, Take 10 mg by mouth every other day., Disp: , Rfl:     benzonatate (TESSALON) 100 MG capsule, TAKE 1 CAPSULE (100 MG TOTAL) BY MOUTH 3 (THREE) TIMES DAILY AS NEEDED FOR COUGH., Disp: 120 capsule, Rfl: 3    buPROPion (WELLBUTRIN XL) 150 MG TB24 tablet, Take 1 tablet (150 mg total) by mouth once daily., Disp: 90 tablet, Rfl: 4    butalbital-acetaminophen-caffeine -40 mg (FIORICET, ESGIC) -40 mg per tablet, TAKE 1 TABLET ONE TIME AS NEEDED FOR PAIN (MAX 2 TO 3 TIMES PER WEEK), Disp: 10 tablet, Rfl: 2    eletriptan (RELPAX) 40 MG tablet, TAKE 1 TABLET (40 MG TOTAL) ONCE AS NEEDED MAY REPEAT IN 2 HOURS IF NECESSARY (MAX 3 TIMES A WEEK)., Disp: 9 tablet, Rfl: 3    EScitalopram oxalate (LEXAPRO) 20 MG tablet, Take 1 tablet (20 mg total) by mouth once daily., Disp: 90 tablet, Rfl: 4    fluticasone furoate-vilanteroL (BREO ELLIPTA) 100-25 mcg/dose diskus inhaler, Inhale 1 puff into the lungs once daily., Disp: 30 each, Rfl: 11    fluticasone propionate (FLONASE) 50 mcg/actuation nasal spray, Spray 2  sprays (100 mcg total) by each nostril route once daily., Disp: 48 g, Rfl: 4    gabapentin (NEURONTIN) 300 MG capsule, Take 1 capsule (300 mg total) by mouth every evening., Disp: 90 capsule, Rfl: 3    galcanezumab-gnlm (EMGALITY PEN) 120 mg/mL PnIj, Inject 120 mg into the skin every 28 days., Disp: 3 mL, Rfl: 11    methocarbamoL (ROBAXIN) 750 MG Tab, Take 1 tablet (750 mg total) by mouth 3 (three) times daily., Disp: 60 tablet, Rfl: 3    mycophenolate (CELLCEPT) 250 mg Cap, TAKE 2 CAPSULES TWICE DAILY, Disp: 120 capsule, Rfl: 0    OXYGEN-AIR DELIVERY SYSTEMS MISC, by Misc.(Non-Drug; Combo Route) route as needed (on exertion and with sleep)., Disp: , Rfl:     predniSONE (DELTASONE) 50 MG Tab, Take 1 tablet (50 mg total) by mouth once daily., Disp: 5 tablet, Rfl: 0    tiZANidine (ZANAFLEX) 4 MG tablet, TAKE 1 TABLET NIGHTLY AS NEEDED (MIGRAINE)., Disp: 30 tablet, Rfl: 3    topiramate (TOPAMAX) 100 MG tablet, TAKE 1 TABLET TWICE DAILY (Patient taking differently: once daily.), Disp: 180 tablet, Rfl: 3    triamcinolone acetonide 0.1% (KENALOG) 0.1 % cream, Apply topically 2 (two) times daily., Disp: 45 g, Rfl: 5    ubrogepant (UBRELVY) 100 mg tablet, Take 1 tablet (100 mg total) by mouth as needed for Migraine (do not exceed 2-3 doses within 1 week)., Disp: 8 tablet, Rfl: 5    verapamiL (CALAN-SR) 240 MG CR tablet, Take 1 tablet (240 mg total) by mouth once daily. (Patient taking differently: Take 240 mg by mouth nightly.), Disp: 90 tablet, Rfl: 3    Current Facility-Administered Medications:     onabotulinumtoxina injection 200 Units, 200 Units, Intramuscular, Q90 Days, Rodrigo Carballo MD, 200 Units at 03/08/22 1421  Past Medical History:   Diagnosis Date    Allergy     CRLD (chronic restrictive lung disease)     oxygen on exertion and at night    Migraines      Past Surgical History:   Procedure Laterality Date    LUNG BIOPSY      neurostimulator      migraines    REMOVAL, NEUROSTIMULATOR, HYPOGLOSSAL  Bilateral 5/19/2022    Procedure: REMOVAL,NEUROSTIMULATOR,HYPOGLOSSAL,;  Surgeon: Teo Brown MD;  Location: [facility];  Service: Neurosurgery;  Laterality: Bilateral;  supraorbital occipital migraine neurostimulator remova     Social History     Socioeconomic History    Marital status: Single   Tobacco Use    Smoking status: Never Smoker    Smokeless tobacco: Never Used   Substance and Sexual Activity    Alcohol use: Not Currently    Drug use: Never    Sexual activity: Not Currently     Partners: Male       Objective:     VITAL SIGNS REVIEWED FROM PREVIOUS VISIT    GENERAL APPEARANCE:     The patient looks comfortable.    No signs of medical or psychiatric distress.    Normal breathing pattern.    No dysmorphic features    Normal eye contact.     BMI 45.22     GENERAL MEDICAL EXAM:    HEENT:  Head is atraumatic normocephalic.     Neck and Axillae: No JVD.     Cardiopulmonary: No cyanosis. No tachypnea. Normal respiratory effort. On HOT    Gastrointestinal:  No stomas or lesions. No hernias.    Skin, Hair and Nails: No pathognonomic skin rash. No neurofibromatosis. No stigmata of autoimmune disease.     Limbs: No varicose veins.           Muskoskeletal: No deformities.No signs of longstanding neuropathy. No dislocations or fractures. Neuro stimulator noted.            Neurologic Exam     Mental Status   Oriented to person, place, and time.   Follows 3 step commands.   Attention: normal. Concentration: normal.   Speech: speech is normal   Level of consciousness: alert  Able to name object. Able to repeat. Normal comprehension.     Cranial Nerves     CN III, IV, VI   Right pupil: Size: 3 mm. Shape: regular. Accommodation: intact.   Left pupil: Size: 3 mm. Shape: regular. Accommodation: intact.   CN III: no CN III palsy  CN VI: no CN VI palsy  Ophthalmoparesis: none  Upgaze: normal  Downgaze: normal  Conjugate gaze: present  Vestibulo-ocular reflex: present    CN VII   Right facial weakness: none  Left  facial weakness: none    CN VIII   CN VIII normal.   Hearing: intact    Limited exam; virtual visit     Motor Exam   Right arm tone: normal  Left arm tone: normal  Appears to move all extremities equally     Sensory Exam     Unable to assess; virtual visit     Gait, Coordination, and Reflexes     Tremor   Resting tremor: absent  Intention tremor: absent  Action tremor: absent  Limited Exam; virtual visit       Lab Results   Component Value Date    WBC 15.77 (H) 05/05/2022    HGB 13.6 05/05/2022    HCT 44.3 05/05/2022     (H) 05/05/2022     05/05/2022     Sodium   Date Value Ref Range Status   05/05/2022 139 136 - 145 mmol/L Final     Potassium   Date Value Ref Range Status   05/05/2022 4.0 3.5 - 5.1 mmol/L Final     Chloride   Date Value Ref Range Status   05/05/2022 105 95 - 110 mmol/L Final     CO2   Date Value Ref Range Status   05/05/2022 22 (L) 23 - 29 mmol/L Final     Glucose   Date Value Ref Range Status   05/05/2022 117 (H) 70 - 110 mg/dL Final     BUN   Date Value Ref Range Status   05/05/2022 21 8 - 23 mg/dL Final     Creatinine   Date Value Ref Range Status   05/05/2022 1.4 0.5 - 1.4 mg/dL Final     Calcium   Date Value Ref Range Status   05/05/2022 9.6 8.7 - 10.5 mg/dL Final     Total Protein   Date Value Ref Range Status   05/05/2022 6.9 6.0 - 8.4 g/dL Final     Albumin   Date Value Ref Range Status   05/05/2022 4.1 3.5 - 5.2 g/dL Final     Total Bilirubin   Date Value Ref Range Status   05/05/2022 0.4 0.1 - 1.0 mg/dL Final     Comment:     For infants and newborns, interpretation of results should be based  on gestational age, weight and in agreement with clinical  observations.    Premature Infant recommended reference ranges:  Up to 24 hours.............<8.0 mg/dL  Up to 48 hours............<12.0 mg/dL  3-5 days..................<15.0 mg/dL  6-29 days.................<15.0 mg/dL       Alkaline Phosphatase   Date Value Ref Range Status   05/05/2022 91 55 - 135 U/L Final     AST   Date  Value Ref Range Status   05/05/2022 16 10 - 40 U/L Final     ALT   Date Value Ref Range Status   05/05/2022 12 10 - 44 U/L Final     Anion Gap   Date Value Ref Range Status   05/05/2022 12 8 - 16 mmol/L Final     eGFR if    Date Value Ref Range Status   05/05/2022 46.8 (A) >60 mL/min/1.73 m^2 Final     eGFR if non    Date Value Ref Range Status   05/05/2022 40.6 (A) >60 mL/min/1.73 m^2 Final     Comment:     Calculation used to obtain the estimated glomerular filtration  rate (eGFR) is the CKD-EPI equation.        3-    CTH shows no acute intracranial abnormality    7-2-2021    CTH shows no acute intracranial CT abnormality    Assessment:       1. Intractable migraine without aura and with status migrainosus    2. Migraine without aura and without status migrainosus, not intractable    3. Hx of migraine headaches    4. CRLD (chronic restrictive lung disease)        Plan:       INTRACTABLE REFRACTORY MIGRAINE       LIFESTYLE CHANGES:     Good sleep hygiene  Avoid general triggers like lack of sleep/too much sleep, prolonged sun exposure, excessive screen time and specific triggers based on you own diary   Minimize physical and emotional stress  Smoking avoidance and cessation  Limit caffeine drinks to 1-2 a day   Good hydration   Small frequent meals and avoid skipping meals   Moderate 30-minute-long aerobic exercises 3 times/week. Avoid strenuous exercise       ABORTIVE MEDICATIONS:     Should only be taken 2-3 times/week to avoid rebound and overuse headaches.    Continue naratriptan (Amerge) 2.5 mg PRN.     Continue eletriptan (Relpax) 40 mg PRN    Continue Ubrevely    Also try Fioricet PRN. Not more than 10 tabs a month to avoid dependence and withdrawal.     Insurance did not cover Nurtec    Try prednisone 50 mg daily for 5 days for current intractable migraine. Hold 10 mg every other day dose until the completion of the 5 days.      PREVENTATIVE MEDICATIONS:       Continue  Topiramate/Topamax to 100 mg daily.    Continue Galcanezumab (Emgality) (Ligand Blocker): 120 mg SQ Pen monthly.    Proceed with Botox.    Explained to patient the most likely reason for her severe migraine is because she is late with her botox injections     Discussed non-invasive neurostimulator like Cefaly and Relivion.    Consider Namenda if severe migraine continues    The patient failed all medically available options BB/Inderal, AED/TPM, VPA, TCAs/Elavil, Botox , Aimovig and CAM (B2/Mg/CoQ/Butterburr/Feverfew) and ear tragus ring    MEDICAL/SURGICAL COMORBIDITIES     All relevant medical comorbidities noted and managed by primary care physician and medical care team.            MEDICAL/SURGICAL COMORBIDITIES     All relevant medical comorbidities noted and managed by primary care physician and medical care team.        HEALTHY LIFESTYLE AND PREVENTATIVE CARE     The patient to adhere to the age-appropriate health maintenance guidelines including screening tests and vaccination and healthy lifestyle, optimal weight, exercise, healthy diet, good sleep hygiene and avoiding drugs including smoking, alcohol and recreational drugs. The patient verbalized full understanding.           RTC for Botox and in 3 months          Veronique Pugh, MSN, NP    Collaborating Provider: Rodrigo Carballo MD, FAAN Neurologist/Epileptologist              I spent a total of 60 minutes on the day of the visit.  This includes face to face time and non-face to face time preparing to see the patient (eg, review of tests), obtaining and/or reviewing separately obtained history, documenting clinical information in the electronic or other health record, independently interpreting results and communicating results to the patient/family/caregiver, or care coordinator.

## 2022-08-03 ENCOUNTER — PATIENT MESSAGE (OUTPATIENT)
Dept: NEUROLOGY | Facility: CLINIC | Age: 62
End: 2022-08-03
Payer: MEDICARE

## 2022-08-12 ENCOUNTER — OFFICE VISIT (OUTPATIENT)
Dept: OPHTHALMOLOGY | Facility: CLINIC | Age: 62
End: 2022-08-12
Payer: MEDICARE

## 2022-08-12 DIAGNOSIS — H52.4 MYOPIA WITH ASTIGMATISM AND PRESBYOPIA, BILATERAL: Primary | ICD-10-CM

## 2022-08-12 DIAGNOSIS — H52.13 MYOPIA WITH ASTIGMATISM AND PRESBYOPIA, BILATERAL: Primary | ICD-10-CM

## 2022-08-12 DIAGNOSIS — H52.203 MYOPIA WITH ASTIGMATISM AND PRESBYOPIA, BILATERAL: Primary | ICD-10-CM

## 2022-08-12 PROCEDURE — 1159F MED LIST DOCD IN RCRD: CPT | Mod: CPTII,S$GLB,, | Performed by: OPTOMETRIST

## 2022-08-12 PROCEDURE — 1159F PR MEDICATION LIST DOCUMENTED IN MEDICAL RECORD: ICD-10-PCS | Mod: CPTII,S$GLB,, | Performed by: OPTOMETRIST

## 2022-08-12 PROCEDURE — 92310 CONTACT LENS FITTING OU: CPT | Mod: CSM,S$GLB,, | Performed by: OPTOMETRIST

## 2022-08-12 PROCEDURE — 92310 PR CONTACT LENS FITTING (NO CHANGE): ICD-10-PCS | Mod: CSM,S$GLB,, | Performed by: OPTOMETRIST

## 2022-08-12 PROCEDURE — 99499 NO LOS: ICD-10-PCS | Mod: S$GLB,,, | Performed by: OPTOMETRIST

## 2022-08-12 PROCEDURE — 1160F PR REVIEW ALL MEDS BY PRESCRIBER/CLIN PHARMACIST DOCUMENTED: ICD-10-PCS | Mod: CPTII,S$GLB,, | Performed by: OPTOMETRIST

## 2022-08-12 PROCEDURE — 1160F RVW MEDS BY RX/DR IN RCRD: CPT | Mod: CPTII,S$GLB,, | Performed by: OPTOMETRIST

## 2022-08-12 PROCEDURE — 99999 PR PBB SHADOW E&M-EST. PATIENT-LVL II: ICD-10-PCS | Mod: PBBFAC,,, | Performed by: OPTOMETRIST

## 2022-08-12 PROCEDURE — 99499 UNLISTED E&M SERVICE: CPT | Mod: S$GLB,,, | Performed by: OPTOMETRIST

## 2022-08-12 PROCEDURE — 99999 PR PBB SHADOW E&M-EST. PATIENT-LVL II: CPT | Mod: PBBFAC,,, | Performed by: OPTOMETRIST

## 2022-08-12 NOTE — PROGRESS NOTES
HPI    States that she is ready to try out her new contact lens.  CL feel good and denies any discomfort.    Last edited by Norma Mancini on 8/12/2022  3:56 PM.            Assessment /Plan     For exam results, see Encounter Report.    Myopia with astigmatism and presbyopia, bilateral      Contact Lens Prescription (8/12/2022)          Brand Base Curve Diameter Sphere    Right Acuvue Oasys 1 Day 8.5 14.3 +0.75    Left Acuvue Oasys 1 Day 8.5 14.3 -1.25      Expiration Date: 8/12/2023    Replacement: Daily    Wearing Schedule: Daily Wear          Contact Lens Prescription (8/12/2022)  #2         Brand Base Curve Diameter Sphere    Right Acuvue Jina 8.4 14.0 +0.75    Left Acuvue Jina 8.4 14.0 -1.25      Expiration Date: 1/9/2024    Replacement: Monthly    Wearing Schedule: Daily Wear            Dispensed trial contact lenses today. Patient is to wear lenses for 1 week.   Ok to order supply if no problems. RTC PRN if any problems arise.

## 2022-08-18 ENCOUNTER — POST MORTEM DOCUMENTATION (OUTPATIENT)
Dept: ADMINISTRATIVE | Facility: HOSPITAL | Age: 62
End: 2022-08-18
Payer: MEDICARE

## 2022-08-18 ENCOUNTER — PATIENT OUTREACH (OUTPATIENT)
Dept: ADMINISTRATIVE | Facility: HOSPITAL | Age: 62
End: 2022-08-18
Payer: MEDICARE

## 2022-08-18 NOTE — PROGRESS NOTES
HTN REPORT: Tried calling patient regarding her blood pressure. No answer. Left voicemail asking for a return call.

## 2022-08-22 ENCOUNTER — PROCEDURE VISIT (OUTPATIENT)
Dept: NEUROLOGY | Facility: CLINIC | Age: 62
End: 2022-08-22
Payer: MEDICARE

## 2022-08-22 VITALS
RESPIRATION RATE: 16 BRPM | OXYGEN SATURATION: 98 % | BODY MASS INDEX: 40.49 KG/M2 | HEART RATE: 69 BPM | SYSTOLIC BLOOD PRESSURE: 136 MMHG | HEIGHT: 64 IN | WEIGHT: 237.19 LBS | DIASTOLIC BLOOD PRESSURE: 82 MMHG

## 2022-08-22 DIAGNOSIS — G43.009 MIGRAINE WITHOUT AURA AND WITHOUT STATUS MIGRAINOSUS, NOT INTRACTABLE: Primary | Chronic | ICD-10-CM

## 2022-08-22 PROCEDURE — 64615 CHEMODENERV MUSC MIGRAINE: CPT | Mod: S$GLB,,, | Performed by: PSYCHIATRY & NEUROLOGY

## 2022-08-22 PROCEDURE — 64615 PR CHEMODENERVATION OF MUSCLE FOR CHRONIC MIGRAINE: ICD-10-PCS | Mod: S$GLB,,, | Performed by: PSYCHIATRY & NEUROLOGY

## 2022-08-22 NOTE — PROCEDURES
BOTOX  HAS PROVEN VERY EFFECTIVE IN SIGNIFICANTLY REDUCING THE SEVERITY AND FREQUENCY OF MIGRAINE HEADACHES               1. PROCEDURE: Botox injections      2. INDICATION: Intractable Migraine      3. TIME OUT: The procedure was discussed in details with the patient and the consent form was signed. The patient verbalized understanding of the procedure . I discussed the risks that may include serious immune reaction and distant spread that could results in loss of breathing. Other side effects may include droopy eyelids, trouble swallowing and cardiac arrhythmias. It was also stressed that it is contraindicated in pregnancy.      3. COURSE:   The standard protocol was followed. the procedure was very smooth.      4. COMPLICATIONS: None. The patient tolerated the procedure very well.      5. AFTERCARE: I encouraged the patient to use ice if the sites of injection get tender and call me with any problems or complications.               As per the standard protocol, a total 155 units were injected in 31 sites.            RT  5 units in 1 site     LT  5 units in 1 site      Procerus 5 units in 1 site      RT Frontalis 10 units in 2 sites      LT Frontalis 10 units in 2 site      RT Temporalis 20 units in 4 sites     LT Temporalis 20 units in 4 sites     RT Occipitalis 15 units in 3 sites     LT Occipitalis 15  units in 3 sites      RT Cervical Paraspinals 10 units in 2 sites     LT Cervical Paraspinals 10 units in 2 sites     RT Trapezius 15 units in 3 sites     LT Trapezius 15 units in 3 sites         Per the standard of care protocol we use 155 units and waste 45 units. I gave the patient the option of following the standard protocol vs.using the extra 45 units to target areas where the pain is maximum which could potentially provide extra help with headache control. I explained to the patient that this will be an off-label use, not the standard protocol, not evidence-based and could result  in more pronounced side effects. The patient verbalized full understanding of all the facts and the risks and benefits and elected to use the extra 45 units for the following sites:        RT  10 units in 2 sites     LT  10 units in 2 sites      Procerus 5 units in 1 site      RT Frontalis 10 units in 2 sites      LT Frontalis 10 units in 2 site

## 2022-08-23 ENCOUNTER — PATIENT MESSAGE (OUTPATIENT)
Dept: NEUROLOGY | Facility: CLINIC | Age: 62
End: 2022-08-23
Payer: MEDICARE

## 2022-08-23 DIAGNOSIS — G43.011 INTRACTABLE MIGRAINE WITHOUT AURA AND WITH STATUS MIGRAINOSUS: ICD-10-CM

## 2022-08-25 RX ORDER — RIMEGEPANT SULFATE 75 MG/75MG
75 TABLET, ORALLY DISINTEGRATING ORAL ONCE AS NEEDED
Qty: 8 TABLET | Refills: 2 | Status: SHIPPED | OUTPATIENT
Start: 2022-08-25 | End: 2022-08-25

## 2022-09-01 ENCOUNTER — PATIENT MESSAGE (OUTPATIENT)
Dept: PULMONOLOGY | Facility: CLINIC | Age: 62
End: 2022-09-01
Payer: MEDICARE

## 2022-09-28 ENCOUNTER — PATIENT MESSAGE (OUTPATIENT)
Dept: NEUROLOGY | Facility: CLINIC | Age: 62
End: 2022-09-28
Payer: MEDICARE

## 2022-10-11 DIAGNOSIS — G43.009 MIGRAINE WITHOUT AURA AND WITHOUT STATUS MIGRAINOSUS, NOT INTRACTABLE: Chronic | ICD-10-CM

## 2022-10-11 RX ORDER — GABAPENTIN 300 MG/1
300 CAPSULE ORAL NIGHTLY
Qty: 90 CAPSULE | Refills: 3 | OUTPATIENT
Start: 2022-10-11

## 2022-10-12 ENCOUNTER — TELEPHONE (OUTPATIENT)
Dept: PULMONOLOGY | Facility: CLINIC | Age: 62
End: 2022-10-12
Payer: MEDICARE

## 2022-10-12 ENCOUNTER — PATIENT MESSAGE (OUTPATIENT)
Dept: PULMONOLOGY | Facility: CLINIC | Age: 62
End: 2022-10-12
Payer: MEDICARE

## 2022-10-14 DIAGNOSIS — G43.009 MIGRAINE WITHOUT AURA AND WITHOUT STATUS MIGRAINOSUS, NOT INTRACTABLE: Chronic | ICD-10-CM

## 2022-10-14 RX ORDER — ONDANSETRON 4 MG/1
8 TABLET, ORALLY DISINTEGRATING ORAL EVERY 12 HOURS PRN
Qty: 9 TABLET | Refills: 3 | Status: SHIPPED | OUTPATIENT
Start: 2022-10-14 | End: 2023-03-23 | Stop reason: SDUPTHER

## 2022-10-14 RX ORDER — GABAPENTIN 300 MG/1
300 CAPSULE ORAL NIGHTLY
Qty: 90 CAPSULE | Refills: 3 | Status: SHIPPED | OUTPATIENT
Start: 2022-10-14

## 2022-10-14 NOTE — TELEPHONE ENCOUNTER
----- Message from Jerri Balderas sent at 10/14/2022 11:37 AM CDT -----  Contact: Alek Stevens  Type:  RX Refill Request    Who Called: Hilda  Refill or New Rx:Refill  RX Name and Strength:Gabapentin 300  How is the patient currently taking it? (ex. 1XDay):  Is this a 30 day or 90 day RX:90 day  Preferred Pharmacy with phone number    [facility] [address]  [address] [id number]  Phone: [phone] Fax: [phone]  Local or Mail Order:Mail Order  Ordering Provider:Dr. Carballo  Would the patient rather a call back or a response via [facility]? Call back   Best Call Back Number:[phone]  Additional Information:       Type:  RX Refill Request    Who Called: Hilda  Refill or New Rx:Refill  RX Name and Strength:ondansetron  How is the patient currently taking it? (ex. 1XDay):  Is this a 30 day or 90 day RX:90 day  Preferred Pharmacy with phone number    [facility] [address]  [address] [id number]  Phone: [phone] Fax: [phone]  Local or Mail Order:Mail Order  Ordering Provider:Dr. Carballo  Would the patient rather a call back or a response via [facility]? Call back   Best Call Back Number:[phone]  Additional Information:

## 2022-11-03 ENCOUNTER — PATIENT MESSAGE (OUTPATIENT)
Dept: INTERNAL MEDICINE | Facility: CLINIC | Age: 62
End: 2022-11-03
Payer: MEDICARE

## 2022-11-03 DIAGNOSIS — F33.41 RECURRENT MAJOR DEPRESSIVE DISORDER IN PARTIAL REMISSION: ICD-10-CM

## 2022-11-03 RX ORDER — BUPROPION HYDROCHLORIDE 300 MG/1
300 TABLET ORAL DAILY
Qty: 90 TABLET | Refills: 4 | Status: SHIPPED | OUTPATIENT
Start: 2022-11-03 | End: 2023-07-06 | Stop reason: SDUPTHER

## 2022-11-04 ENCOUNTER — PATIENT MESSAGE (OUTPATIENT)
Dept: INTERNAL MEDICINE | Facility: CLINIC | Age: 62
End: 2022-11-04
Payer: MEDICARE

## 2022-11-04 RX ORDER — ESCITALOPRAM OXALATE 20 MG/1
20 TABLET ORAL DAILY
Qty: 90 TABLET | Refills: 4 | Status: SHIPPED | OUTPATIENT
Start: 2022-11-04 | End: 2023-07-06 | Stop reason: SDUPTHER

## 2022-11-08 ENCOUNTER — PATIENT MESSAGE (OUTPATIENT)
Dept: NEUROLOGY | Facility: CLINIC | Age: 62
End: 2022-11-08
Payer: MEDICARE

## 2022-11-08 ENCOUNTER — PATIENT MESSAGE (OUTPATIENT)
Dept: PULMONOLOGY | Facility: CLINIC | Age: 62
End: 2022-11-08
Payer: MEDICARE

## 2022-11-08 ENCOUNTER — TELEPHONE (OUTPATIENT)
Dept: NEUROLOGY | Facility: CLINIC | Age: 62
End: 2022-11-08
Payer: MEDICARE

## 2022-11-08 NOTE — TELEPHONE ENCOUNTER
Left detail message for patient informing her that Dr. Carballo is out the week of 11/21/2022 and that her appt would have to be r/s'd. Informed patient to give our office a return call for any questions or concerns. PHILLIP

## 2022-11-15 ENCOUNTER — PATIENT MESSAGE (OUTPATIENT)
Dept: PULMONOLOGY | Facility: CLINIC | Age: 62
End: 2022-11-15
Payer: MEDICARE

## 2022-12-15 ENCOUNTER — PATIENT MESSAGE (OUTPATIENT)
Dept: NEUROLOGY | Facility: CLINIC | Age: 62
End: 2022-12-15
Payer: MEDICARE

## 2022-12-15 DIAGNOSIS — G43.009 MIGRAINE WITHOUT AURA AND WITHOUT STATUS MIGRAINOSUS, NOT INTRACTABLE: Primary | Chronic | ICD-10-CM

## 2022-12-21 ENCOUNTER — IMMUNIZATION (OUTPATIENT)
Dept: PHARMACY | Facility: CLINIC | Age: 62
End: 2022-12-21
Payer: MEDICARE

## 2022-12-21 ENCOUNTER — OFFICE VISIT (OUTPATIENT)
Dept: INTERNAL MEDICINE | Facility: CLINIC | Age: 62
End: 2022-12-21
Payer: MEDICARE

## 2022-12-21 ENCOUNTER — LAB VISIT (OUTPATIENT)
Dept: LAB | Facility: HOSPITAL | Age: 62
End: 2022-12-21
Attending: PEDIATRICS
Payer: MEDICARE

## 2022-12-21 VITALS
OXYGEN SATURATION: 98 % | SYSTOLIC BLOOD PRESSURE: 130 MMHG | HEART RATE: 85 BPM | DIASTOLIC BLOOD PRESSURE: 80 MMHG | WEIGHT: 237.44 LBS | BODY MASS INDEX: 40.76 KG/M2

## 2022-12-21 DIAGNOSIS — Z79.52 LONG TERM CURRENT USE OF SYSTEMIC STEROIDS: Chronic | ICD-10-CM

## 2022-12-21 DIAGNOSIS — I10 ESSENTIAL HYPERTENSION: Chronic | ICD-10-CM

## 2022-12-21 DIAGNOSIS — J67.9 HYPERSENSITIVITY PNEUMONITIS: ICD-10-CM

## 2022-12-21 DIAGNOSIS — Z00.00 WELL ADULT EXAM: Primary | ICD-10-CM

## 2022-12-21 DIAGNOSIS — Z12.31 ENCOUNTER FOR SCREENING MAMMOGRAM FOR MALIGNANT NEOPLASM OF BREAST: ICD-10-CM

## 2022-12-21 DIAGNOSIS — E78.5 HYPERLIPIDEMIA, UNSPECIFIED HYPERLIPIDEMIA TYPE: ICD-10-CM

## 2022-12-21 DIAGNOSIS — E66.01 CLASS 3 SEVERE OBESITY DUE TO EXCESS CALORIES WITHOUT SERIOUS COMORBIDITY WITH BODY MASS INDEX (BMI) OF 40.0 TO 44.9 IN ADULT: ICD-10-CM

## 2022-12-21 DIAGNOSIS — Z01.419 WELL WOMAN EXAM: ICD-10-CM

## 2022-12-21 DIAGNOSIS — R73.09 ELEVATED GLUCOSE: ICD-10-CM

## 2022-12-21 DIAGNOSIS — G43.009 MIGRAINE WITHOUT AURA AND WITHOUT STATUS MIGRAINOSUS, NOT INTRACTABLE: Chronic | ICD-10-CM

## 2022-12-21 DIAGNOSIS — G43.011 INTRACTABLE MIGRAINE WITHOUT AURA AND WITH STATUS MIGRAINOSUS: ICD-10-CM

## 2022-12-21 DIAGNOSIS — R73.9 HYPERGLYCEMIA: ICD-10-CM

## 2022-12-21 LAB
ANION GAP SERPL CALC-SCNC: 12 MMOL/L (ref 8–16)
BUN SERPL-MCNC: 25 MG/DL (ref 8–23)
CALCIUM SERPL-MCNC: 9.1 MG/DL (ref 8.7–10.5)
CHLORIDE SERPL-SCNC: 107 MMOL/L (ref 95–110)
CO2 SERPL-SCNC: 19 MMOL/L (ref 23–29)
CREAT SERPL-MCNC: 1.6 MG/DL (ref 0.5–1.4)
EST. GFR  (NO RACE VARIABLE): 36.2 ML/MIN/1.73 M^2
GLUCOSE SERPL-MCNC: 121 MG/DL (ref 70–110)
POTASSIUM SERPL-SCNC: 4.2 MMOL/L (ref 3.5–5.1)
SODIUM SERPL-SCNC: 138 MMOL/L (ref 136–145)

## 2022-12-21 PROCEDURE — 3079F PR MOST RECENT DIASTOLIC BLOOD PRESSURE 80-89 MM HG: ICD-10-PCS | Mod: HCNC,CPTII,S$GLB, | Performed by: PEDIATRICS

## 2022-12-21 PROCEDURE — 3075F SYST BP GE 130 - 139MM HG: CPT | Mod: HCNC,CPTII,S$GLB, | Performed by: PEDIATRICS

## 2022-12-21 PROCEDURE — 99214 PR OFFICE/OUTPT VISIT, EST, LEVL IV, 30-39 MIN: ICD-10-PCS | Mod: HCNC,S$GLB,, | Performed by: PEDIATRICS

## 2022-12-21 PROCEDURE — 80048 BASIC METABOLIC PNL TOTAL CA: CPT | Mod: HCNC | Performed by: PEDIATRICS

## 2022-12-21 PROCEDURE — 99999 PR PBB SHADOW E&M-EST. PATIENT-LVL V: CPT | Mod: PBBFAC,HCNC,, | Performed by: PEDIATRICS

## 2022-12-21 PROCEDURE — 3008F PR BODY MASS INDEX (BMI) DOCUMENTED: ICD-10-PCS | Mod: HCNC,CPTII,S$GLB, | Performed by: PEDIATRICS

## 2022-12-21 PROCEDURE — 3008F BODY MASS INDEX DOCD: CPT | Mod: HCNC,CPTII,S$GLB, | Performed by: PEDIATRICS

## 2022-12-21 PROCEDURE — 99214 OFFICE O/P EST MOD 30 MIN: CPT | Mod: HCNC,S$GLB,, | Performed by: PEDIATRICS

## 2022-12-21 PROCEDURE — 3079F DIAST BP 80-89 MM HG: CPT | Mod: HCNC,CPTII,S$GLB, | Performed by: PEDIATRICS

## 2022-12-21 PROCEDURE — 1159F PR MEDICATION LIST DOCUMENTED IN MEDICAL RECORD: ICD-10-PCS | Mod: HCNC,CPTII,S$GLB, | Performed by: PEDIATRICS

## 2022-12-21 PROCEDURE — 3075F PR MOST RECENT SYSTOLIC BLOOD PRESS GE 130-139MM HG: ICD-10-PCS | Mod: HCNC,CPTII,S$GLB, | Performed by: PEDIATRICS

## 2022-12-21 PROCEDURE — G0008 ADMIN INFLUENZA VIRUS VAC: HCPCS | Mod: HCNC,S$GLB,, | Performed by: PEDIATRICS

## 2022-12-21 PROCEDURE — 1160F PR REVIEW ALL MEDS BY PRESCRIBER/CLIN PHARMACIST DOCUMENTED: ICD-10-PCS | Mod: HCNC,CPTII,S$GLB, | Performed by: PEDIATRICS

## 2022-12-21 PROCEDURE — 83525 ASSAY OF INSULIN: CPT | Mod: HCNC | Performed by: PEDIATRICS

## 2022-12-21 PROCEDURE — 1159F MED LIST DOCD IN RCRD: CPT | Mod: HCNC,CPTII,S$GLB, | Performed by: PEDIATRICS

## 2022-12-21 PROCEDURE — 36415 COLL VENOUS BLD VENIPUNCTURE: CPT | Mod: HCNC | Performed by: PEDIATRICS

## 2022-12-21 PROCEDURE — 1160F RVW MEDS BY RX/DR IN RCRD: CPT | Mod: HCNC,CPTII,S$GLB, | Performed by: PEDIATRICS

## 2022-12-21 PROCEDURE — 90686 IIV4 VACC NO PRSV 0.5 ML IM: CPT | Mod: HCNC,S$GLB,, | Performed by: PEDIATRICS

## 2022-12-21 PROCEDURE — 99999 PR PBB SHADOW E&M-EST. PATIENT-LVL V: ICD-10-PCS | Mod: PBBFAC,HCNC,, | Performed by: PEDIATRICS

## 2022-12-21 PROCEDURE — 90686 FLU VACCINE (QUAD) GREATER THAN OR EQUAL TO 3YO PRESERVATIVE FREE IM: ICD-10-PCS | Mod: HCNC,S$GLB,, | Performed by: PEDIATRICS

## 2022-12-21 PROCEDURE — G0008 FLU VACCINE (QUAD) GREATER THAN OR EQUAL TO 3YO PRESERVATIVE FREE IM: ICD-10-PCS | Mod: HCNC,S$GLB,, | Performed by: PEDIATRICS

## 2022-12-21 RX ORDER — TOPIRAMATE 100 MG/1
300 TABLET, FILM COATED ORAL DAILY
Qty: 270 TABLET | Refills: 3 | Status: SHIPPED | OUTPATIENT
Start: 2022-12-21 | End: 2023-08-08

## 2022-12-21 NOTE — PROGRESS NOTES
Subjective:       Patient ID: Serene Rod is a 62 y.o. female.    Chief Complaint: Annual Exam    Serene Rod is a 62 y.o. female who presents to clinic for follow up    PMHx, PSHx, SocHx, and FHx reviewed and discussed with patient.    Patient Active Problem List:     Hyperglycemia: not at level of DM, no hyperglycemic Sx     Eczema: Tx topically      Essential hypertension: in digmed. Control good. No HTN Sx     Hyperlipidemia     Chronic Hypersensitivity Pneumonitis/respiratory failure with hypoxia/CRLD: on home o2, followed by pulm     Class 3 severe obesity: Pt trying to follow D&E, weight down slightly.      Migraine without aura and without status migrainosus, not intractable: followed by neurology. Combination Tx has cut down on freq and severity. Has been 5 days w/o migraine     Immunosuppressed status/Long term current use of systemic corticosteroids    Review of Systems   Constitutional:  Negative for activity change, appetite change, chills, diaphoresis, fatigue, fever and unexpected weight change.   HENT:  Negative for nasal congestion, ear pain, hearing loss, mouth sores, nosebleeds, postnasal drip, rhinorrhea, sneezing, sore throat and trouble swallowing.    Eyes:  Negative for photophobia, pain, discharge, redness and visual disturbance.   Respiratory:  Positive for shortness of breath and wheezing. Negative for cough, chest tightness and stridor.         Stable     Cardiovascular:  Negative for chest pain, palpitations and leg swelling.   Gastrointestinal:  Negative for blood in stool, constipation, diarrhea, nausea and vomiting.   Endocrine: Negative for polydipsia and polyuria.   Genitourinary:  Negative for decreased urine volume, difficulty urinating, dysuria, flank pain, frequency, hematuria, menstrual problem and urgency.   Musculoskeletal:  Negative for arthralgias, back pain, joint swelling, neck pain and neck stiffness.   Integumentary:  Negative for color change and rash.   Neurological:   Positive for headaches (see HPI). Negative for dizziness, syncope, speech difficulty, weakness and light-headedness.   Hematological:  Negative for adenopathy. Does not bruise/bleed easily.   Psychiatric/Behavioral:  Negative for confusion, decreased concentration, dysphoric mood, hallucinations, sleep disturbance and suicidal ideas. The patient is not nervous/anxious.    All other systems reviewed and are negative.      Objective:      Physical Exam  Vitals and nursing note reviewed.   Constitutional:       General: She is not in acute distress.     Appearance: She is well-developed.   Neck:      Thyroid: No thyromegaly.      Vascular: No JVD.   Cardiovascular:      Rate and Rhythm: Normal rate and regular rhythm.      Heart sounds: Normal heart sounds. No murmur heard.  Pulmonary:      Effort: Pulmonary effort is normal. No respiratory distress.      Breath sounds: Normal breath sounds. No wheezing or rales.   Abdominal:      General: There is no distension.      Palpations: Abdomen is soft. There is no mass.      Tenderness: There is no abdominal tenderness. There is no guarding.   Musculoskeletal:      Right lower leg: No edema.      Left lower leg: No edema.   Lymphadenopathy:      Cervical: No cervical adenopathy.   Skin:     Capillary Refill: Capillary refill takes less than 2 seconds.      Findings: No rash.   Neurological:      General: No focal deficit present.      Mental Status: She is alert and oriented to person, place, and time.      Cranial Nerves: No cranial nerve deficit.      Coordination: Coordination normal.   Psychiatric:         Mood and Affect: Mood normal.         Behavior: Behavior normal.         Thought Content: Thought content normal.         Judgment: Judgment normal.       Assessment:       Problem List Items Addressed This Visit       Essential hypertension (Chronic)    Relevant Orders    TSH    CBC Auto Differential    Long term current use of systemic corticosteroids (Chronic)     Migraine without aura and without status migrainosus, not intractable (Chronic)    Relevant Medications    topiramate (TOPAMAX) 100 MG tablet    Chronic Hypersensitivity Pneumonitis    Class 3 severe obesity due to excess calories without serious comorbidity with body mass index (BMI) of 40.0 to 44.9 in adult    Hyperglycemia    Hyperlipidemia    Relevant Orders    Lipid Panel     Other Visit Diagnoses       Well adult exam    -  Primary    Relevant Orders    Comprehensive Metabolic Panel    Elevated glucose        Relevant Orders    Hemoglobin A1C    Insulin, Random    Insulin, Random    Basic Metabolic Panel    Encounter for screening mammogram for malignant neoplasm of breast        Relevant Orders    Mammo Digital Screening Bilat w/ Kamran    Well woman exam        Relevant Orders    Ambulatory referral/consult to Gynecology    Intractable migraine without aura and with status migrainosus        Relevant Medications    topiramate (TOPAMAX) 100 MG tablet            Plan:     Well adult exam  -     Comprehensive Metabolic Panel; Future; Expected date: 12/21/2022    Class 3 severe obesity due to excess calories without serious comorbidity with body mass index (BMI) of 40.0 to 44.9 in adult    Chronic Hypersensitivity Pneumonitis    Essential hypertension  -     TSH; Future; Expected date: 12/21/2022  -     CBC Auto Differential; Future; Expected date: 12/21/2022    Hyperlipidemia, unspecified hyperlipidemia type  -     Lipid Panel; Future; Expected date: 12/21/2022    Hyperglycemia    Long term current use of systemic corticosteroids    Migraine without aura and without status migrainosus, not intractable    Elevated glucose  -     Hemoglobin A1C; Future; Expected date: 12/21/2022  -     Insulin, Random; Future; Expected date: 12/21/2022  -     Insulin, Random; Future; Expected date: 12/21/2022  -     Basic Metabolic Panel; Future; Expected date: 12/21/2022    Encounter for screening mammogram for malignant neoplasm  of breast  -     Mammo Digital Screening Bilat w/ Kamran; Future; Expected date: 12/21/2022    Well woman exam  -     Ambulatory referral/consult to Gynecology; Future; Expected date: 12/28/2022    Intractable migraine without aura and with status migrainosus  -     topiramate (TOPAMAX) 100 MG tablet; Take 3 tablets (300 mg total) by mouth once daily.  Dispense: 270 tablet; Refill: 3    Other orders  -     Influenza - Quadrivalent (PF)        D&E and weight loss discussed. We have discussed options for long term weight control. Pt odes not wish to consider bariatrics. She is practically already on contrave or qysmia as she is on wellbutrin and topamax. GLP1 inhibitor and its RB/CI/SE discussed with Pt.  If she shows preD or DM will be able to get approved through insurance. Try to increase topamax for improved HA controled as well as appetite Supressant. Await labs. Follow up in 3mo. Vaccines discussed.     Scribe Attestation:   I, Isaias Cartagena, am scribing for, and in the presence of, Dr. Chapin Miranda Jr. I performed the above scribed service and the documentation accurately describes the services I performed. I attest to the accuracy of the note.    I, Dr. Chapin Miranda Jr, reviewed documentation as scribed above. I personally performed the services described in this documentation.  I agree that the record reflects my personal performance and is accurate and complete. Chapin Miranda Jr., MD.  12/21/2022

## 2022-12-22 DIAGNOSIS — R73.09 ELEVATED GLUCOSE: Primary | ICD-10-CM

## 2022-12-22 LAB
INSULIN COLLECTION INTERVAL: NORMAL
INSULIN SERPL-ACNC: 23.5 UU/ML

## 2022-12-22 RX ORDER — DULAGLUTIDE 0.75 MG/.5ML
0.75 INJECTION, SOLUTION SUBCUTANEOUS
Qty: 4 PEN | Refills: 11 | Status: SHIPPED | OUTPATIENT
Start: 2022-12-22 | End: 2023-12-22

## 2022-12-28 ENCOUNTER — OFFICE VISIT (OUTPATIENT)
Dept: OBSTETRICS AND GYNECOLOGY | Facility: CLINIC | Age: 62
End: 2022-12-28
Payer: MEDICARE

## 2022-12-28 VITALS
BODY MASS INDEX: 40.49 KG/M2 | WEIGHT: 235.88 LBS | DIASTOLIC BLOOD PRESSURE: 90 MMHG | SYSTOLIC BLOOD PRESSURE: 146 MMHG

## 2022-12-28 DIAGNOSIS — A60.04 TYPE 2 HSV INFECTION OF VULVOVAGINAL REGION: ICD-10-CM

## 2022-12-28 DIAGNOSIS — Z12.4 ENCOUNTER FOR SCREENING FOR CERVICAL CANCER: ICD-10-CM

## 2022-12-28 DIAGNOSIS — Z01.419 WELL WOMAN EXAM WITH ROUTINE GYNECOLOGICAL EXAM: Primary | ICD-10-CM

## 2022-12-28 DIAGNOSIS — Z91.89 ENCOUNTER FOR GYNECOLOGIC EXAMINATION FOR HIGH-RISK PATIENT COVERED BY MEDICARE: ICD-10-CM

## 2022-12-28 PROCEDURE — 3008F BODY MASS INDEX DOCD: CPT | Mod: HCNC,CPTII,S$GLB, | Performed by: NURSE PRACTITIONER

## 2022-12-28 PROCEDURE — 87624 HPV HI-RISK TYP POOLED RSLT: CPT | Mod: HCNC | Performed by: NURSE PRACTITIONER

## 2022-12-28 PROCEDURE — 3077F PR MOST RECENT SYSTOLIC BLOOD PRESSURE >= 140 MM HG: ICD-10-PCS | Mod: HCNC,CPTII,S$GLB, | Performed by: NURSE PRACTITIONER

## 2022-12-28 PROCEDURE — 99999 PR PBB SHADOW E&M-EST. PATIENT-LVL IV: CPT | Mod: PBBFAC,HCNC,, | Performed by: NURSE PRACTITIONER

## 2022-12-28 PROCEDURE — 1160F RVW MEDS BY RX/DR IN RCRD: CPT | Mod: HCNC,CPTII,S$GLB, | Performed by: NURSE PRACTITIONER

## 2022-12-28 PROCEDURE — 3077F SYST BP >= 140 MM HG: CPT | Mod: HCNC,CPTII,S$GLB, | Performed by: NURSE PRACTITIONER

## 2022-12-28 PROCEDURE — 88175 CYTOPATH C/V AUTO FLUID REDO: CPT | Mod: HCNC | Performed by: NURSE PRACTITIONER

## 2022-12-28 PROCEDURE — 1160F PR REVIEW ALL MEDS BY PRESCRIBER/CLIN PHARMACIST DOCUMENTED: ICD-10-PCS | Mod: HCNC,CPTII,S$GLB, | Performed by: NURSE PRACTITIONER

## 2022-12-28 PROCEDURE — 99999 PR PBB SHADOW E&M-EST. PATIENT-LVL IV: ICD-10-PCS | Mod: PBBFAC,HCNC,, | Performed by: NURSE PRACTITIONER

## 2022-12-28 PROCEDURE — 3080F DIAST BP >= 90 MM HG: CPT | Mod: HCNC,CPTII,S$GLB, | Performed by: NURSE PRACTITIONER

## 2022-12-28 PROCEDURE — 1159F MED LIST DOCD IN RCRD: CPT | Mod: HCNC,CPTII,S$GLB, | Performed by: NURSE PRACTITIONER

## 2022-12-28 PROCEDURE — 1159F PR MEDICATION LIST DOCUMENTED IN MEDICAL RECORD: ICD-10-PCS | Mod: HCNC,CPTII,S$GLB, | Performed by: NURSE PRACTITIONER

## 2022-12-28 PROCEDURE — 3080F PR MOST RECENT DIASTOLIC BLOOD PRESSURE >= 90 MM HG: ICD-10-PCS | Mod: HCNC,CPTII,S$GLB, | Performed by: NURSE PRACTITIONER

## 2022-12-28 PROCEDURE — 3008F PR BODY MASS INDEX (BMI) DOCUMENTED: ICD-10-PCS | Mod: HCNC,CPTII,S$GLB, | Performed by: NURSE PRACTITIONER

## 2022-12-28 RX ORDER — VALACYCLOVIR HYDROCHLORIDE 500 MG/1
500 TABLET, FILM COATED ORAL DAILY
Qty: 30 TABLET | Refills: 11 | Status: SHIPPED | OUTPATIENT
Start: 2022-12-28 | End: 2023-12-28

## 2022-12-28 NOTE — PROGRESS NOTES
"Subjective:       Patient ID: Serene Rod is a 62 y.o. female.    Chief Complaint:  Well Woman    No LMP recorded. Patient is postmenopausal.  History of Present Illness  Annual Exam-Postmenopausal  Patient presents for annual exam. The patient has no complaints today. The patient is not currently sexually active. GYN screening history: last pap: "2016--normal" and last mammogram: scheduled to be completed on 1/31/22 . History of abnormal pap smear in 1990, normal since. The patient is not taking hormone replacement therapy. Patient denies post-menopausal vaginal bleeding. The patient wears seatbelts: yes. The patient participates in regular exercise: no. Has the patient ever been transfused or tattooed?: yes. The patient reports that there is not domestic violence in her life. History of HSV 2. Infrequent outbreaks although is in new relationship and is requesting suppressive therapy to reduce risk of transmission to partner.     OB History   No obstetric history on file.       Review of Systems  Review of Systems   Constitutional:  Negative for appetite change, fatigue, fever and unexpected weight change.   Eyes:  Negative for visual disturbance.   Cardiovascular:  Negative for chest pain.   Gastrointestinal:  Negative for abdominal pain, bloating, constipation, diarrhea, nausea and vomiting.   Genitourinary:  Negative for bladder incontinence, dysmenorrhea, dyspareunia, dysuria, flank pain, frequency, genital sores, menorrhagia, menstrual problem, pelvic pain, urgency, vaginal bleeding, vaginal discharge, vaginal pain, postcoital bleeding, vaginal dryness and vaginal odor.   Integumentary:  Negative for rash, acne, mole/lesion, breast mass, nipple discharge, breast skin changes and breast tenderness.   Neurological:  Negative for syncope and headaches.   Hematological:  Negative for adenopathy. Does not bruise/bleed easily.   All other systems reviewed and are negative.  Breast: Positive for breast self " exam.Negative for asymmetry, lump, mass, nipple discharge, skin changes and tenderness         Objective:      Physical Exam:   Constitutional: She is oriented to person, place, and time. She appears well-developed and well-nourished.    HENT:   Head: Normocephalic and atraumatic.    Eyes: Pupils are equal, round, and reactive to light. Conjunctivae and EOM are normal.     Cardiovascular:  Normal rate and regular rhythm.             Pulmonary/Chest: Effort normal. Right breast exhibits no inverted nipple, no mass, no nipple discharge, no skin change, no tenderness, no bleeding and no swelling. Left breast exhibits no inverted nipple, no mass, no nipple discharge, no skin change, no tenderness, no bleeding and no swelling. Breasts are symmetrical.        Abdominal: Soft. Hernia confirmed negative in the right inguinal area and confirmed negative in the left inguinal area.     Genitourinary:    Inguinal canal, vagina, uterus, right adnexa, left adnexa and rectum normal.      Pelvic exam was performed with patient supine.   The external female genitalia was normal.   Genitalia hair distrobution normal .   Labial bartholins normal.There is no rash, tenderness, lesion or injury on the right labia. There is no rash, tenderness, lesion or injury on the left labia. Cervix is normal. No erythema,  no vaginal discharge, bleeding, rectocele, cystocele or unspecified prolapse of vaginal walls in the vagina. Cervix exhibits no motion tenderness and no friability. Uterus is not tender.           Musculoskeletal: Normal range of motion and moves all extremeties.      Lymphadenopathy: No inguinal adenopathy noted on the right or left side.    Neurological: She is alert and oriented to person, place, and time.    Skin: Skin is warm and dry. No rash noted. No erythema.    Psychiatric: She has a normal mood and affect. Her behavior is normal. Judgment and thought content normal.          Assessment:     1. Well woman exam with routine  gynecological exam    2. Encounter for gynecologic examination for high-risk patient covered by Medicare    3. Encounter for screening for cervical cancer    4. 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      Follow up with me in 1 year for annual well woman exam.

## 2023-01-02 ENCOUNTER — PATIENT MESSAGE (OUTPATIENT)
Dept: OPHTHALMOLOGY | Facility: CLINIC | Age: 63
End: 2023-01-02
Payer: MEDICARE

## 2023-01-09 ENCOUNTER — PROCEDURE VISIT (OUTPATIENT)
Dept: NEUROLOGY | Facility: CLINIC | Age: 63
End: 2023-01-09
Payer: MEDICARE

## 2023-01-09 ENCOUNTER — IMMUNIZATION (OUTPATIENT)
Dept: PHARMACY | Facility: CLINIC | Age: 63
End: 2023-01-09
Payer: MEDICARE

## 2023-01-09 VITALS
SYSTOLIC BLOOD PRESSURE: 155 MMHG | HEIGHT: 64 IN | OXYGEN SATURATION: 99 % | RESPIRATION RATE: 16 BRPM | WEIGHT: 231 LBS | DIASTOLIC BLOOD PRESSURE: 88 MMHG | BODY MASS INDEX: 39.44 KG/M2 | HEART RATE: 82 BPM

## 2023-01-09 DIAGNOSIS — G43.009 MIGRAINE WITHOUT AURA AND WITHOUT STATUS MIGRAINOSUS, NOT INTRACTABLE: Primary | Chronic | ICD-10-CM

## 2023-01-09 DIAGNOSIS — Z23 NEED FOR VACCINATION: Primary | ICD-10-CM

## 2023-01-09 PROCEDURE — 64615 PR CHEMODENERVATION OF MUSCLE FOR CHRONIC MIGRAINE: ICD-10-PCS | Mod: HCNC,S$GLB,, | Performed by: PSYCHIATRY & NEUROLOGY

## 2023-01-09 PROCEDURE — 64615 CHEMODENERV MUSC MIGRAINE: CPT | Mod: HCNC,S$GLB,, | Performed by: PSYCHIATRY & NEUROLOGY

## 2023-01-09 NOTE — PROCEDURES
Procedures        BOTOX  HAS PROVEN VERY EFFECTIVE IN SIGNIFICANTLY REDUCING THE SEVERITY AND FREQUENCY OF MIGRAINE HEADACHES                     1. PROCEDURE: Botox injections      2. INDICATION: Intractable Migraine      3. TIME OUT: The procedure was discussed in details with the patient and the consent form was signed. The patient verbalized understanding of the procedure . I discussed the risks that may include serious immune reaction and distant spread that could results in loss of breathing. Other side effects may include droopy eyelids, trouble swallowing and cardiac arrhythmias. It was also stressed that it is contraindicated in pregnancy.      3. COURSE:   The standard protocol was followed. the procedure was very smooth.      4. COMPLICATIONS: None. The patient tolerated the procedure very well.      5. AFTERCARE: I encouraged the patient to use ice if the sites of injection get tender and call me with any problems or complications.               As per the standard protocol, a total 155 units were injected in 31 sites.            RT  5 units in 1 site     LT  5 units in 1 site      Procerus 5 units in 1 site      RT Frontalis 10 units in 2 sites      LT Frontalis 10 units in 2 site      RT Temporalis 20 units in 4 sites     LT Temporalis 20 units in 4 sites     RT Occipitalis 15 units in 3 sites     LT Occipitalis 15  units in 3 sites      RT Cervical Paraspinals 10 units in 2 sites     LT Cervical Paraspinals 10 units in 2 sites     RT Trapezius 15 units in 3 sites     LT Trapezius 15 units in 3 sites         Per the standard of care protocol we use 155 units and waste 45 units. I gave the patient the option of following the standard protocol vs.using the extra 45 units to target areas where the pain is maximum which could potentially provide extra help with headache control. I explained to the patient that this will be an off-label use, not the standard protocol, not evidence-based  and could result in more pronounced side effects. The patient verbalized full understanding of all the facts and the risks and benefits and elected to use the extra 45 units for the following sites:        RT  10 units in 2 sites     LT  10 units in 2 sites      Procerus 5 units in 1 site      RT Frontalis 10 units in 2 sites      LT Frontalis 10 units in 2 site

## 2023-01-10 ENCOUNTER — CLINICAL SUPPORT (OUTPATIENT)
Dept: PULMONOLOGY | Facility: CLINIC | Age: 63
End: 2023-01-10
Payer: MEDICARE

## 2023-01-10 VITALS — HEIGHT: 64 IN | WEIGHT: 231.06 LBS | BODY MASS INDEX: 39.45 KG/M2

## 2023-01-10 DIAGNOSIS — J96.11 CHRONIC RESPIRATORY FAILURE WITH HYPOXIA: ICD-10-CM

## 2023-01-10 PROCEDURE — 94618 PULMONARY STRESS TESTING: CPT | Mod: HCNC,S$GLB,, | Performed by: INTERNAL MEDICINE

## 2023-01-10 PROCEDURE — 94618 PULMONARY STRESS TESTING: ICD-10-PCS | Mod: HCNC,S$GLB,, | Performed by: INTERNAL MEDICINE

## 2023-01-10 NOTE — PROCEDURES
"[facility] - Pulmonary Function  Six Minute Walk   SUMMARY   Ordering Provider: Chris Box MD   Interpreting Provider: Chris Box MD  Performing nurse/tech/RT: VT, RT  Diagnosis:  (Chronic respiratory failure with hypoxia)  Height: 5' 4" (162.6 cm)  Weight: 104.8 kg (231 lb 0.7 oz)  BMI (Calculated): 39.6  Phase Oxygen Assessment Supplemental O2 Heart   Rate Blood Pressure Sophie Dyspnea Scale Rating   Resting 95 % Room Air 85 bpm 136/89 0   Exercise        Minute        1 88 % Room Air 124 bpm     2 88 % 2 L/M 134 bpm     3 88 % 3 L/M 137 bpm     4 88 % (increased NC to 6L with spo2 increased to 90%) 4 L/M 144 bpm     5 90 % 6 L/M 145 bpm     6  90 % 6 L/M 149 bpm 145/63 3   Recovery        Minute        1 96 % 6 L/M 128 bpm     2 97 % 6 L/M 111 bpm     3 97 % 6 L/M 107 bpm     4 97 % 6 L/M 95 bpm 162/70 1     Six Minute Walk Summary  6MWT Status: completed without stopping  Patient Reported: Dyspnea     Interpretation:  Did the patient stop or pause?: No               Total Time Walked (Calculated): 360 seconds  Final Partial Lap Distance (feet): 0 feet  Total Distance Meters (Calculated): 304.8 meters  Predicted Distance Meters (Calculated): 411.73 meters  Percentage of Predicted (Calculated): 74.03  Peak VO2 (Calculated): 13.12  Mets: 3.75  Has The Patient Had a Previous Six Minute Walk Test?: Yes       Previous 6MWT Results  Has The Patient Had a Previous Six Minute Walk Test?: Yes  Date of Previous Test: 05/05/22  Total Time Walked: 360 seconds  Total Distance (meters): 213.36  Predicted Distance (meters): 408.81 meters  Percentage of Predicted: 52.19  Percent Change (Calculated): -0.43    " testosterone injection given. Verbal order for injection from Dr. Thompson. Patient aware to return to clinic in 2 weeks for next injection. Patient tolerated without incident. See medication administration record ( MAR ).

## 2023-01-11 ENCOUNTER — OFFICE VISIT (OUTPATIENT)
Dept: PULMONOLOGY | Facility: CLINIC | Age: 63
End: 2023-01-11
Payer: MEDICARE

## 2023-01-11 DIAGNOSIS — Z79.899 HIGH RISK MEDICATION USE: ICD-10-CM

## 2023-01-11 DIAGNOSIS — Z79.899 ON CELLCEPT THERAPY: ICD-10-CM

## 2023-01-11 DIAGNOSIS — J98.4 SMALL AIRWAYS DISEASE: ICD-10-CM

## 2023-01-11 DIAGNOSIS — R09.02 EXERCISE HYPOXEMIA: ICD-10-CM

## 2023-01-11 DIAGNOSIS — J98.4 RESTRICTIVE LUNG DISEASE: ICD-10-CM

## 2023-01-11 DIAGNOSIS — J98.4 CRLD (CHRONIC RESTRICTIVE LUNG DISEASE): ICD-10-CM

## 2023-01-11 DIAGNOSIS — J67.9 HYPERSENSITIVITY PNEUMONITIS: Primary | ICD-10-CM

## 2023-01-11 DIAGNOSIS — Z79.52 CURRENT CHRONIC USE OF SYSTEMIC STEROIDS: ICD-10-CM

## 2023-01-11 PROCEDURE — 99213 OFFICE O/P EST LOW 20 MIN: CPT | Mod: HCNC,95,, | Performed by: INTERNAL MEDICINE

## 2023-01-11 PROCEDURE — 1159F MED LIST DOCD IN RCRD: CPT | Mod: HCNC,CPTII,95, | Performed by: INTERNAL MEDICINE

## 2023-01-11 PROCEDURE — 99213 PR OFFICE/OUTPT VISIT, EST, LEVL III, 20-29 MIN: ICD-10-PCS | Mod: HCNC,95,, | Performed by: INTERNAL MEDICINE

## 2023-01-11 PROCEDURE — 1159F PR MEDICATION LIST DOCUMENTED IN MEDICAL RECORD: ICD-10-PCS | Mod: HCNC,CPTII,95, | Performed by: INTERNAL MEDICINE

## 2023-01-11 PROCEDURE — 1160F RVW MEDS BY RX/DR IN RCRD: CPT | Mod: HCNC,CPTII,95, | Performed by: INTERNAL MEDICINE

## 2023-01-11 PROCEDURE — 1160F PR REVIEW ALL MEDS BY PRESCRIBER/CLIN PHARMACIST DOCUMENTED: ICD-10-PCS | Mod: HCNC,CPTII,95, | Performed by: INTERNAL MEDICINE

## 2023-01-11 RX ORDER — PREDNISONE 5 MG/1
5 TABLET ORAL DAILY
Qty: 90 TABLET | Refills: 2 | Status: SHIPPED | OUTPATIENT
Start: 2023-01-11

## 2023-01-11 NOTE — PROGRESS NOTES
Pulmonary Outpatient Follow Up Visit     Subjective:   The patient location is: Home  The chief complaint leading to consultation is:  Visit type: Virtual visit with synchronous audio and video  Total time spent with patient: 20 min   Each patient to whom he or she provides medical services by telemedicine is:  (1) informed of the relationship between the physician and patient and the respective role of any other health care provider with respect to management of the patient; and (2) notified that he or she may decline to receive medical services by telemedicine and may withdraw from such care at any time.  Total time spent in face to face counseling and coordination of care -  15minutes over 50% of time was used in discussion of prognosis, risks, benefits of treatment, instructions and compliance with regimen .      Patient ID: Serene Rod is a 62 y.o. female.    Chief Complaint: Interstitial Lung Disease      HPI        62-year-old female patient presenting for six-month follow-up.    01/11/2023 overall stable no hospital admissions or ER visits or respiratory exacerbation.      On CellCept 250 mg 2 tablets twice daily and prednisone 10 mg every other day.      Favorable trend on spirometry of FVC and DLCO.          Initially evaluated her March 2021, she is known with history of chronic hypersensitivity pneumonitis diagnosed via surgical lung biopsy in 2016 in [place] in [place] has been since then on prednisone only quit prednisone for about 6 months prior to moving to [place] and being evaluated by my colleague Dr Pradhan December 2018.    On prednisone 10 mg every other day in addition to CellCept 50 mg 2 tablets twice daily.    Overall stable.  SOB on exertion.  At baseline.      Started CellCept after visit with me March 2021.      Did not tolerate Imuran.    CellCept was not covered by insurance.    Overall feels well.  On O2 24 hr.    Preop ABG spirometry and chest  x-ray reviewed chest x-ray reviewed stable.      Review of Systems   Constitutional:  Positive for weight loss and weakness. Negative for fever and chills.   HENT:  Negative for nosebleeds.    Eyes:  Negative for redness.   Respiratory:  Positive for cough, shortness of breath, dyspnea on extertion and use of rescue inhaler. Negative for choking.    Cardiovascular:  Negative for chest pain.   Genitourinary:  Negative for hematuria.   Endocrine:  Negative for cold intolerance.    Musculoskeletal:  Positive for arthralgias.   Skin:  Negative for rash.   Gastrointestinal:  Negative for vomiting.   Neurological:  Negative for syncope.   Hematological:  Negative for adenopathy.   Psychiatric/Behavioral:  Negative for confusion.      Outpatient Encounter Medications as of 1/11/2023   Medication Sig Dispense Refill    benzonatate (TESSALON) 100 MG capsule TAKE 1 CAPSULE THREE TIMES DAILY AS NEEDED FOR COUGH 120 capsule 3    buPROPion (WELLBUTRIN XL) 300 MG 24 hr tablet Take 1 tablet (300 mg total) by mouth once daily. 90 tablet 4    butalbital-acetaminophen-caffeine -40 mg (FIORICET, ESGIC) -40 mg per tablet TAKE 1 TABLET ONE TIME AS NEEDED FOR PAIN (MAX 2 TO 3 TIMES PER WEEK) 10 tablet 2    dulaglutide (TRULICITY) 0.75 mg/0.5 mL pen injector Inject 0.75 mg into the skin every 7 days. 4 pen 11    eletriptan (RELPAX) 40 MG tablet TAKE 1 TABLET ONCE AS NEEDED MAY REPEAT IN 2 HOURS IF NECESSARY (MAX 3 TIMES A WEEK). 9 tablet 3    EScitalopram oxalate (LEXAPRO) 20 MG tablet Take 1 tablet (20 mg total) by mouth once daily. 90 tablet 4    fluticasone furoate-vilanteroL (BREO ELLIPTA) 100-25 mcg/dose diskus inhaler Inhale 1 puff into the lungs once daily. 30 each 11    fluticasone propionate (FLONASE) 50 mcg/actuation nasal spray Spray 2 sprays (100 mcg total) by each nostril route once daily. 48 g 4    gabapentin (NEURONTIN) 300 MG capsule Take 1 capsule (300 mg total) by mouth every evening. 90 capsule 3     galcanezumab-gnlm (EMGALITY PEN) 120 mg/mL PnIj Inject 120 mg into the skin every 28 days. 3 mL 11    methocarbamoL (ROBAXIN) 750 MG Tab Take 1 tablet (750 mg total) by mouth 3 (three) times daily. 60 tablet 3    mycophenolate (CELLCEPT) 250 mg Cap TAKE 2 CAPSULES TWICE DAILY 360 capsule 0    naratriptan (AMERGE) 2.5 MG tablet Take 1 tablet (2.5 mg total) by mouth once as needed for Migraine (Max 2-3 times a week). 9 tablet 3    ondansetron (ZOFRAN-ODT) 4 MG TbDL Take 2 tablets (8 mg total) by mouth every 12 (twelve) hours as needed. 9 tablet 3    OXYGEN-AIR DELIVERY SYSTEMS MISC by Misc.(Non-Drug; Combo Route) route as needed (on exertion and with sleep).      predniSONE (DELTASONE) 5 MG tablet Take 1 tablet (5 mg total) by mouth once daily. 90 tablet 2    [] sars-cov-2, covid-19 omicron, (MODERNA COVID BIVAL,6Y UP,,PF,) 50 mcg/0.5 mL injection Inject 0.5 mLs into the muscle once. for 1 dose 0.5 mL 0    tiZANidine (ZANAFLEX) 4 MG tablet TAKE 1 TABLET NIGHTLY AS NEEDED (MIGRAINE). 30 tablet 3    topiramate (TOPAMAX) 100 MG tablet Take 3 tablets (300 mg total) by mouth once daily. 270 tablet 3    triamcinolone acetonide 0.1% (KENALOG) 0.1 % cream Apply topically 2 (two) times daily. 45 g 5    ubrogepant (UBRELVY) 100 mg tablet Take 1 tablet (100 mg total) by mouth as needed for Migraine (do not exceed 2-3 doses within 1 week). 8 tablet 5    valACYclovir (VALTREX) 500 MG tablet Take 1 tablet (500 mg total) by mouth once daily. 30 tablet 11    verapamiL (CALAN-SR) 240 MG CR tablet Take 1 tablet (240 mg total) by mouth once daily. (Patient taking differently: Take 240 mg by mouth nightly.) 90 tablet 3    [DISCONTINUED] predniSONE (DELTASONE) 10 MG tablet Take 10 mg by mouth every other day.      [DISCONTINUED] predniSONE (DELTASONE) 50 MG Tab Take 1 tablet (50 mg total) by mouth once daily. 5 tablet 0     Facility-Administered Encounter Medications as of 2023   Medication Dose Route Frequency Provider Last  Rate Last Admin    onabotulinumtoxina injection 200 Units  200 Units Intramuscular Q90 Days Rodrigo Carballo MD   200 Units at 01/09/23 1356       Objective:     Vital Signs (Most Recent)   ]  Wt Readings from Last 2 Encounters:   01/10/23 104.8 kg (231 lb 0.7 oz)   01/09/23 104.8 kg (231 lb)       Physical Exam   Constitutional: She is oriented to person, place, and time. She appears well-developed.   HENT:   Head: Normocephalic.   Pulmonary/Chest: No stridor. No respiratory distress.   Neurological: She is alert and oriented to person, place, and time.   Psychiatric: She has a normal mood and affect. Her behavior is normal. Judgment and thought content normal.   Nursing note and vitals reviewed.    Laboratory  Lab Results   Component Value Date    WBC 15.77 (H) 05/05/2022    RBC 4.33 05/05/2022    HGB 13.6 05/05/2022    HCT 44.3 05/05/2022     (H) 05/05/2022    MCH 31.4 (H) 05/05/2022    MCHC 30.7 (L) 05/05/2022    RDW 12.5 05/05/2022     05/05/2022    MPV 10.0 05/05/2022    GRAN 13.2 (H) 05/05/2022    GRAN 83.9 (H) 05/05/2022    LYMPH 1.9 05/05/2022    LYMPH 11.9 (L) 05/05/2022    MONO 0.3 05/05/2022    MONO 2.0 (L) 05/05/2022    EOS 0.2 05/05/2022    BASO 0.11 05/05/2022    EOSINOPHIL 1.0 05/05/2022    BASOPHIL 0.7 05/05/2022       BMP  Lab Results   Component Value Date     12/21/2022    K 4.2 12/21/2022     12/21/2022    CO2 19 (L) 12/21/2022    BUN 25 (H) 12/21/2022    CREATININE 1.6 (H) 12/21/2022    CALCIUM 9.1 12/21/2022    ANIONGAP 12 12/21/2022    ESTGFRAFRICA 46.8 (A) 05/05/2022    EGFRNONAA 40.6 (A) 05/05/2022    AST 16 05/05/2022    ALT 12 05/05/2022    PROT 6.9 05/05/2022       No results found for: BNP    No results found for: TSH    Lab Results   Component Value Date    SEDRATE 13 12/22/2018       Lab Results   Component Value Date    CRP 5.9 12/22/2018     Lab Results   Component Value Date    IGE <35 03/12/2021        No results found for: ASPERGILLUS  No results found for:  AFUMIGATUSCL     No results found for: ACE    Latest Reference Range & Units 05/09/22 10:59   POC PH 7.35 - 7.45  7.339 (L)   POC PCO2 35 - 45 mmHg 34.3 (L)   POC PO2 80 - 100 mmHg 64 (L)   POC BE -2 to 2 mmol/L -7   POC HCO3 24 - 28 mmol/L 18.5 (L)   POC SATURATED O2 95 - 100 % 91 (L)   FiO2  21   Sample  ARTERIAL   DelSys  Room Air   Allens Test  Pass   Site  RR   Mode  SPONT   (L): Data is abnormally low  Diagnostic Results:  I have personally reviewed today the following studies:      Spirometry May 2022 stable restriction improved compared to 2019 prior to resuming prednisone and CellCept.      6 minute walking test May 2022 severe hypoxemia 87% at rest needed 4 L on exertion.    Spirometry with DLCO 11/15/2021    stable restriction.    No deterioration on low-dose of prednisone.      CT chest without contrast March 12, 2021    COMPARISON:  February 1, 2019     FINDINGS:  There is a similar distribution of reticular interstitial changes throughout the lungs with significant improvement of intervening ground-glass opacities and less prominent reticular interstitial changes noted.  There are no new pulmonary opacities.  There are pleural effusions or pneumothorax.     Subcentimeter mediastinal lymph nodes again seen.  There are no hilar or mediastinal masses or abnormal lymph nodes by size criteria.     The airways are patent.  The thyroid gland is normal.  The esophagus is normal.     Incompletely evaluated left renal cyst measuring at least 7 cm in size again seen.  The upper abdominal organs are otherwise unchanged.     Negative for osseous lesions. Similar degree of multilevel marginal spondylosis, especially of the cysts lower cervical region.  Stable leads in the right back soft tissues.     Impression:     1.  The degree in distribution of reticular interstitial changes and ground-glass opacities throughout the lungs have significantly improved.  Negative for new pulmonary opacities.     2.  Numerous  stable findings as noted above.  Negative for acute process.            Assessment/Plan:   Hypersensitivity pneumonitis  -     Spirometry Without Bronchodilator & DLCO; Future; Expected date: 07/11/2023  -     X-Ray Chest PA And Lateral; Future; Expected date: 01/11/2023  -     predniSONE (DELTASONE) 5 MG tablet; Take 1 tablet (5 mg total) by mouth once daily.  Dispense: 90 tablet; Refill: 2  -     OXYGEN FOR HOME USE    CRLD (chronic restrictive lung disease)  -     OXYGEN FOR HOME USE    Restrictive lung disease  -     Spirometry Without Bronchodilator & DLCO; Future; Expected date: 07/11/2023  -     X-Ray Chest PA And Lateral; Future; Expected date: 01/11/2023  -     predniSONE (DELTASONE) 5 MG tablet; Take 1 tablet (5 mg total) by mouth once daily.  Dispense: 90 tablet; Refill: 2  -     OXYGEN FOR HOME USE    Small airways disease  -     Spirometry Without Bronchodilator & DLCO; Future; Expected date: 07/11/2023    On Cellcept therapy    Current chronic use of systemic steroids    High risk medication use    Exercise hypoxemia  -     OXYGEN FOR HOME USE       Continue albuterol p.r.n. continue Breo 100 mcg 1 puff daily.    Continue CellCept 500 mg twice daily.  Monitor CBC    Prednisone decreased to 5 mg every other day.  Monitor symptoms will consider if patient relapses increasing CellCept to 1000 b.i.d.    Trend DLCO and FVC    Use oxygen on exertion portable O2 concentrator ordered      Follow up in about 6 months (around 7/11/2023).    This note was prepared using voice recognition system and is likely to have sound alike errors that may have been overlooked even after proof reading.  Please call me with any questions    Discussed diagnosis, its evaluation, treatment and usual course. All questions answered.      Chrsi Box MD

## 2023-01-13 ENCOUNTER — PATIENT MESSAGE (OUTPATIENT)
Dept: PULMONOLOGY | Facility: CLINIC | Age: 63
End: 2023-01-13
Payer: MEDICARE

## 2023-01-13 DIAGNOSIS — J84.9 ILD (INTERSTITIAL LUNG DISEASE): Primary | ICD-10-CM

## 2023-01-20 ENCOUNTER — PATIENT MESSAGE (OUTPATIENT)
Dept: PULMONOLOGY | Facility: CLINIC | Age: 63
End: 2023-01-20
Payer: MEDICARE

## 2023-01-23 DIAGNOSIS — J84.9 ILD (INTERSTITIAL LUNG DISEASE): Primary | ICD-10-CM

## 2023-01-24 ENCOUNTER — PATIENT MESSAGE (OUTPATIENT)
Dept: PULMONOLOGY | Facility: CLINIC | Age: 63
End: 2023-01-24
Payer: MEDICARE

## 2023-02-06 ENCOUNTER — TELEPHONE (OUTPATIENT)
Dept: ADMINISTRATIVE | Facility: HOSPITAL | Age: 63
End: 2023-02-06
Payer: MEDICARE

## 2023-02-07 DIAGNOSIS — Z00.00 ENCOUNTER FOR MEDICARE ANNUAL WELLNESS EXAM: ICD-10-CM

## 2023-02-09 DIAGNOSIS — Z00.00 ENCOUNTER FOR MEDICARE ANNUAL WELLNESS EXAM: ICD-10-CM

## 2023-03-01 ENCOUNTER — TELEPHONE (OUTPATIENT)
Dept: NEUROLOGY | Facility: CLINIC | Age: 63
End: 2023-03-01
Payer: MEDICARE

## 2023-03-01 NOTE — TELEPHONE ENCOUNTER
----- Message from Alvin Lizama sent at 3/1/2023  9:09 AM CST -----  Contact: [phone]  Kaleb with [facility] pharmacy would like to consult with a nurse in regards to the prescriptions. Please call back at [phone]. Thanks r/s

## 2023-03-02 ENCOUNTER — PATIENT MESSAGE (OUTPATIENT)
Dept: INTERNAL MEDICINE | Facility: CLINIC | Age: 63
End: 2023-03-02
Payer: MEDICARE

## 2023-03-03 ENCOUNTER — PATIENT MESSAGE (OUTPATIENT)
Dept: INTERNAL MEDICINE | Facility: CLINIC | Age: 63
End: 2023-03-03
Payer: MEDICARE

## 2023-03-03 RX ORDER — LEVOFLOXACIN 750 MG/1
750 TABLET ORAL DAILY
Qty: 5 TABLET | Refills: 0 | Status: SHIPPED | OUTPATIENT
Start: 2023-03-03 | End: 2023-03-08

## 2023-03-06 ENCOUNTER — PATIENT OUTREACH (OUTPATIENT)
Dept: ADMINISTRATIVE | Facility: HOSPITAL | Age: 63
End: 2023-03-06
Payer: MEDICARE

## 2023-03-09 ENCOUNTER — TELEPHONE (OUTPATIENT)
Dept: NEUROLOGY | Facility: CLINIC | Age: 63
End: 2023-03-09
Payer: MEDICARE

## 2023-03-09 NOTE — TELEPHONE ENCOUNTER
----- Message from Pranay Enciso sent at 3/9/2023 10:48 AM CST -----  Contact: [employer]  .Type:  RX Refill Request    Who Called: urszula  Refill or New Rx:refill  RX Name and Strength:ondansetron (ZOFRAN-ODT) 4 MG TbDL  How is the patient currently taking it? (ex. 1XDay):as prescribed   Is this a 30 day or 90 day RX:90 day   Preferred Pharmacy with phone number:  [facility] - [place], [place] - [address]  [address] [address]  Phone: [phone] Fax: [phone]  Local or Mail Order:local  Ordering Provider:dr tomlinson  Would the patient rather a call back or a response via [facility]? no  Best Call Back Number:n/a  Additional Information: n/a          Thanks  DD

## 2023-03-23 ENCOUNTER — PATIENT MESSAGE (OUTPATIENT)
Dept: NEUROLOGY | Facility: CLINIC | Age: 63
End: 2023-03-23
Payer: MEDICARE

## 2023-03-23 RX ORDER — ONDANSETRON 4 MG/1
8 TABLET, ORALLY DISINTEGRATING ORAL EVERY 12 HOURS PRN
Qty: 9 TABLET | Refills: 3 | Status: SHIPPED | OUTPATIENT
Start: 2023-03-23

## 2023-03-28 ENCOUNTER — PATIENT MESSAGE (OUTPATIENT)
Dept: OPHTHALMOLOGY | Facility: CLINIC | Age: 63
End: 2023-03-28
Payer: MEDICARE

## 2023-03-29 ENCOUNTER — PATIENT MESSAGE (OUTPATIENT)
Dept: SLEEP MEDICINE | Facility: CLINIC | Age: 63
End: 2023-03-29
Payer: MEDICARE

## 2023-04-10 ENCOUNTER — TELEPHONE (OUTPATIENT)
Dept: NEUROLOGY | Facility: CLINIC | Age: 63
End: 2023-04-10
Payer: MEDICARE

## 2023-04-10 NOTE — TELEPHONE ENCOUNTER
----- Message from Saida Kim sent at 4/10/2023 11:31 AM CDT -----  Contact: Serene Cota is calling to cancel/reschedule today's procedure/ Botox appt. Please call patient back at .[phone]

## 2023-04-10 NOTE — TELEPHONE ENCOUNTER
Spoke with pt she wanted to advise the office that she needs to cx appt she got engaged and moved to [place] . Asked if I could also sent the message to pulmonary for her as well . Message sent botox cx

## 2023-06-14 ENCOUNTER — PATIENT MESSAGE (OUTPATIENT)
Dept: PULMONOLOGY | Facility: CLINIC | Age: 63
End: 2023-06-14
Payer: MEDICARE

## 2023-06-16 ENCOUNTER — TELEPHONE (OUTPATIENT)
Dept: PULMONOLOGY | Facility: CLINIC | Age: 63
End: 2023-06-16
Payer: MEDICARE

## 2023-06-16 NOTE — TELEPHONE ENCOUNTER
----- Message from Orquidea Orlea sent at 6/16/2023  1:23 PM CDT -----  Contact: Keeley [phone]  Keeley from Dr. Kandis Craig DDS office calling in regards to a medical clearance that was faxed over yesterday.  Pt has an appt with them on Tuesday so they need the form sent back today if possible.  Please call with any questions.

## 2023-07-05 ENCOUNTER — PATIENT MESSAGE (OUTPATIENT)
Dept: NEUROSURGERY | Facility: CLINIC | Age: 63
End: 2023-07-05
Payer: MEDICARE

## 2023-07-05 ENCOUNTER — PATIENT MESSAGE (OUTPATIENT)
Dept: INTERNAL MEDICINE | Facility: CLINIC | Age: 63
End: 2023-07-05
Payer: MEDICARE

## 2023-07-05 DIAGNOSIS — J67.9 HYPERSENSITIVITY PNEUMONITIS: Chronic | ICD-10-CM

## 2023-07-05 DIAGNOSIS — G43.009 MIGRAINE WITHOUT AURA AND WITHOUT STATUS MIGRAINOSUS, NOT INTRACTABLE: Chronic | ICD-10-CM

## 2023-07-05 DIAGNOSIS — R05.9 COUGH: ICD-10-CM

## 2023-07-05 DIAGNOSIS — J67.9 HYPERSENSITIVITY PNEUMONITIS: ICD-10-CM

## 2023-07-05 DIAGNOSIS — I10 ESSENTIAL HYPERTENSION: Chronic | ICD-10-CM

## 2023-07-05 DIAGNOSIS — F33.41 RECURRENT MAJOR DEPRESSIVE DISORDER IN PARTIAL REMISSION: ICD-10-CM

## 2023-07-05 RX ORDER — MYCOPHENOLATE MOFETIL 250 MG/1
CAPSULE ORAL
Qty: 360 CAPSULE | Refills: 0 | Status: SHIPPED | OUTPATIENT
Start: 2023-07-05

## 2023-07-05 RX ORDER — BENZONATATE 100 MG/1
CAPSULE ORAL
Qty: 120 CAPSULE | Refills: 3 | Status: SHIPPED | OUTPATIENT
Start: 2023-07-05

## 2023-07-05 NOTE — TELEPHONE ENCOUNTER
----- Message from Airam Christensen sent at 7/5/2023 11:12 AM CDT -----  Contact: Serene  Patient is calling to speak with the nurse regarding medication refills. Please give a call back at .[phone] as requested.  Thanks  LR

## 2023-07-06 RX ORDER — BUPROPION HYDROCHLORIDE 300 MG/1
300 TABLET ORAL DAILY
Qty: 90 TABLET | Refills: 0 | Status: SHIPPED | OUTPATIENT
Start: 2023-07-06 | End: 2024-07-05

## 2023-07-06 RX ORDER — VERAPAMIL HYDROCHLORIDE 240 MG/1
240 TABLET, FILM COATED, EXTENDED RELEASE ORAL DAILY
Qty: 90 TABLET | Refills: 0 | Status: SHIPPED | OUTPATIENT
Start: 2023-07-06

## 2023-07-06 RX ORDER — ESCITALOPRAM OXALATE 20 MG/1
20 TABLET ORAL DAILY
Qty: 90 TABLET | Refills: 0 | Status: SHIPPED | OUTPATIENT
Start: 2023-07-06 | End: 2024-07-05

## 2023-07-06 NOTE — TELEPHONE ENCOUNTER
Care Due:                  Date            Visit Type   Department     Provider  --------------------------------------------------------------------------------                                MYCHART                              FOLLOWUP/OF  [facility] INTER[facility]  Last Visit: 12-      [facility]ICE VISIT   MEDICINE       Chapin Miranda  Next Visit: None Scheduled  None         None Found                                                            Last  Test          Frequency    Reason                     Performed    Due Date  --------------------------------------------------------------------------------    HBA1C.......  6 months...  dulaglutide..............  Not Found    Overdue    [employer] Embedded Care Due Messages. Reference number: [id number].   7/06/2023 8:13:14 AM CDT

## 2023-07-07 ENCOUNTER — PATIENT MESSAGE (OUTPATIENT)
Dept: INFECTIOUS DISEASES | Facility: CLINIC | Age: 63
End: 2023-07-07
Payer: MEDICARE

## 2023-08-07 DIAGNOSIS — G43.011 INTRACTABLE MIGRAINE WITHOUT AURA AND WITH STATUS MIGRAINOSUS: ICD-10-CM

## 2023-08-08 RX ORDER — TOPIRAMATE 100 MG/1
100 TABLET, FILM COATED ORAL
Qty: 90 TABLET | Refills: 1 | Status: SHIPPED | OUTPATIENT
Start: 2023-08-08

## 2024-02-28 DIAGNOSIS — Z12.31 OTHER SCREENING MAMMOGRAM: ICD-10-CM

## 2024-04-03 DIAGNOSIS — I10 ESSENTIAL HYPERTENSION: ICD-10-CM

## (undated) DEVICE — SEE MEDLINE ITEM 157148

## (undated) DEVICE — NDL SAFETY 22G X 1.5 ECLIPSE

## (undated) DEVICE — NDL SPINAL SPINOCAN 22GX3.5

## (undated) DEVICE — UNDERGLOVE BIOGEL PI SZ 6.5 LF

## (undated) DEVICE — GAUZE SPONGE PEANUT STRL

## (undated) DEVICE — LOTION DURA PREP REMOVER 40Z

## (undated) DEVICE — SKIN MARKER DEVON 160

## (undated) DEVICE — SEE MEDLINE ITEM 157027

## (undated) DEVICE — ADHESIVE DERMABOND ADVANCED

## (undated) DEVICE — ELECTRODE REM PLYHSV RETURN 9

## (undated) DEVICE — NDL ECLIPSE SAFETY 18GX1-1/2IN

## (undated) DEVICE — CORD BIPOLAR ELECTROSURGICAL

## (undated) DEVICE — SEE MEDLINE ITEM 157131

## (undated) DEVICE — ADHESIVE MASTISOL VIAL 48/BX

## (undated) DEVICE — HEMOSTAT SURGICEL FIBRLR 1X2IN

## (undated) DEVICE — TOWEL OR DISP STRL BLUE 4/PK

## (undated) DEVICE — GLOVE SURGICAL LATEX SZ 7

## (undated) DEVICE — GLOVE BIOGEL ECLIPSE SZ 8

## (undated) DEVICE — INSERT CUSHIONPRONE VIEW LARGE

## (undated) DEVICE — DRESSING TRANS 4X4 TEGADERM

## (undated) DEVICE — BUR LEGEND MIDAS REX 14CM 13MM

## (undated) DEVICE — DRAPE INCISE IOBAN 2 23X17IN

## (undated) DEVICE — SYR ONLY LUER LOCK 20CC

## (undated) DEVICE — STAPLER SKIN PROXIMATE WIDE

## (undated) DEVICE — SUT VICRYL+ 2-0 SH 18IN

## (undated) DEVICE — SUT ETHILON 3/0 18IN PS-1

## (undated) DEVICE — DRAPE STERI INSTRUMENT 1018

## (undated) DEVICE — UNDERGLOVES BIOGEL PI SIZE 8

## (undated) DEVICE — MATRIX FLOSEAL HEMOSTATIC 10ML

## (undated) DEVICE — SYR 10CC LUER LOCK

## (undated) DEVICE — Device

## (undated) DEVICE — DURAPREP SURG SCRUB 26ML

## (undated) DEVICE — SPONGE CODMAN SURG NEUR .5X1.5

## (undated) DEVICE — DRESSING SURGICAL 1/2X1/2

## (undated) DEVICE — DRAPE SURG W/TWL 17 5/8X23

## (undated) DEVICE — DRAPE CORETEMP FLD WRM 56X62IN

## (undated) DEVICE — BLADE EZ CLEAN 2.5IN MODIFIED

## (undated) DEVICE — SUT VICRYL PLUS 2-0 CT1 18

## (undated) DEVICE — SUT VICRYL PLUS 3-0 SH 18IN

## (undated) DEVICE — DRAPE MOBILE C-ARM

## (undated) DEVICE — SEE MEDLINE ITEM 157117

## (undated) DEVICE — PACK DRAPE UNIVERSAL CONVERTOR

## (undated) DEVICE — BLADE SURG CARBON STEEL #10

## (undated) DEVICE — SYS CLSR DERMABOND PRINEO 22CM

## (undated) DEVICE — ALCOHOL 70% ISOP RUBBING 4OZ

## (undated) DEVICE — CONTAINER SPECIMEN OR STER 4OZ

## (undated) DEVICE — COVER LIGHT HANDLE 80/CA